# Patient Record
Sex: MALE | Race: WHITE | NOT HISPANIC OR LATINO
[De-identification: names, ages, dates, MRNs, and addresses within clinical notes are randomized per-mention and may not be internally consistent; named-entity substitution may affect disease eponyms.]

---

## 2017-01-23 ENCOUNTER — TRANSCRIPTION ENCOUNTER (OUTPATIENT)
Age: 47
End: 2017-01-23

## 2017-04-13 ENCOUNTER — APPOINTMENT (OUTPATIENT)
Dept: UROLOGY | Facility: CLINIC | Age: 47
End: 2017-04-13

## 2017-04-13 DIAGNOSIS — R31.29 OTHER MICROSCOPIC HEMATURIA: ICD-10-CM

## 2017-04-13 DIAGNOSIS — R30.0 DYSURIA: ICD-10-CM

## 2017-04-13 DIAGNOSIS — N20.1 CALCULUS OF URETER: ICD-10-CM

## 2017-04-14 LAB
APPEARANCE: CLEAR
BACTERIA: NEGATIVE
BILIRUBIN URINE: NEGATIVE
BLOOD URINE: NEGATIVE
COLOR: YELLOW
GLUCOSE QUALITATIVE U: NORMAL MG/DL
KETONES URINE: NEGATIVE
LEUKOCYTE ESTERASE URINE: NEGATIVE
MICROSCOPIC-UA: NORMAL
NITRITE URINE: NEGATIVE
PH URINE: 7.5
PROTEIN URINE: NEGATIVE MG/DL
RED BLOOD CELLS URINE: 2 /HPF
SPECIFIC GRAVITY URINE: 1.01
SQUAMOUS EPITHELIAL CELLS: 0 /HPF
UROBILINOGEN URINE: NORMAL MG/DL
WHITE BLOOD CELLS URINE: 2 /HPF

## 2017-09-28 ENCOUNTER — APPOINTMENT (OUTPATIENT)
Dept: UROLOGY | Facility: CLINIC | Age: 47
End: 2017-09-28

## 2017-11-13 ENCOUNTER — TRANSCRIPTION ENCOUNTER (OUTPATIENT)
Age: 47
End: 2017-11-13

## 2017-12-18 ENCOUNTER — TRANSCRIPTION ENCOUNTER (OUTPATIENT)
Age: 47
End: 2017-12-18

## 2018-01-23 ENCOUNTER — INPATIENT (INPATIENT)
Facility: HOSPITAL | Age: 48
LOS: 1 days | Discharge: HOME | DRG: 65 | End: 2018-01-25
Attending: PSYCHIATRY & NEUROLOGY | Admitting: PSYCHIATRY & NEUROLOGY
Payer: COMMERCIAL

## 2018-01-23 ENCOUNTER — EMERGENCY (EMERGENCY)
Facility: HOSPITAL | Age: 48
LOS: 0 days | Discharge: ANOTHER HOSPITAL | End: 2018-01-23
Attending: EMERGENCY MEDICINE | Admitting: EMERGENCY MEDICINE
Payer: COMMERCIAL

## 2018-01-23 VITALS
SYSTOLIC BLOOD PRESSURE: 139 MMHG | DIASTOLIC BLOOD PRESSURE: 85 MMHG | OXYGEN SATURATION: 99 % | TEMPERATURE: 98 F | RESPIRATION RATE: 18 BRPM | HEART RATE: 83 BPM

## 2018-01-23 VITALS
SYSTOLIC BLOOD PRESSURE: 119 MMHG | HEART RATE: 72 BPM | RESPIRATION RATE: 17 BRPM | DIASTOLIC BLOOD PRESSURE: 90 MMHG | OXYGEN SATURATION: 100 %

## 2018-01-23 VITALS
RESPIRATION RATE: 18 BRPM | OXYGEN SATURATION: 100 % | HEIGHT: 68 IN | DIASTOLIC BLOOD PRESSURE: 115 MMHG | HEART RATE: 80 BPM | SYSTOLIC BLOOD PRESSURE: 150 MMHG | TEMPERATURE: 100 F | WEIGHT: 210.1 LBS

## 2018-01-23 DIAGNOSIS — Z90.49 ACQUIRED ABSENCE OF OTHER SPECIFIED PARTS OF DIGESTIVE TRACT: Chronic | ICD-10-CM

## 2018-01-23 DIAGNOSIS — I63.9 CEREBRAL INFARCTION, UNSPECIFIED: ICD-10-CM

## 2018-01-23 DIAGNOSIS — Z90.89 ACQUIRED ABSENCE OF OTHER ORGANS: Chronic | ICD-10-CM

## 2018-01-23 DIAGNOSIS — R47.81 SLURRED SPEECH: ICD-10-CM

## 2018-01-23 DIAGNOSIS — F17.210 NICOTINE DEPENDENCE, CIGARETTES, UNCOMPLICATED: ICD-10-CM

## 2018-01-23 DIAGNOSIS — R29.702 NIHSS SCORE 2: ICD-10-CM

## 2018-01-23 LAB
ALBUMIN SERPL ELPH-MCNC: 4.1 G/DL — SIGNIFICANT CHANGE UP (ref 3.3–5)
ALP SERPL-CCNC: 83 U/L — SIGNIFICANT CHANGE UP (ref 40–120)
ALT FLD-CCNC: 39 U/L — SIGNIFICANT CHANGE UP (ref 12–78)
ANION GAP SERPL CALC-SCNC: 8 MMOL/L — SIGNIFICANT CHANGE UP (ref 5–17)
APPEARANCE UR: CLEAR — SIGNIFICANT CHANGE UP
APTT BLD: 33.3 SEC — SIGNIFICANT CHANGE UP (ref 27.5–37.4)
AST SERPL-CCNC: 34 U/L — SIGNIFICANT CHANGE UP (ref 15–37)
BASOPHILS # BLD AUTO: 0.1 K/UL — SIGNIFICANT CHANGE UP (ref 0–0.2)
BASOPHILS NFR BLD AUTO: 1.2 % — SIGNIFICANT CHANGE UP (ref 0–2)
BILIRUB SERPL-MCNC: 0.6 MG/DL — SIGNIFICANT CHANGE UP (ref 0.2–1.2)
BILIRUB UR-MCNC: NEGATIVE — SIGNIFICANT CHANGE UP
BLD GP AB SCN SERPL QL: NEGATIVE — SIGNIFICANT CHANGE UP
BUN SERPL-MCNC: 15 MG/DL — SIGNIFICANT CHANGE UP (ref 7–23)
CALCIUM SERPL-MCNC: 8.9 MG/DL — SIGNIFICANT CHANGE UP (ref 8.5–10.1)
CHLORIDE SERPL-SCNC: 107 MMOL/L — SIGNIFICANT CHANGE UP (ref 96–108)
CO2 SERPL-SCNC: 24 MMOL/L — SIGNIFICANT CHANGE UP (ref 22–31)
COLOR SPEC: YELLOW — SIGNIFICANT CHANGE UP
CREAT SERPL-MCNC: 0.82 MG/DL — SIGNIFICANT CHANGE UP (ref 0.5–1.3)
DIFF PNL FLD: NEGATIVE — SIGNIFICANT CHANGE UP
EOSINOPHIL # BLD AUTO: 0.2 K/UL — SIGNIFICANT CHANGE UP (ref 0–0.5)
EOSINOPHIL NFR BLD AUTO: 2.1 % — SIGNIFICANT CHANGE UP (ref 0–6)
GLUCOSE SERPL-MCNC: 81 MG/DL — SIGNIFICANT CHANGE UP (ref 70–99)
GLUCOSE UR QL: NEGATIVE MG/DL — SIGNIFICANT CHANGE UP
HCT VFR BLD CALC: 49.4 % — SIGNIFICANT CHANGE UP (ref 39–50)
HGB BLD-MCNC: 16.6 G/DL — SIGNIFICANT CHANGE UP (ref 13–17)
INR BLD: 1.11 RATIO — SIGNIFICANT CHANGE UP (ref 0.88–1.16)
KETONES UR-MCNC: NEGATIVE — SIGNIFICANT CHANGE UP
LEUKOCYTE ESTERASE UR-ACNC: NEGATIVE — SIGNIFICANT CHANGE UP
LYMPHOCYTES # BLD AUTO: 2.1 K/UL — SIGNIFICANT CHANGE UP (ref 1–3.3)
LYMPHOCYTES # BLD AUTO: 20.8 % — SIGNIFICANT CHANGE UP (ref 13–44)
MCHC RBC-ENTMCNC: 29.2 PG — SIGNIFICANT CHANGE UP (ref 27–34)
MCHC RBC-ENTMCNC: 33.7 GM/DL — SIGNIFICANT CHANGE UP (ref 32–36)
MCV RBC AUTO: 86.8 FL — SIGNIFICANT CHANGE UP (ref 80–100)
MONOCYTES # BLD AUTO: 0.9 K/UL — SIGNIFICANT CHANGE UP (ref 0–0.9)
MONOCYTES NFR BLD AUTO: 8.3 % — SIGNIFICANT CHANGE UP (ref 2–14)
NEUTROPHILS # BLD AUTO: 6.9 K/UL — SIGNIFICANT CHANGE UP (ref 1.8–7.4)
NEUTROPHILS NFR BLD AUTO: 67.6 % — SIGNIFICANT CHANGE UP (ref 43–77)
NITRITE UR-MCNC: NEGATIVE — SIGNIFICANT CHANGE UP
PH UR: 7 — SIGNIFICANT CHANGE UP (ref 5–8)
PLATELET # BLD AUTO: 246 K/UL — SIGNIFICANT CHANGE UP (ref 150–400)
POTASSIUM SERPL-MCNC: 3.7 MMOL/L — SIGNIFICANT CHANGE UP (ref 3.5–5.3)
POTASSIUM SERPL-SCNC: 3.7 MMOL/L — SIGNIFICANT CHANGE UP (ref 3.5–5.3)
PROT SERPL-MCNC: 7.6 GM/DL — SIGNIFICANT CHANGE UP (ref 6–8.3)
PROT UR-MCNC: NEGATIVE MG/DL — SIGNIFICANT CHANGE UP
PROTHROM AB SERPL-ACNC: 12 SEC — SIGNIFICANT CHANGE UP (ref 9.8–12.7)
RBC # BLD: 5.68 M/UL — SIGNIFICANT CHANGE UP (ref 4.2–5.8)
RBC # FLD: 11.9 % — SIGNIFICANT CHANGE UP (ref 10.3–14.5)
RH IG SCN BLD-IMP: POSITIVE — SIGNIFICANT CHANGE UP
SODIUM SERPL-SCNC: 139 MMOL/L — SIGNIFICANT CHANGE UP (ref 135–145)
SP GR SPEC: 1 — LOW (ref 1.01–1.02)
TROPONIN I SERPL-MCNC: <0.015 NG/ML — SIGNIFICANT CHANGE UP (ref 0.01–0.04)
UROBILINOGEN FLD QL: NEGATIVE MG/DL — SIGNIFICANT CHANGE UP
WBC # BLD: 10.3 K/UL — SIGNIFICANT CHANGE UP (ref 3.8–10.5)
WBC # FLD AUTO: 10.3 K/UL — SIGNIFICANT CHANGE UP (ref 3.8–10.5)

## 2018-01-23 PROCEDURE — 93010 ELECTROCARDIOGRAM REPORT: CPT

## 2018-01-23 PROCEDURE — 70496 CT ANGIOGRAPHY HEAD: CPT | Mod: 26

## 2018-01-23 PROCEDURE — 99285 EMERGENCY DEPT VISIT HI MDM: CPT

## 2018-01-23 PROCEDURE — 99291 CRITICAL CARE FIRST HOUR: CPT | Mod: 25

## 2018-01-23 PROCEDURE — 99291 CRITICAL CARE FIRST HOUR: CPT

## 2018-01-23 PROCEDURE — 70498 CT ANGIOGRAPHY NECK: CPT | Mod: 26

## 2018-01-23 PROCEDURE — 70450 CT HEAD/BRAIN W/O DYE: CPT | Mod: 26

## 2018-01-23 RX ORDER — ATORVASTATIN CALCIUM 80 MG/1
80 TABLET, FILM COATED ORAL AT BEDTIME
Qty: 0 | Refills: 0 | Status: DISCONTINUED | OUTPATIENT
Start: 2018-01-23 | End: 2018-01-25

## 2018-01-23 RX ORDER — CHOLECALCIFEROL (VITAMIN D3) 125 MCG
1 CAPSULE ORAL
Qty: 0 | Refills: 0 | COMMUNITY

## 2018-01-23 RX ORDER — ALTEPLASE 100 MG
81 KIT INTRAVENOUS ONCE
Qty: 0 | Refills: 0 | Status: COMPLETED | OUTPATIENT
Start: 2018-01-23 | End: 2018-01-23

## 2018-01-23 RX ORDER — ALTEPLASE 100 MG
9 KIT INTRAVENOUS ONCE
Qty: 0 | Refills: 0 | Status: COMPLETED | OUTPATIENT
Start: 2018-01-23 | End: 2018-01-23

## 2018-01-23 RX ORDER — CHOLECALCIFEROL (VITAMIN D3) 125 MCG
400 CAPSULE ORAL DAILY
Qty: 0 | Refills: 0 | Status: DISCONTINUED | OUTPATIENT
Start: 2018-01-23 | End: 2018-01-25

## 2018-01-23 RX ORDER — SODIUM CHLORIDE 9 MG/ML
1000 INJECTION INTRAMUSCULAR; INTRAVENOUS; SUBCUTANEOUS ONCE
Qty: 0 | Refills: 0 | Status: COMPLETED | OUTPATIENT
Start: 2018-01-23 | End: 2018-01-23

## 2018-01-23 RX ORDER — ASPIRIN/CALCIUM CARB/MAGNESIUM 324 MG
324 TABLET ORAL ONCE
Qty: 0 | Refills: 0 | Status: COMPLETED | OUTPATIENT
Start: 2018-01-23 | End: 2018-01-23

## 2018-01-23 RX ADMIN — ALTEPLASE 540 MILLIGRAM(S): KIT at 12:36

## 2018-01-23 RX ADMIN — ALTEPLASE 81 MILLIGRAM(S): KIT at 12:37

## 2018-01-23 RX ADMIN — Medication 324 MILLIGRAM(S): at 11:49

## 2018-01-23 RX ADMIN — SODIUM CHLORIDE 1000 MILLILITER(S): 9 INJECTION INTRAMUSCULAR; INTRAVENOUS; SUBCUTANEOUS at 10:50

## 2018-01-23 NOTE — ED PROVIDER NOTE - OBJECTIVE STATEMENT
47y male transfer from [facility] concern for stroke. Pt. reports waking up at 4am with difficulty using his left hand, went to sleep, woke up normal, then at work started to have slurred speech, left upper and lower extremity weakness and abnormal gait. Pt. presented to [facility] where symptoms had resolved and reportedly recurred while in CT scanner. tPA was given and patient was transferred. On arrival patient denies any symptoms, no cp, sob, n/v/d, weakness, changes in vision/hearing.

## 2018-01-23 NOTE — H&P ADULT - HISTORY OF PRESENT ILLNESS
Patient is a 47 year old left handed  male transferred from [facility]. Patient has PMH of HTN. He states that he woke up at 4am to blow his nose however was having difficulty reaching his face with his left hand. He went back to sleep and when he woke up this morning his symptoms have resolved. His last known normal was around 9:50am, and at 10am he noticed that he was having difficulty writing, had some slurred speech with drooling, and some gait difficulty. He was transferred over to [facility] and en route via ambulance, symptoms resolved. While in ED he had again some slurred speech and he was given tPA at 12:40pm. Patient transferred to [facility].

## 2018-01-23 NOTE — ED ADULT NURSE NOTE - OBJECTIVE STATEMENT
48 yo male sent to ED from  for CODE STROKE. A&Ox3. Pt reports that he woke up around 0400 AM with difficulty of fine movements of his left hand, and went back to sleep. Pt woke up normal and went to work. Around 10:00 AM, pt had left upper extremity weakness, slurred speech and unsteady gait. These symptoms then resolved, pt went to [place] ED, where his symptoms returned in CT scanner. CT scan show no evidence of bleeding and pt was given TPA 9 mg IV push and 81 mg/hour IV drip (according to TPA dosing protocol & pt weight of 99.2 kg). Pt had no neurological deficit en route to hospital as per EMS. Pt GCS of 15 here currently. Pt A&Ox4. No facial symmetry noted. No slurred speech. Equal strength to the bilateral upper and lower extremities. Neurologically intact. Pt has two IVs placed prior to arrival. Pt placed on cardiac monitor. Pt denies chest pain, SOB, N/V, abdominal pain, headache, fevers, and chills. Will continue to reassess.

## 2018-01-23 NOTE — ED PROVIDER NOTE - CRITICAL CARE PROVIDED
documentation/direct patient care (not related to procedure)/interpretation of diagnostic studies/additional history taking/consultation with other physicians

## 2018-01-23 NOTE — ED PROVIDER NOTE - NEUROLOGICAL, MLM
Alert and oriented, no focal deficits, no motor or sensory deficits. No pronator drift, nl finger-to-nose, nl strength/sensation bilaterally. Speech normal, CNII-XII intact

## 2018-01-23 NOTE — CONSULT NOTE ADULT - ASSESSMENT
47 year old man with stuttering left sided weakness, dysarthria, c/w right sided ischemia,? large vessel disease.  Spoke with Dr. Hoover, form [facility] stroke team, who recommended TPA and transfer to [place] for evaluation of possible intervention.

## 2018-01-23 NOTE — H&P ADULT - ATTENDING COMMENTS
I have seen and examined this patient with the stroke neurology team.     History was reviewed with the patient and/or available family members.   ROS: All negative except documented in the HPI.   Neurological exam was performed and agree with exam as documented above.   Laboratory results and imaging studies were reviewed by me.   I agree with the neurology resident note as documented above.    47 years old left-handed man with vascular risk factor of active smoking and hypertension is evaluated at [facility] for symptoms concerning for stroke. On 1/22 at around 4 AM, he awoke with left facial sensory paresthesia in the form of tingling/numbness and left upper extremity clumsiness. He did not seek any immediate medical attention but went back to sleep and upon waking up at 6 AM he reports complete resolution of his neurological symptoms. At around 10 AM at his work, he noticed acute onset of left arm weakness/clumsiness, dysarthria and left facial droop. He presented to OSH for further evaluation but reported to have noticed complete resolution of his focal neurological symptoms in the OSH ED by around 11:15 11:20 AM. Soon after that he was noted to have reappearance of his neurological symptoms and was subsequently treated with IV tPA. He was transferred to [facility] for further evaluation. Neurological examination today is nonfocal. CT brain on admission did not show any evidence of acute infarct or hemorrhage. CTA head and neck did not show any significant intracranial or extracranial large vessel severe stenosis or occlusion.    Impression:  Cerebral thrombosis with cerebral infarction. Right hemispheric dysfunction likely secondary to right MCA distribution stroke involving corona radiata versus right brainstem infarct - likely etiology being small vessel disease but possibility of embolism from a proximal source like cardiac/paradoxical source of embolism needs to be ruled out    Plan:   - Continue with  24 hours post IV tPA precautions including BP goal<180/105 mmHg for first 24 hours followed by gradual normotension as per protocol  - Not a candidate for neurosurgical intervention due to low NIHSS and CTA findings   - MRI brain without contrast   - Aspirin and pharmacological DVT prophylaxis to be considered at 24 hours after the IV tPA and after repeating brain imaging, SCDs for DVT prophylaxis in the interim  - Atorvastatin 80 mg at bedtime after establishing enteral access or passing swallow evaluation  - TTE with bubble study and telemetry to screen for possible cardiac source of embolism  - Prolonged cardiac monitoring with ICM to screen for occult cardiac arrythmia being the cardiac source of embolism, to be considered based on results of above mentioned tests   - HbA1C and LDL, continue with aggressive vascular risk factors modifications   - PT/OT/Speech and swallow evaluation     Above mentioned plan was discussed with patient in detail. All the questions were answered and concerns were addressed. More than 86 minutes were spent in evaluation and management of this critically ill and unstable patient with acute stroke.

## 2018-01-23 NOTE — CONSULT NOTE ADULT - PROBLEM SELECTOR RECOMMENDATION 9
TPA protocol.  transfer to stroke team [facility].  Plan discussed with wife and patient at the bedside who agree with above.

## 2018-01-23 NOTE — ED ADULT TRIAGE NOTE - CHIEF COMPLAINT QUOTE
Pt BIBA for stroke like symtpoms that started at approximately 1020 this morning. Pt had left sided facial droop, slurred speech and left sided numbness to the hand. BGM 04 in department. Pt had 18 G IV placed in right AC pta. Pt had similar symptoms at 4 am. Dr. [facility] evaluated pt in triage and stated it was not a CODE Stroke at this time.

## 2018-01-23 NOTE — ED ADULT NURSE NOTE - CHPI ED SYMPTOMS NEG
no blurred vision/no vomiting/no change in level of consciousness/no weakness/no confusion/no numbness/no loss of consciousness/no nausea/no dizziness/no fever

## 2018-01-23 NOTE — ED ADULT NURSE NOTE - OBJECTIVE STATEMENT
Pt states that at 0400 today he felt symptoms of left sided weakness, numbness/tingling in face and left arm - symptoms resolved and pt went back to sleep.  Pt felt normal upon going to work today but began to experience symptoms at approx 10am of left sided weakness to arm and leg and slurred speech, ambulance was called and pt brought to ER - while in ambulance symptoms resolved.  Pt with no symptoms upon arrival to ER.  During neuro assessment by resident MD pt began to experience symptoms again of slurred speech, left arm weakness and left sided facial droop - code stroke called at this time and pt brought immediately to CT scan.  During neuro exam by neuro PA symptoms again began to resolve.

## 2018-01-23 NOTE — ED PROVIDER NOTE - ATTENDING CONTRIBUTION TO CARE
Pt tx from [facility] for acute stroke s/p tpa with resolved L side weakness.  No complaints currently.  Appears well.

## 2018-01-23 NOTE — ED PROVIDER NOTE - OBJECTIVE STATEMENT
48 y/o M hx HTN, smoker, FHx CAD p/w numbness to L face, weakness to L hand and slurred speech. Pt reports he woke from sleep approx 6-8 hrs pta and felt hand numbness and had difficulty blowing his nose. His sx resolved and he attributed it to "carpal tunnel" issues he has had before from sleeping on arm. His sx returned about 1 hour ago while at work accompanied by facial numbness and slurred speech so EMS was called. He also reports he had unsteady gait. En route via EMS pt reports his sx completely resolved. No pain, no recent fever.   Initial Eval NIHSS = 0  Shortly after initial eval pts sx returned and had mild facial droop/dysarthria, repeat NIHSS = 2.   Code stroke called at 11:25  Case d/w neurology attg Dr Muro [id number]

## 2018-01-23 NOTE — H&P ADULT - ASSESSMENT
Patient is a 47 year old left handed  male transferred from [facility]. Patient has PMH of HTN. He states that he woke up at 4am to blow his nose however was having difficulty reaching his face with his left hand. He went back to sleep and when he woke up this morning his symptoms have resolved. His last known normal was around 9:50am, and at 10am he noticed that he was having difficulty writing, had some slurred speech with drooling, and some gait difficulty. He was transferred over to [facility] and en route via ambulance, symptoms resolved. While in ED he had again some slurred speech and he was given tPA at 12:40pm. Patient transferred to [facility].   Neuro exam nonfocal  CT head negative for acute pathology  NIHSS 0 preMRS 0  tPA given 12:40pm    Likely right hemispheric infarct    Recommend:  F/u official read on CTA head and neck, on my prelim review, no acute stenosis  Permissive HTN for 24 hours up to 185/105  Repeat CT head in 24 hours  If repeat CT head without hemorrhagic conversion, start on heparin/lovenox for DVT prophylaxis and ASA 81 mg QD  MRI brain without contrast  TTE with bubble study for possible valvular heart disease  Lipitor 80 mg PO QHS  HbA1c, LDL and continue with aggressive vascular risk factors modifications   Tele monitor  PT/OT/S&S eval  Neurochecks q2h

## 2018-01-23 NOTE — CONSULT NOTE ADULT - SUBJECTIVE AND OBJECTIVE BOX
Neurology Consult requested by:   Patient is a 47y old  Male who presents with a chief complaint of recurrent left facial droop, slurred speech, left arm weakness.   HPI: 47 year old man, smoker. Brought by ambulance for c/o slurred speech, left arm weakness, facial droop. Started around 4 Am, patient woke up, went back to bed, woke at 6: 30 felt fine. later this AM, around 10:30 AM agin noted symptoms at work, called EMS. On arrival to ER he was normal, again symptoms recurred, now resolved again. No recent illness, no injuries. Denies similar events int he past.      PAST MEDICAL & SURGICAL HISTORY:  Calculus of ureter  S/P tonsillectomy  Status post appendectomy  Status post cholecystectomy    FAMILY HISTORY:  No pertinent family history in first degree relatives    Social Hx:  Nonsmoker, no drug or alcohol use  Medications and Allergies Reviewed MEDICATIONS  (STANDING):     ROS: Pertinent positives in HPI, all other ROS were reviewed and are negative.      Examination:   Vital Signs Last 24 Hrs  T(C): 37.6 (23 Jan 2018 11:11), Max: 37.6 (23 Jan 2018 11:11)  T(F): 99.6 (23 Jan 2018 11:11), Max: 99.6 (23 Jan 2018 11:11)  HR: 80 (23 Jan 2018 11:11) (80 - 80)  BP: 150/115 (23 Jan 2018 11:11) (150/115 - 150/115)  BP(mean): --  RR: 18 (23 Jan 2018 11:11) (18 - 18)  SpO2: 100% (23 Jan 2018 11:11) (100% - 100%)  General: Cooperative, NAD   NECK: supple, no masses  ENT: Normal hearing   Vascular : no carotid bruits,   Lungs: CTAB  Chest: RRR, no murmurs  Extremities: nontender, no edema  Musculoskeletal: no adventitious movements, no joint stiffness  Skin: no rash    Neurological Examination:  NIHSS:3  MS: AOx3. Appropriately interactive, normal affect. Speech mildly dysarthric,  w/o paraphasic error, repetition, naming, reading is intact   CN: VFFTC, PERLL, EOMI, V1-3 sensation intact, face asymmetric, mild left facial NLF asymmetry  hearing intact, palate elevates symmetrically, tongue midline, SCM equal bilaterally  Motor: normal bulk and tone, no tremor, rigidity or bradykinesia.  5/5 all over. + left pronator drift  Sens: Intact to light touch.    Reflexes: 2/4 all over, downgoing toes b/l  Coord:  No dysmetria, DANI intact   Gait: Cannot test    Labs: Reviewed  Imaging:   < from: CT Brain Stroke Protocol (01.23.18 @ 11:31) >  FINDINGS:   No previous examinations are available for review.    The brain demonstrates no abnormal attenuation.   No acute cerebral   cortical infarct is seen. No intracranial hemorrhage is found.  No mass   effect is found in the brain.      The ventricles, sulci and basal cisterns appear unremarkable.         The orbits are unremarkable.  The paranasal sinuses are clear.  The nasal   cavity appears intact.  The nasopharynx is symmetric.  The central skull   base, petrous temporal bones and calvarium remain intact.      IMPRESSION:   Unremarkable head CT.    Critical value:  I discussed the

## 2018-01-23 NOTE — ED ADULT NURSE REASSESSMENT NOTE - NS ED NURSE REASSESS COMMENT FT1
Pt to CT. TPA infusion completed. Pt denies chest pain, abdominal pain, headache, n/v, fevers, and chills. No signs of active bleeding from any of the IV sites noted. Friend, Chiqui, at the bedside. Will continue to reassess.

## 2018-01-23 NOTE — ED ADULT NURSE REASSESSMENT NOTE - NS ED NURSE REASSESS COMMENT FT1
[place] Neuro team being consulted, Pt passed dysphagia screen, given baby ASA and NS bolus hung, Pt NSR on cardiac monitor.  Awaiting urine sample.  Will continue to monitor.

## 2018-01-23 NOTE — H&P ADULT - NSHPREVIEWOFSYSTEMS_GEN_ALL_CORE
Review of Systems:  CONSTITUTIONAL:  No weight loss, fever, chills, weakness or fatigue.  HEENT:  Eyes:  No visual loss, blurred vision, double vision or yellow sclerae. Ears, Nose, Throat:  No hearing loss, sneezing, congestion, runny nose or sore throat.  SKIN:  No rash or itching.  CARDIOVASCULAR:  No chest pain, chest pressure or chest discomfort. No palpitations or edema.  RESPIRATORY:  No shortness of breath, cough or sputum.  GASTROINTESTINAL:  No anorexia, nausea, vomiting or diarrhea. No abdominal pain or blood.  GENITOURINARY: No Burning on urination.   NEUROLOGICAL:  see HPI  MUSCULOSKELETAL:  No muscle, back pain, joint pain or stiffness.  HEMATOLOGIC:  No anemia, bleeding or bruising.  LYMPHATICS:  No enlarged nodes. No history of splenectomy.  PSYCHIATRIC:  No history of depression or anxiety.  ENDOCRINOLOGIC:  No reports of sweating, cold or heat intolerance. No polyuria or polydipsia.  ALLERGIES:  No history of asthma, hives, eczema or rhinitis.

## 2018-01-23 NOTE — ED PROVIDER NOTE - PROGRESS NOTE DETAILS
Dr. [facility]:  Pt eval. at EMS entrance: currently asympt. & no active focal neuro deficit.  + diff. speaking & LUE weakness at 10:20 Am, self-resolved in ambulance en route to ED.  This is 2nd episode in past 6 hours.  Pt does NOT meet criteria for Code Stroke/ t-PA at this time. Discussed case with Dr Hoover at [facility] neurology for possible transfer and neuro intervention.  Rec that since pt was witnessed normal in ED - initiate tpa in ED here at  and will accept pt for transfer to [facility]. EMS involved and are initiating transfer process

## 2018-01-23 NOTE — H&P ADULT - NSHPPHYSICALEXAM_GEN_ALL_CORE
Neurological Exam:  Mental Status: Orientated to self, date and place.  Attention intact.  No dysarthria, aphasia or neglect.  Knowledge intact.  Registration intact.  Short and long term memory grossly intact.      Cranial Nerves: PERRL, EOMI, VFF, no nystagmus or diplopia.  CN V1-3 intact to light touch and pinprick.  No facial asymmetry.  Hearing intact.  Tongue midline.  Sternocleidomastoid and Trapezius intact bilaterally.    Motor:   Tone: normal            Strength:     Upper extremity                      Delt       Bicep    Tricep                                               R         5/5 5/5 5/5 5/5                                            L          5/5 5/5 5/5 5/5  Lower extremity                       HF          KE          KF        DF         PF                                            R        5/5 5/5 5/5 5/5 5/5                                            L         5/5 5/5 5/5 5/5 5/5  Pronator drift: none                 Dysmetria: None to finger-nose-finger or heel-shin-heel  No truncal ataxia.    Tremor: No resting, postural or action tremor.  No myoclonus.    Sensation: intact to light touch, pinprick, vibration and proprioception    Deep Tendon Reflexes: 2+ bilateral biceps, triceps, brachioradialis, knee  Toes flexor bilaterally

## 2018-01-23 NOTE — ED ADULT NURSE REASSESSMENT NOTE - NS ED NURSE REASSESS COMMENT FT1
tpa started - IVP given by Dr. hernandez and infusion initiated by RN Ruy Cortes - [facility]  Pt left with ALS EMS without incident.

## 2018-01-23 NOTE — H&P ADULT - NSHPLABSRESULTS_GEN_ALL_CORE
16.6   10.3  )-----------( 246      ( 2018 11:39 )             49.4         139  |  107  |  15  ----------------------------<  81  3.7   |  24  |  0.82    Ca    8.9      2018 11:39    TPro  7.6  /  Alb  4.1  /  TBili  0.6  /  DBili  x   /  AST  34  /  ALT  39  /  AlkPhos  83      CAPILLARY BLOOD GLUCOSE      POCT Blood Glucose.: 83 mg/dL (2018 13:29)  POCT Blood Glucose.: 94 mg/dL (2018 11:13)    PT/INR - ( 2018 11:39 )   PT: 12.0 sec;   INR: 1.11 ratio         PTT - ( 2018 11:39 )  PTT:33.3 sec  Urinalysis Basic - ( 2018 11:40 )    Color: Yellow / Appearance: Clear / S.005 / pH: x  Gluc: x / Ketone: Negative  / Bili: Negative / Urobili: Negative mg/dL   Blood: x / Protein: Negative mg/dL / Nitrite: Negative   Leuk Esterase: Negative / RBC: x / WBC x   Sq Epi: x / Non Sq Epi: x / Bacteria: x    Vital Signs Last 24 Hrs  T(C): 36.7 (2018 13:33), Max: 37.6 (2018 11:11)  T(F): 98 (2018 13:33), Max: 99.6 (2018 11:11)  HR: 83 (2018 13:33) (72 - 83)  BP: 139/85 (2018 13:33) (111/87 - 150/115)  RR: 18 (2018 13:33) (17 - 18)  SpO2: 99% (2018 13:33) (99% - 100%)

## 2018-01-24 ENCOUNTER — TRANSCRIPTION ENCOUNTER (OUTPATIENT)
Age: 48
End: 2018-01-24

## 2018-01-24 LAB
ANION GAP SERPL CALC-SCNC: 14 MMOL/L — SIGNIFICANT CHANGE UP (ref 5–17)
BUN SERPL-MCNC: 13 MG/DL — SIGNIFICANT CHANGE UP (ref 7–23)
CALCIUM SERPL-MCNC: 9.2 MG/DL — SIGNIFICANT CHANGE UP (ref 8.4–10.5)
CHLORIDE SERPL-SCNC: 104 MMOL/L — SIGNIFICANT CHANGE UP (ref 96–108)
CHOLEST SERPL-MCNC: 183 MG/DL — SIGNIFICANT CHANGE UP (ref 10–199)
CO2 SERPL-SCNC: 22 MMOL/L — SIGNIFICANT CHANGE UP (ref 22–31)
CREAT SERPL-MCNC: 0.82 MG/DL — SIGNIFICANT CHANGE UP (ref 0.5–1.3)
GLUCOSE SERPL-MCNC: 80 MG/DL — SIGNIFICANT CHANGE UP (ref 70–99)
HBA1C BLD-MCNC: 5.2 % — SIGNIFICANT CHANGE UP (ref 4–5.6)
HCT VFR BLD CALC: 46.5 % — SIGNIFICANT CHANGE UP (ref 39–50)
HDLC SERPL-MCNC: 41 MG/DL — SIGNIFICANT CHANGE UP (ref 40–125)
HGB BLD-MCNC: 16.3 G/DL — SIGNIFICANT CHANGE UP (ref 13–17)
LIPID PNL WITH DIRECT LDL SERPL: 119 MG/DL — SIGNIFICANT CHANGE UP
MCHC RBC-ENTMCNC: 31.8 PG — SIGNIFICANT CHANGE UP (ref 27–34)
MCHC RBC-ENTMCNC: 35.1 GM/DL — SIGNIFICANT CHANGE UP (ref 32–36)
MCV RBC AUTO: 90.7 FL — SIGNIFICANT CHANGE UP (ref 80–100)
PLATELET # BLD AUTO: 239 K/UL — SIGNIFICANT CHANGE UP (ref 150–400)
POTASSIUM SERPL-MCNC: 3.7 MMOL/L — SIGNIFICANT CHANGE UP (ref 3.5–5.3)
POTASSIUM SERPL-SCNC: 3.7 MMOL/L — SIGNIFICANT CHANGE UP (ref 3.5–5.3)
RBC # BLD: 5.12 M/UL — SIGNIFICANT CHANGE UP (ref 4.2–5.8)
RBC # FLD: 12 % — SIGNIFICANT CHANGE UP (ref 10.3–14.5)
SODIUM SERPL-SCNC: 140 MMOL/L — SIGNIFICANT CHANGE UP (ref 135–145)
TOTAL CHOLESTEROL/HDL RATIO MEASUREMENT: 4.5 RATIO — SIGNIFICANT CHANGE UP (ref 3.4–9.6)
TRIGL SERPL-MCNC: 113 MG/DL — SIGNIFICANT CHANGE UP (ref 10–149)
WBC # BLD: 9.1 K/UL — SIGNIFICANT CHANGE UP (ref 3.8–10.5)
WBC # FLD AUTO: 9.1 K/UL — SIGNIFICANT CHANGE UP (ref 3.8–10.5)

## 2018-01-24 PROCEDURE — 93970 EXTREMITY STUDY: CPT | Mod: 26

## 2018-01-24 PROCEDURE — 70450 CT HEAD/BRAIN W/O DYE: CPT | Mod: 26

## 2018-01-24 RX ORDER — ENOXAPARIN SODIUM 100 MG/ML
40 INJECTION SUBCUTANEOUS DAILY
Qty: 0 | Refills: 0 | Status: DISCONTINUED | OUTPATIENT
Start: 2018-01-24 | End: 2018-01-25

## 2018-01-24 RX ORDER — ASPIRIN/CALCIUM CARB/MAGNESIUM 324 MG
81 TABLET ORAL DAILY
Qty: 0 | Refills: 0 | Status: DISCONTINUED | OUTPATIENT
Start: 2018-01-24 | End: 2018-01-25

## 2018-01-24 RX ADMIN — Medication 81 MILLIGRAM(S): at 18:20

## 2018-01-24 RX ADMIN — ATORVASTATIN CALCIUM 80 MILLIGRAM(S): 80 TABLET, FILM COATED ORAL at 22:21

## 2018-01-24 RX ADMIN — ENOXAPARIN SODIUM 40 MILLIGRAM(S): 100 INJECTION SUBCUTANEOUS at 18:20

## 2018-01-24 RX ADMIN — Medication 400 UNIT(S): at 11:25

## 2018-01-24 NOTE — DISCHARGE NOTE ADULT - PLAN OF CARE
prevention of further stroke Continue with daily aspirin and lipitor as prescribed.   you will need an outpatient MRI head w/wo contrast in an open machine.   Call [phone] to follow up authorization.  Follow up vascular neurology in 1 week.   Follow up with vascular n    Will need to follow up with cardiology for 30 day events monitor unless agree for LOOP monitor placement. If negative will need to consider placement of LOOP for long term monitoring.

## 2018-01-24 NOTE — PROGRESS NOTE ADULT - ASSESSMENT
ASSESSMENT: 47 years old left-handed man with vascular risk factor of active smoking and hypertension is evaluated at [facility] for symptoms concerning for stroke. On 1/22 at around 4 AM, he awoke with left facial sensory paresthesia in the form of tingling/numbness and left upper extremity clumsiness. He did not seek any immediate medical attention but went back to sleep and upon waking up at 6 AM he reports complete resolution of his neurological symptoms. At around 10 AM at his work, he noticed acute onset of left arm weakness/clumsiness, dysarthria and left facial droop. He presented to OSH for further evaluation but reported to have noticed complete resolution of his focal neurological symptoms in the OSH ED by around 11:15 11:20 AM. Soon after that he was noted to have reappearance of his neurological symptoms and was subsequently treated with IV tPA. He was transferred to [facility] for further evaluation.  CT brain on admission did not show any evidence of acute infarct or hemorrhage. CTA head and neck did not show any significant intracranial or extracranial large vessel severe stenosis or occlusion.    Impression:  Cerebral thrombosis with cerebral infarction. Right hemispheric dysfunction likely secondary to right MCA distribution stroke involving corona radiata versus right brainstem infarct - likely etiology being small vessel disease but possibility of embolism from a proximal source like cardiac/paradoxical source of embolism needs to be ruled out    NEURO: neurologically appears back to baseline, Continue close monitoring for neurologic deterioration, permissive HTN with slow titration to normotension, titrate statin to LDL goal less than 70, open MRI Brain as outpatient as patient refuses anesthesia and sedation given severe claustrophobia . Physical therapy/OT eval pending.    ANTITHROMBOTIC THERAPY: ASA permitting stable CT head after TPA protocol complete.    PULMONARY:  protecting airway, saturating well     CARDIOVASCULAR: check TTE, cardiac monitoring no events, recommend ILR , patient unsure - should he defer recommend at least 30 day holter as outpatient                               SBP goal: normotension     GASTROINTESTINAL:  dysphagia screen  passed tolerating diet      Diet: regular     RENAL: BUN/Cr without acute change, maintain adequate hydration, good urine output      Na Goal: Greater than 135     Hughes:n     HEMATOLOGY: H/H without acute change, no active bleeidng, Platelets 239     DVT ppx: venodynes, chemical dvt ppx once tap protocol complete permitting CTH stable     ID: afebrile, no leukocytosis     OTHER: current medical condition and plan of care dw patient in extent with verbalized/expressed full understanding, importance of close follow up enforced.    DISPOSITION: Rehab or home depending on PT eval once stable and workup is complete      CORE MEASURES:        Admission NIHSS: 0     TPA: [x] YES [] NO      LDL/HDL: 119/41     Depression Screen: p     Statin Therapy:y      Dysphagia Screen: [x] PASS [] FAIL     Smoking [] YES [x] NO      Afib [] YES [x] NO     Stroke Education [x] YES [] NO ASSESSMENT:   47 years old left-handed man with vascular risk factor of active smoking and hypertension is evaluated at [facility] for symptoms concerning for stroke. On 1/22 at around 4 AM, he awoke with left facial sensory paresthesia in the form of tingling/numbness and left upper extremity clumsiness. He did not seek any immediate medical attention but went back to sleep and upon waking up at 6 AM he reports complete resolution of his focal neurological symptoms. At around 10 AM at his work, he noticed acute onset of left arm weakness/clumsiness, dysarthria and left facial droop. He presented to OSH for further evaluation but reported to have noticed complete resolution of his focal neurological symptoms in the OSH ED by around 11:15 11:20 AM. Soon after that he was noted to have reappearance of his neurological symptoms and was subsequently treated with IV tPA. He was transferred to [facility] for further evaluation. CT brain on admission did not show any evidence of acute infarct or hemorrhage. CTA head and neck did not show any significant intracranial or extracranial large vessel severe stenosis or occlusion.    Impression:  Cerebral thrombosis with cerebral infarction. Probably right hemispheric dysfunction likely secondary to right MCA distribution (? averted) stroke involving corona radiata versus right brainstem infarct - likely etiology being small vessel disease but possibility of embolism from a proximal source like cardiac/paradoxical source of embolism needs to be ruled out    NEURO: Neurologically appears back to 	baseline, Continue close monitoring for neurologic deterioration, permissive HTN with slow titration to normotension, titrate statin to LDL goal less than 70, open MRI Brain as outpatient as patient refuses anesthesia and sedation given severe claustrophobia. Physical therapy/OT eval pending.    ANTITHROMBOTIC THERAPY: ASA permitting stable CT head after tPA protocol complete     PULMONARY: Protecting airway, saturating well     CARDIOVASCULAR: Check TTE with bubble study, cardiac monitoring no events, recommend prolonged cardiac monitoring with ICM to screen for occult cardiac arrythmia like atrial fibrillation, patient unsure - should he defer recommend at least 30 day events monitor as outpatient                               SBP goal: Gradual normotension     GASTROINTESTINAL: Dysphagia screen passed tolerating diet      Diet: Regular     RENAL: BUN/Cr without acute change, maintain adequate hydration, good urine output      Na Goal: Greater than 135     Hughes: N     HEMATOLOGY: H/H without acute change, no active bleeding, Platelets 239     DVT ppx: SCDs, pharmacological DVT prophylaxis once tPA protocol complete, permitting CTH stable     ID: Afebrile, no leukocytosis     OTHER: Current medical condition and plan of care discussed with patient in extent with verbalized/expressed full understanding, importance of close follow up enforced.    DISPOSITION: Rehab or home depending on PT eval once stable and workup is complete      CORE MEASURES:        Admission NIHSS: 0     TPA: [x] YES [] NO      LDL/HDL: 119/41     Depression Screen: p     Statin Therapy:y      Dysphagia Screen: [x] PASS [] FAIL     Smoking [] YES [x] NO      Afib [] YES [x] NO     Stroke Education [x] YES [] NO

## 2018-01-24 NOTE — DISCHARGE NOTE ADULT - MEDICATION SUMMARY - MEDICATIONS TO TAKE
I will START or STAY ON the medications listed below when I get home from the hospital:    aspirin 81 mg oral delayed release tablet  -- 1 tab(s) by mouth once a day  -- Indication: For Stroke    atorvastatin 80 mg oral tablet  -- 1 tab(s) by mouth once a day (at bedtime)  -- Indication: For Stroke    Norvasc 5 mg oral tablet  -- 1 tab(s) by mouth once a day  -- Indication: For HTN    Vitamin D3 400 intl units oral capsule  -- 1 cap(s) by mouth once a day  -- Indication: For vitamin supplement

## 2018-01-24 NOTE — DISCHARGE NOTE ADULT - CARE PROVIDER_API CALL
Joseph Hoover (MBBS), Neurology; Vascular Neurology  [address] [address]  Phone: [phone]  Fax: [phone] Joseph Hoover (LAKE), Neurology; Vascular Neurology  [address]  [place] [address]  Phone: [phone]  Fax: [phone]    Donald Aguilar (MD), Cardiovascular Disease; Internal Medicine  [address]  [place] [address]  Phone: [phone]  Fax: [phone]

## 2018-01-24 NOTE — DISCHARGE NOTE ADULT - CARE PROVIDERS DIRECT ADDRESSES
,[email or web address]_Unknown

## 2018-01-24 NOTE — DISCHARGE NOTE ADULT - CARE PLAN
Principal Discharge DX:	Cerebral ischemia  Goal:	prevention of further stroke  Assessment and plan of treatment:	Continue with daily aspirin and lipitor as prescribed.   you will need an outpatient MRI head w/wo contrast in an open machine.   Call [phone] to follow up authorization.  Follow up vascular neurology in 1 week.   Follow up with vascular n    Will need to follow up with cardiology for 30 day events monitor unless agree for LOOP monitor placement. If negative will need to consider placement of LOOP for long term monitoring.

## 2018-01-24 NOTE — PROGRESS NOTE ADULT - SUBJECTIVE AND OBJECTIVE BOX
THE PATIENT WAS SEEN AND EXAMINED BY ME WITH THE HOUSESTAFF AND STROKE TEAM DURING MORNING ROUNDS.   HPI:  Patient is a 47 year old left handed  male transferred from [facility]. Patient has PMH of HTN. He stated that he woke up at 4am day of admission to blow his nose however was having difficulty reaching his face with his left hand. He went back to sleep and when he woke up this morning his symptoms have resolved. His last known normal was around 9:50am, and at 10am he noticed that he was having difficulty writing, had some slurred speech with drooling, and some gait difficulty. He was transferred over to [facility] and en route via ambulance, symptoms resolved. While in ED he had again some slurred speech and he was given tPA at 12:40pm 1/23/18. Patient transferred to [facility].     SUBJECTIVE: No events overnight.  No new neurologic complaints.      atorvastatin 80 milliGRAM(s) Oral at bedtime  cholecalciferol 400 Unit(s) Oral daily      PHYSICAL EXAM:   Vital Signs Last 24 Hrs  T(C): 36.7 (24 Jan 2018 07:00), Max: 37.6 (23 Jan 2018 11:11)  T(F): 98.1 (24 Jan 2018 07:00), Max: 99.6 (23 Jan 2018 11:11)  HR: 55 (24 Jan 2018 07:00) (55 - 83)  BP: 97/59 (24 Jan 2018 07:00) (97/59 - 150/115)  BP(mean): 71 (24 Jan 2018 07:00) (59 - 98)  RR: 16 (24 Jan 2018 07:00) (12 - 22)  SpO2: 96% (24 Jan 2018 07:00) (95% - 100%)    General: No acute distress  HEENT: EOM intact, visual fields full  Abdomen: Soft, nontender, nondistended   Extremities: No edema    NEUROLOGICAL EXAM:  Mental status: Awake, alert, oriented x3, no aphasia, no neglect, normal memory   Cranial Nerves: No facial asymmetry, no nystagmus, no dysarthria,  tongue midline  Motor exam: Normal tone, no drift, 5/5 RUE, 5/5 RLE, 5/5 LUE, 5/5 LLE, normal fine finger movements.  Sensation: Intact to light touch   Coordination/ Gait: No dysmetria, DANI intact and symmetric bilaterally    LABS:                        16.3   9.1   )-----------( 239      ( 24 Jan 2018 01:14 )             46.5    01-24    140  |  104  |  13  ----------------------------<  80  3.7   |  22  |  0.82    Ca    9.2      24 Jan 2018 01:14    TPro  7.6  /  Alb  4.1  /  TBili  0.6  /  DBili  x   /  AST  34  /  ALT  39  /  AlkPhos  83  01-23  PT/INR - ( 23 Jan 2018 11:39 )   PT: 12.0 sec;   INR: 1.11 ratio         PTT - ( 23 Jan 2018 11:39 )  PTT:33.3 sec      IMAGING: Reviewed by me.   (01.23.18)   HEAD CT:  No evidence for acute territorial infarct, intracranial hemorrhage, mass   effect, or midline shift.    CTA HEAD:  No significant stenosis, occlusion, or aneurysm.    CTA NECK:  No significant stenosis or occlusion of the bilateral common/internal   carotid or vertebral arteries. THE PATIENT WAS SEEN AND EXAMINED BY ME WITH THE HOUSESTAFF AND STROKE TEAM DURING MORNING ROUNDS.     HPI:  Patient is a 47 year old left handed  male transferred from [facility]. Patient has PMH of HTN. He stated that he woke up at 4am day of admission to blow his nose however was having difficulty reaching his face with his left hand. He went back to sleep and when he woke up this morning his symptoms have resolved. His last known normal was around 9:50am, and at 10am he noticed that he was having difficulty writing, had some slurred speech with drooling, and some gait difficulty. He was transferred over to [facility] and en route via ambulance, symptoms resolved. While in ED he had again some slurred speech and he was given tPA at 12:40pm 1/23/18. Patient transferred to [facility].     SUBJECTIVE: No events overnight.  No new neurologic complaints.      atorvastatin 80 milliGRAM(s) Oral at bedtime  cholecalciferol 400 Unit(s) Oral daily    ROS: All negative except documented above.      PHYSICAL EXAM:   Vital Signs Last 24 Hrs  T(C): 36.7 (24 Jan 2018 07:00), Max: 37.6 (23 Jan 2018 11:11)  T(F): 98.1 (24 Jan 2018 07:00), Max: 99.6 (23 Jan 2018 11:11)  HR: 55 (24 Jan 2018 07:00) (55 - 83)  BP: 97/59 (24 Jan 2018 07:00) (97/59 - 150/115)  BP(mean): 71 (24 Jan 2018 07:00) (59 - 98)  RR: 16 (24 Jan 2018 07:00) (12 - 22)  SpO2: 96% (24 Jan 2018 07:00) (95% - 100%)    General: No acute distress  HEENT: EOM intact, visual fields full  Abdomen: Soft, nontender, nondistended   Extremities: No edema    NEUROLOGICAL EXAM:  Mental status: Awake, alert, oriented x3, no aphasia, no neglect, normal memory   Cranial Nerves: No facial asymmetry, no nystagmus, no dysarthria,  tongue midline  Motor exam: Normal tone, no drift, 5/5 RUE, 5/5 RLE, 5/5 LUE, 5/5 LLE, normal fine finger movements.  Sensation: Intact to light touch   Coordination/ Gait: No dysmetria, DANI intact and symmetric bilaterally    LABS:                        16.3   9.1   )-----------( 239      ( 24 Jan 2018 01:14 )             46.5    01-24    140  |  104  |  13  ----------------------------<  80  3.7   |  22  |  0.82    Ca    9.2      24 Jan 2018 01:14    TPro  7.6  /  Alb  4.1  /  TBili  0.6  /  DBili  x   /  AST  34  /  ALT  39  /  AlkPhos  83  01-23  PT/INR - ( 23 Jan 2018 11:39 )   PT: 12.0 sec;   INR: 1.11 ratio         PTT - ( 23 Jan 2018 11:39 )  PTT:33.3 sec      IMAGING: Reviewed by me.   (01.23.18)   HEAD CT:  No evidence for acute territorial infarct, intracranial hemorrhage, mass   effect, or midline shift.    CTA HEAD:  No significant stenosis, occlusion, or aneurysm.    CTA NECK:  No significant stenosis or occlusion of the bilateral common/internal   carotid or vertebral arteries.

## 2018-01-24 NOTE — DISCHARGE NOTE ADULT - PATIENT PORTAL LINK FT
“You can access the Follow[employer] Patient Portal, offered by [facility], by registering with the following website: [email or web address]”

## 2018-01-24 NOTE — DISCHARGE NOTE ADULT - HOSPITAL COURSE
Patient is a 47 year old left handed  male transferred from [facility]. Patient has PMH of HTN. He states that he woke up at 4am to blow his nose however was having difficulty reaching his face with his left hand. He went back to sleep and when he woke up this morning his symptoms have resolved. His last known normal was around 9:50am, and at 10am he noticed that he was having difficulty writing, had some slurred speech with drooling, and some gait difficulty. He was transferred over to [facility] and en route via ambulance, symptoms resolved. While in ED he had again some slurred speech and he was given tPA at 12:40pm. Patient transferred to [facility].     Neurological examination was nonfocal. CT brain on admission did not show any evidence of acute infarct or hemorrhage. CTA head and neck did not show any significant intracranial or extracranial large vessel severe stenosis or occlusion.  Pt unable to have MRI inpatient due to severe claustrophobia and requested to have study done in  an open MRI as outpatient. TTE demonstrated no abnormalities. Repeat CTH was negative for acute infarct.   Impression was probable stroke due to unknown etiology. Pt discharged home, no PT needs. Will have MRI done as outpatient.

## 2018-01-25 VITALS — DIASTOLIC BLOOD PRESSURE: 84 MMHG | HEART RATE: 73 BPM | OXYGEN SATURATION: 99 % | SYSTOLIC BLOOD PRESSURE: 126 MMHG

## 2018-01-25 PROCEDURE — 70450 CT HEAD/BRAIN W/O DYE: CPT

## 2018-01-25 PROCEDURE — 86901 BLOOD TYPING SEROLOGIC RH(D): CPT

## 2018-01-25 PROCEDURE — 82962 GLUCOSE BLOOD TEST: CPT

## 2018-01-25 PROCEDURE — 85027 COMPLETE CBC AUTOMATED: CPT

## 2018-01-25 PROCEDURE — 93306 TTE W/DOPPLER COMPLETE: CPT | Mod: 26

## 2018-01-25 PROCEDURE — 80061 LIPID PANEL: CPT

## 2018-01-25 PROCEDURE — 86850 RBC ANTIBODY SCREEN: CPT

## 2018-01-25 PROCEDURE — 97165 OT EVAL LOW COMPLEX 30 MIN: CPT

## 2018-01-25 PROCEDURE — 70496 CT ANGIOGRAPHY HEAD: CPT

## 2018-01-25 PROCEDURE — 93005 ELECTROCARDIOGRAM TRACING: CPT

## 2018-01-25 PROCEDURE — 70450 CT HEAD/BRAIN W/O DYE: CPT | Mod: 26

## 2018-01-25 PROCEDURE — 97161 PT EVAL LOW COMPLEX 20 MIN: CPT

## 2018-01-25 PROCEDURE — 93306 TTE W/DOPPLER COMPLETE: CPT

## 2018-01-25 PROCEDURE — 99285 EMERGENCY DEPT VISIT HI MDM: CPT | Mod: 25

## 2018-01-25 PROCEDURE — 80048 BASIC METABOLIC PNL TOTAL CA: CPT

## 2018-01-25 PROCEDURE — 86900 BLOOD TYPING SEROLOGIC ABO: CPT

## 2018-01-25 PROCEDURE — 70498 CT ANGIOGRAPHY NECK: CPT

## 2018-01-25 PROCEDURE — 83036 HEMOGLOBIN GLYCOSYLATED A1C: CPT

## 2018-01-25 PROCEDURE — 93970 EXTREMITY STUDY: CPT

## 2018-01-25 RX ORDER — ASPIRIN/CALCIUM CARB/MAGNESIUM 324 MG
1 TABLET ORAL
Qty: 0 | Refills: 0 | DISCHARGE
Start: 2018-01-25

## 2018-01-25 RX ORDER — ATORVASTATIN CALCIUM 80 MG/1
1 TABLET, FILM COATED ORAL
Qty: 30 | Refills: 1 | OUTPATIENT
Start: 2018-01-25 | End: 2018-03-25

## 2018-01-25 RX ADMIN — Medication 81 MILLIGRAM(S): at 13:02

## 2018-01-25 RX ADMIN — ENOXAPARIN SODIUM 40 MILLIGRAM(S): 100 INJECTION SUBCUTANEOUS at 13:02

## 2018-01-25 NOTE — PROGRESS NOTE ADULT - ASSESSMENT
ASSESSMENT:   47 years old left-handed man with vascular risk factor of active smoking and hypertension is evaluated at [facility] for symptoms concerning for stroke. On 1/22 at around 4 AM, he awoke with left facial sensory paresthesia in the form of tingling/numbness and left upper extremity clumsiness. He did not seek any immediate medical attention but went back to sleep and upon waking up at 6 AM he reports complete resolution of his focal neurological symptoms. At around 10 AM at his work, he noticed acute onset of left arm weakness/clumsiness, dysarthria and left facial droop. He presented to OSH for further evaluation but reported to have noticed complete resolution of his focal neurological symptoms in the OSH ED by around 11:15 11:20 AM. Soon after that he was noted to have reappearance of his neurological symptoms and was subsequently treated with IV tPA. He was transferred to [facility] for further evaluation. CT brain on admission did not show any evidence of acute infarct or hemorrhage. CTA head and neck did not show any significant intracranial or extracranial large vessel severe stenosis or occlusion.    Impression:  Cerebral thrombosis with cerebral infarction. Probably right hemispheric dysfunction likely secondary to right MCA distribution (? averted) stroke involving corona radiata versus right brainstem infarct - likely etiology being small vessel disease but possibility of embolism from a proximal source like cardiac/paradoxical source of embolism needs to be ruled out    NEURO: Neurologically appears back to 	baseline, permissive HTN with slow titration to normotension, titrate statin to LDL goal less than 70, open MRI Brain as outpatient as patient refuses anesthesia and sedation given severe claustrophobia. Physical therapy/OT eval  for home no needs.    ANTITHROMBOTIC THERAPY: ASA      PULMONARY: Protecting airway, saturating well     CARDIOVASCULAR:TTE:  ef 51%, cardiac monitoring no events, recommend prolonged cardiac monitoring with ICM to screen for occult cardiac arrythmia like atrial fibrillation, patient unsure - should he defer recommend at least 30 day events monitor as outpatient, he will decide after MRI                               SBP goal: Gradual normotension     GASTROINTESTINAL: Dysphagia screen passed tolerating diet      Diet: Regular     RENAL:  maintain adequate hydration, good urine output      Na Goal: Greater than 135     Hughes: N     HEMATOLOGY:  no active bleeding      DVT ppx: lovenox     ID: Afebrile, no sis/x of infection     OTHER: Current medical condition and plan of care discussed with patient in extent with verbalized/expressed full understanding, importance of close follow up enforced. MRI ordered- PA request sent.     DISPOSITION: Home.       CORE MEASURES:        Admission NIHSS: 0     TPA: [x] YES [] NO      LDL/HDL: 119/41     Depression Screen: 0     Statin Therapy:y      Dysphagia Screen: [x] PASS [] FAIL     Smoking [] YES [x] NO      Afib [] YES [x] NO     Stroke Education [x] YES [] NO ASSESSMENT:   47 years old left-handed man with vascular risk factor of active smoking and hypertension is evaluated at [facility] for symptoms concerning for stroke. On 1/22 at around 4 AM, he awoke with left facial sensory paresthesia in the form of tingling/numbness and left upper extremity clumsiness. He did not seek any immediate medical attention but went back to sleep and upon waking up at 6 AM he reports complete resolution of his focal neurological symptoms. At around 10 AM at his work, he noticed acute onset of left arm weakness/clumsiness, dysarthria and left facial droop. He presented to OSH for further evaluation but reported to have noticed complete resolution of his focal neurological symptoms in the OSH ED by around 11:15 11:20 AM. Soon after that he was noted to have reappearance of his neurological symptoms and was subsequently treated with IV tPA. He was transferred to [facility] for further evaluation. CT brain on admission did not show any evidence of acute infarct or hemorrhage. CTA head and neck did not show any significant intracranial or extracranial large vessel severe stenosis or occlusion. Repeat CT brain on 1/25 did not show any acute infarct.     Impression:  Cerebral thrombosis with cerebral infarction. Probably right hemispheric dysfunction likely secondary to right MCA distribution (? averted) stroke involving corona radiata versus right brainstem infarct - likely etiology being small vessel disease but possibility of embolism from a proximal source like cardiac/paradoxical source of embolism needs to be ruled out    NEURO: Neurologically appears back to 	baseline, permissive HTN with slow titration to normotension, titrate statin to LDL goal less than 70, open MRI Brain as outpatient as patient refuses anesthesia and sedation given severe claustrophobia. Physical therapy/OT eval  for home no needs.    ANTITHROMBOTIC THERAPY: ASA for secondary stroke prevention     PULMONARY: Protecting airway, saturating well     CARDIOVASCULAR:TTE:  LVEF 51%, cardiac monitoring no events, recommend prolonged cardiac monitoring with ICM to screen for occult cardiac arrythmia like atrial fibrillation, he refused to undergo ICM placement for prolonged cardiac monitoring and wished to proceed with 30 day events monitor as outpatient. He also reports to decide about possible ICM placement after MRI brain     SBP goal: Gradual normotension     GASTROINTESTINAL: Dysphagia screen passed tolerating diet      Diet: Regular     RENAL: Maintain adequate hydration, good urine output      Na Goal: Greater than 135     Hughes: N     HEMATOLOGY:  No active bleeding      DVT ppx: lovenox     ID: Afebrile, no si/sx of infection     OTHER: Current medical condition and plan of care discussed with patient in extent with verbalized/expressed full understanding, importance of close follow up enforced. MRI ordered- PA request sent.     DISPOSITION: Home.     CORE MEASURES:        Admission NIHSS: 0     TPA: [x] YES [] NO      LDL/HDL: 119/41     Depression Screen: 0     Statin Therapy:y      Dysphagia Screen: [x] PASS [] FAIL     Smoking [] YES [x] NO      Afib [] YES [x] NO     Stroke Education [x] YES [] NO

## 2018-01-25 NOTE — PHYSICAL THERAPY INITIAL EVALUATION ADULT - PRECAUTIONS/LIMITATIONS, REHAB EVAL
continued from above.HEAD CT:No evidence for acute territorial infarct, intracranial hemorrhage, mass effect, or midline shift.CTA HEAD: No significant stenosis, occlusion, or aneurysm. CTA NECK: No significant stenosis or occlusion of the bilateral common/internal carotid or vertebral arteries. fall precautions/continued from above.HEAD CT:No evidence for acute territorial infarct, intracranial hemorrhage, mass effect, or midline shift.CTA HEAD: No significant stenosis, occlusion, or aneurysm. CTA NECK: No significant stenosis or occlusion of the bilateral common/internal carotid or vertebral arteries.

## 2018-01-25 NOTE — PHYSICAL THERAPY INITIAL EVALUATION ADULT - ADDITIONAL COMMENTS
pt reports living in home with 20 steps to 2nd floor and 1 step to enter. pt reports exercising regularly. pt reports lives in home with 20 steps to 2nd floor and 1 step to enter. pt lives with spouse and children. pt reports exercising regularly.

## 2018-01-25 NOTE — OCCUPATIONAL THERAPY INITIAL EVALUATION ADULT - PERTINENT HX OF CURRENT PROBLEM, REHAB EVAL
47 yr old M w/ PMH of HTN. Woke up w/ difficulty reaching his face with his left hand. Woke up w/ symptoms resolved. Woke up again w/ difficulty writing, had some slurred speech with drooling, and some gait difficulty. He was [facility] over to [facility] system and en route via ambulance, symptoms resolved. Given tPA at 12:40pm.

## 2018-01-25 NOTE — PHYSICAL THERAPY INITIAL EVALUATION ADULT - PERTINENT HX OF CURRENT PROBLEM, REHAB EVAL
48 y/o L handed M transferred from [facility]. Patient has PMH of HTN. States he woke up at 4am & havd difficulty reaching face with L hand. He went back to sleep and when he woke up in morning, symptoms resolved. Last known normal was ~9:50am, at he noticed difficulty writing, had slurred speech, drooling, & gait difficulty. Transferred to [facility] and en route via ambulance, symptoms resolved. While in ED he had slurred speech, tPA at 12:40pm. Patient transferred to [facility]

## 2018-01-25 NOTE — PROGRESS NOTE ADULT - SUBJECTIVE AND OBJECTIVE BOX
THE PATIENT WAS SEEN AND EXAMINED BY ME WITH THE HOUSESTAFF AND STROKE TEAM DURING MORNING ROUNDS.   HPI:  Patient is a 47 year old left handed  male transferred from [facility]. Patient has PMH of HTN. He stated that he woke up at 4am day of admission to blow his nose however was having difficulty reaching his face with his left hand. He went back to sleep and when he woke up this morning his symptoms have resolved. His last known normal was around 9:50am, and at 10am he noticed that he was having difficulty writing, had some slurred speech with drooling, and some gait difficulty. He was transferred over to [facility] and en route via ambulance, symptoms resolved. While in ED he had again some slurred speech and he was given tPA at 12:40pm 1/23/18. Patient transferred to [facility].       SUBJECTIVE: No events overnight.  No new neurologic complaints.      aspirin enteric coated 81 milliGRAM(s) Oral daily  atorvastatin 80 milliGRAM(s) Oral at bedtime  cholecalciferol 400 Unit(s) Oral daily  enoxaparin Injectable 40 milliGRAM(s) SubCutaneous daily      PHYSICAL EXAM:   Vital Signs Last 24 Hrs  T(C): 36.6 (25 Jan 2018 07:35), Max: 36.8 (24 Jan 2018 18:00)  T(F): 97.8 (25 Jan 2018 07:35), Max: 98.2 (24 Jan 2018 18:00)  HR: 73 (25 Jan 2018 10:08) (60 - 75)  BP: 126/84 (25 Jan 2018 10:08) (107/45 - 138/94)  BP(mean): 97 (24 Jan 2018 18:00) (95 - 109)  RR: 18 (25 Jan 2018 07:35) (13 - 22)  SpO2: 99% (25 Jan 2018 10:08) (93% - 100%)    General: No acute distress  HEENT: EOM intact, visual fields full  Abdomen: Soft, nontender, nondistended   Extremities: No edema    NEUROLOGICAL EXAM:  Mental status: Awake, alert, oriented x3, no aphasia, no neglect, normal memory   Cranial Nerves: No facial asymmetry, no nystagmus, no dysarthria,  tongue midline  Motor exam: Normal tone, no drift, 5/5 RUE, 5/5 RLE, 5/5 LUE, 5/5 LLE, normal fine finger movements.  Sensation: Intact to light touch   Coordination/ Gait: No dysmetria, DANI intact and symmetric bilaterally    LABS:                        16.3   9.1   )-----------( 239      ( 24 Jan 2018 01:14 )             46.5    01-24    140  |  104  |  13  ----------------------------<  80  3.7   |  22  |  0.82    Ca    9.2      24 Jan 2018 01:14      Hemoglobin A1C, Whole Blood: 5.2 % (01-24 @ 07:55)      IMAGING: Reviewed by me.     (01.23.18)   HEAD CT:  No evidence for acute territorial infarct, intracranial hemorrhage, mass   effect, or midline shift.    CTA HEAD:  No significant stenosis, occlusion, or aneurysm.    CTA NECK:  No significant stenosis or occlusion of the bilateral common/internal   carotid or vertebral arteries.    CT Head No Cont (01.25.18 )   No acute intracranial hemorrhage, or evidence for acute transcortical   territorialinfarction. THE PATIENT WAS SEEN AND EXAMINED BY ME WITH THE HOUSESTAFF AND STROKE TEAM DURING MORNING ROUNDS.     HPI:  Patient is a 47 year old left handed  male transferred from [facility]. Patient has PMH of HTN. He stated that he woke up at 4am day of admission to blow his nose however was having difficulty reaching his face with his left hand. He went back to sleep and when he woke up this morning his symptoms have resolved. His last known normal was around 9:50am, and at 10am he noticed that he was having difficulty writing, had some slurred speech with drooling, and some gait difficulty. He was transferred over to [facility] and en route via ambulance, symptoms resolved. While in ED he had again some slurred speech and he was given tPA at 12:40pm 1/23/18. Patient transferred to [facility].     SUBJECTIVE: No events overnight.  No new neurologic complaints.      aspirin enteric coated 81 milliGRAM(s) Oral daily  atorvastatin 80 milliGRAM(s) Oral at bedtime  cholecalciferol 400 Unit(s) Oral daily  enoxaparin Injectable 40 milliGRAM(s) SubCutaneous daily    ROS: All negative except documented above.    PHYSICAL EXAM:   Vital Signs Last 24 Hrs  T(C): 36.6 (25 Jan 2018 07:35), Max: 36.8 (24 Jan 2018 18:00)  T(F): 97.8 (25 Jan 2018 07:35), Max: 98.2 (24 Jan 2018 18:00)  HR: 73 (25 Jan 2018 10:08) (60 - 75)  BP: 126/84 (25 Jan 2018 10:08) (107/45 - 138/94)  BP(mean): 97 (24 Jan 2018 18:00) (95 - 109)  RR: 18 (25 Jan 2018 07:35) (13 - 22)  SpO2: 99% (25 Jan 2018 10:08) (93% - 100%)    General: No acute distress  HEENT: EOM intact, visual fields full  Abdomen: Soft, nontender, nondistended   Extremities: No edema    NEUROLOGICAL EXAM:  Mental status: Awake, alert, oriented x3, no aphasia, no neglect, normal memory   Cranial Nerves: No facial asymmetry, no nystagmus, no dysarthria,  tongue midline  Motor exam: Normal tone, no drift, 5/5 RUE, 5/5 RLE, 5/5 LUE, 5/5 LLE, normal fine finger movements.  Sensation: Intact to light touch   Coordination/ Gait: No dysmetria, DANI intact and symmetric bilaterally    LABS:                        16.3   9.1   )-----------( 239      ( 24 Jan 2018 01:14 )             46.5    01-24    140  |  104  |  13  ----------------------------<  80  3.7   |  22  |  0.82    Ca    9.2      24 Jan 2018 01:14      Hemoglobin A1C, Whole Blood: 5.2 % (01-24 @ 07:55)      IMAGING: Reviewed by me.     (01.23.18)   HEAD CT:  No evidence for acute territorial infarct, intracranial hemorrhage, mass   effect, or midline shift.    CTA HEAD:  No significant stenosis, occlusion, or aneurysm.    CTA NECK:  No significant stenosis or occlusion of the bilateral common/internal   carotid or vertebral arteries.    CT Head No Cont (01.25.18 )   No acute intracranial hemorrhage, or evidence for acute transcortical   territorialinfarction.

## 2018-01-25 NOTE — OCCUPATIONAL THERAPY INITIAL EVALUATION ADULT - LIVES WITH, PROFILE
spouse/children/Patient lives in a private [place] home w/ 1 step to main entry and additional 20 steps to front door.

## 2018-01-25 NOTE — PHYSICAL THERAPY INITIAL EVALUATION ADULT - GENERAL OBSERVATIONS, REHAB EVAL
Pt received sitting EOB with BRITTNEY Montaño and SRIKANTH Fuentes Pt received sitting EOB with OT Sabra and SOT Gina in NAD, pt reports feeling at basline

## 2018-01-25 NOTE — OCCUPATIONAL THERAPY INITIAL EVALUATION ADULT - ADDITIONAL COMMENTS
1/23 HEAD CT: No evidence for acute territorial infarct, intracranial hemorrhage, mass effect, or midline shift. CTA HEAD: No significant stenosis, occlusion, or aneurysm. CTA NECK: No significant stenosis or occlusion of the bilateral common/internal carotid or vertebral arteries.

## 2018-01-29 ENCOUNTER — APPOINTMENT (OUTPATIENT)
Dept: NEUROLOGY | Facility: CLINIC | Age: 48
End: 2018-01-29
Payer: COMMERCIAL

## 2018-01-29 VITALS
WEIGHT: 210 LBS | HEART RATE: 65 BPM | BODY MASS INDEX: 30.06 KG/M2 | HEIGHT: 70 IN | DIASTOLIC BLOOD PRESSURE: 73 MMHG | SYSTOLIC BLOOD PRESSURE: 118 MMHG

## 2018-01-29 DIAGNOSIS — Z83.3 FAMILY HISTORY OF DIABETES MELLITUS: ICD-10-CM

## 2018-01-29 DIAGNOSIS — Z78.9 OTHER SPECIFIED HEALTH STATUS: ICD-10-CM

## 2018-01-29 DIAGNOSIS — I10 ESSENTIAL (PRIMARY) HYPERTENSION: ICD-10-CM

## 2018-01-29 PROCEDURE — 99215 OFFICE O/P EST HI 40 MIN: CPT

## 2018-02-09 ENCOUNTER — OUTPATIENT (OUTPATIENT)
Dept: OUTPATIENT SERVICES | Facility: HOSPITAL | Age: 48
LOS: 1 days | Discharge: HOME | End: 2018-02-09

## 2018-02-09 DIAGNOSIS — Z90.49 ACQUIRED ABSENCE OF OTHER SPECIFIED PARTS OF DIGESTIVE TRACT: Chronic | ICD-10-CM

## 2018-02-09 DIAGNOSIS — Z90.89 ACQUIRED ABSENCE OF OTHER ORGANS: Chronic | ICD-10-CM

## 2018-02-09 DIAGNOSIS — D68.9 COAGULATION DEFECT, UNSPECIFIED: ICD-10-CM

## 2018-02-15 ENCOUNTER — APPOINTMENT (OUTPATIENT)
Dept: HEMATOLOGY ONCOLOGY | Facility: CLINIC | Age: 48
End: 2018-02-15
Payer: COMMERCIAL

## 2018-02-15 VITALS
TEMPERATURE: 98.7 F | DIASTOLIC BLOOD PRESSURE: 89 MMHG | BODY MASS INDEX: 31.26 KG/M2 | SYSTOLIC BLOOD PRESSURE: 140 MMHG | OXYGEN SATURATION: 97 % | HEART RATE: 79 BPM | HEIGHT: 70 IN | WEIGHT: 218.38 LBS

## 2018-02-15 DIAGNOSIS — D68.52 PROTHROMBIN GENE MUTATION: ICD-10-CM

## 2018-02-15 PROCEDURE — 99205 OFFICE O/P NEW HI 60 MIN: CPT

## 2018-02-15 RX ORDER — ATORVASTATIN CALCIUM 80 MG/1
TABLET, FILM COATED ORAL DAILY
Refills: 0 | Status: DISCONTINUED | COMMUNITY
End: 2018-02-15

## 2018-02-16 ENCOUNTER — TRANSCRIPTION ENCOUNTER (OUTPATIENT)
Age: 48
End: 2018-02-16

## 2018-02-20 DIAGNOSIS — I63.311 CEREBRAL INFARCTION DUE TO THROMBOSIS OF RIGHT MIDDLE CEREBRAL ARTERY: ICD-10-CM

## 2018-02-20 DIAGNOSIS — D68.52 PROTHROMBIN GENE MUTATION: ICD-10-CM

## 2018-02-23 ENCOUNTER — OUTPATIENT (OUTPATIENT)
Dept: OUTPATIENT SERVICES | Facility: HOSPITAL | Age: 48
LOS: 1 days | End: 2018-02-23
Payer: COMMERCIAL

## 2018-02-23 ENCOUNTER — APPOINTMENT (OUTPATIENT)
Dept: CV DIAGNOSITCS | Facility: HOSPITAL | Age: 48
End: 2018-02-23

## 2018-02-23 DIAGNOSIS — Z90.89 ACQUIRED ABSENCE OF OTHER ORGANS: Chronic | ICD-10-CM

## 2018-02-23 DIAGNOSIS — I25.10 ATHEROSCLEROTIC HEART DISEASE OF NATIVE CORONARY ARTERY WITHOUT ANGINA PECTORIS: ICD-10-CM

## 2018-02-23 DIAGNOSIS — Z90.49 ACQUIRED ABSENCE OF OTHER SPECIFIED PARTS OF DIGESTIVE TRACT: Chronic | ICD-10-CM

## 2018-02-23 PROCEDURE — 93312 ECHO TRANSESOPHAGEAL: CPT | Mod: 26

## 2018-02-23 PROCEDURE — 93325 DOPPLER ECHO COLOR FLOW MAPG: CPT | Mod: 26

## 2018-02-23 PROCEDURE — 93320 DOPPLER ECHO COMPLETE: CPT

## 2018-02-23 PROCEDURE — 93325 DOPPLER ECHO COLOR FLOW MAPG: CPT

## 2018-02-23 PROCEDURE — 93312 ECHO TRANSESOPHAGEAL: CPT

## 2018-02-23 PROCEDURE — 93320 DOPPLER ECHO COMPLETE: CPT | Mod: 26

## 2018-03-01 ENCOUNTER — APPOINTMENT (OUTPATIENT)
Dept: NEUROLOGY | Facility: CLINIC | Age: 48
End: 2018-03-01
Payer: COMMERCIAL

## 2018-03-01 VITALS
BODY MASS INDEX: 31.21 KG/M2 | WEIGHT: 218 LBS | HEIGHT: 70 IN | DIASTOLIC BLOOD PRESSURE: 82 MMHG | HEART RATE: 58 BPM | SYSTOLIC BLOOD PRESSURE: 127 MMHG

## 2018-03-01 PROCEDURE — 93892 TCD EMBOLI DETECT W/O INJ: CPT

## 2018-03-01 PROCEDURE — 99215 OFFICE O/P EST HI 40 MIN: CPT

## 2018-03-01 PROCEDURE — 93880 EXTRACRANIAL BILAT STUDY: CPT

## 2018-03-01 PROCEDURE — 93886 INTRACRANIAL COMPLETE STUDY: CPT

## 2018-03-09 ENCOUNTER — APPOINTMENT (OUTPATIENT)
Dept: CT IMAGING | Facility: CLINIC | Age: 48
End: 2018-03-09
Payer: COMMERCIAL

## 2018-03-09 ENCOUNTER — OUTPATIENT (OUTPATIENT)
Dept: OUTPATIENT SERVICES | Facility: HOSPITAL | Age: 48
LOS: 1 days | End: 2018-03-09
Payer: COMMERCIAL

## 2018-03-09 DIAGNOSIS — Z90.49 ACQUIRED ABSENCE OF OTHER SPECIFIED PARTS OF DIGESTIVE TRACT: Chronic | ICD-10-CM

## 2018-03-09 DIAGNOSIS — Z90.89 ACQUIRED ABSENCE OF OTHER ORGANS: Chronic | ICD-10-CM

## 2018-03-09 DIAGNOSIS — Z00.8 ENCOUNTER FOR OTHER GENERAL EXAMINATION: ICD-10-CM

## 2018-03-09 PROCEDURE — 71275 CT ANGIOGRAPHY CHEST: CPT | Mod: 26

## 2018-03-09 PROCEDURE — 82565 ASSAY OF CREATININE: CPT

## 2018-03-09 PROCEDURE — 71275 CT ANGIOGRAPHY CHEST: CPT

## 2018-03-27 ENCOUNTER — APPOINTMENT (OUTPATIENT)
Dept: NEUROLOGY | Facility: CLINIC | Age: 48
End: 2018-03-27

## 2018-04-19 ENCOUNTER — APPOINTMENT (OUTPATIENT)
Dept: ELECTROPHYSIOLOGY | Facility: CLINIC | Age: 48
End: 2018-04-19
Payer: COMMERCIAL

## 2018-04-19 VITALS
OXYGEN SATURATION: 97 % | DIASTOLIC BLOOD PRESSURE: 84 MMHG | SYSTOLIC BLOOD PRESSURE: 157 MMHG | BODY MASS INDEX: 30.78 KG/M2 | HEIGHT: 70 IN | HEART RATE: 84 BPM | WEIGHT: 215 LBS

## 2018-04-19 PROCEDURE — 93000 ELECTROCARDIOGRAM COMPLETE: CPT

## 2018-04-19 PROCEDURE — 99204 OFFICE O/P NEW MOD 45 MIN: CPT

## 2018-05-02 ENCOUNTER — APPOINTMENT (OUTPATIENT)
Dept: NUCLEAR MEDICINE | Facility: HOSPITAL | Age: 48
End: 2018-05-02
Payer: COMMERCIAL

## 2018-05-02 ENCOUNTER — OUTPATIENT (OUTPATIENT)
Dept: OUTPATIENT SERVICES | Facility: HOSPITAL | Age: 48
LOS: 1 days | End: 2018-05-02

## 2018-05-02 DIAGNOSIS — Z90.49 ACQUIRED ABSENCE OF OTHER SPECIFIED PARTS OF DIGESTIVE TRACT: Chronic | ICD-10-CM

## 2018-05-02 DIAGNOSIS — Z90.89 ACQUIRED ABSENCE OF OTHER ORGANS: Chronic | ICD-10-CM

## 2018-05-02 DIAGNOSIS — Q45.9 CONGENITAL MALFORMATION OF DIGESTIVE SYSTEM, UNSPECIFIED: ICD-10-CM

## 2018-05-02 PROCEDURE — 78428 CARDIAC SHUNT IMAGING: CPT | Mod: 26

## 2018-05-16 ENCOUNTER — OUTPATIENT (OUTPATIENT)
Dept: OUTPATIENT SERVICES | Facility: HOSPITAL | Age: 48
LOS: 1 days | Discharge: HOME | End: 2018-05-16

## 2018-05-16 DIAGNOSIS — Z90.89 ACQUIRED ABSENCE OF OTHER ORGANS: Chronic | ICD-10-CM

## 2018-05-16 DIAGNOSIS — Z90.49 ACQUIRED ABSENCE OF OTHER SPECIFIED PARTS OF DIGESTIVE TRACT: Chronic | ICD-10-CM

## 2018-05-16 DIAGNOSIS — D68.52 PROTHROMBIN GENE MUTATION: ICD-10-CM

## 2018-05-18 ENCOUNTER — APPOINTMENT (OUTPATIENT)
Dept: HEMATOLOGY ONCOLOGY | Facility: CLINIC | Age: 48
End: 2018-05-18

## 2018-06-15 ENCOUNTER — TRANSCRIPTION ENCOUNTER (OUTPATIENT)
Age: 48
End: 2018-06-15

## 2018-06-15 ENCOUNTER — INPATIENT (INPATIENT)
Facility: HOSPITAL | Age: 48
LOS: 0 days | Discharge: HOME | End: 2018-06-16
Attending: INTERNAL MEDICINE | Admitting: INTERNAL MEDICINE
Payer: COMMERCIAL

## 2018-06-15 VITALS
SYSTOLIC BLOOD PRESSURE: 111 MMHG | HEART RATE: 65 BPM | DIASTOLIC BLOOD PRESSURE: 74 MMHG | TEMPERATURE: 98 F | RESPIRATION RATE: 18 BRPM

## 2018-06-15 VITALS
TEMPERATURE: 98 F | WEIGHT: 179.9 LBS | HEIGHT: 69 IN | HEART RATE: 79 BPM | OXYGEN SATURATION: 100 % | RESPIRATION RATE: 20 BRPM | SYSTOLIC BLOOD PRESSURE: 102 MMHG | DIASTOLIC BLOOD PRESSURE: 70 MMHG

## 2018-06-15 DIAGNOSIS — Z90.49 ACQUIRED ABSENCE OF OTHER SPECIFIED PARTS OF DIGESTIVE TRACT: Chronic | ICD-10-CM

## 2018-06-15 DIAGNOSIS — Z90.89 ACQUIRED ABSENCE OF OTHER ORGANS: Chronic | ICD-10-CM

## 2018-06-15 LAB
ALBUMIN SERPL ELPH-MCNC: 4 G/DL — SIGNIFICANT CHANGE UP (ref 3.3–5)
ALP SERPL-CCNC: 77 U/L — SIGNIFICANT CHANGE UP (ref 40–120)
ALT FLD-CCNC: 41 U/L — SIGNIFICANT CHANGE UP (ref 12–78)
ANION GAP SERPL CALC-SCNC: 10 MMOL/L — SIGNIFICANT CHANGE UP (ref 5–17)
APTT BLD: 24.2 SEC — LOW (ref 27.5–37.4)
AST SERPL-CCNC: 25 U/L — SIGNIFICANT CHANGE UP (ref 15–37)
BASOPHILS # BLD AUTO: 0.06 K/UL — SIGNIFICANT CHANGE UP (ref 0–0.2)
BASOPHILS NFR BLD AUTO: 0.6 % — SIGNIFICANT CHANGE UP (ref 0–2)
BILIRUB SERPL-MCNC: 0.4 MG/DL — SIGNIFICANT CHANGE UP (ref 0.2–1.2)
BUN SERPL-MCNC: 20 MG/DL — SIGNIFICANT CHANGE UP (ref 7–23)
CALCIUM SERPL-MCNC: 9.2 MG/DL — SIGNIFICANT CHANGE UP (ref 8.5–10.1)
CHLORIDE SERPL-SCNC: 104 MMOL/L — SIGNIFICANT CHANGE UP (ref 96–108)
CO2 SERPL-SCNC: 24 MMOL/L — SIGNIFICANT CHANGE UP (ref 22–31)
CREAT SERPL-MCNC: 1.29 MG/DL — SIGNIFICANT CHANGE UP (ref 0.5–1.3)
EOSINOPHIL # BLD AUTO: 0.25 K/UL — SIGNIFICANT CHANGE UP (ref 0–0.5)
EOSINOPHIL NFR BLD AUTO: 2.4 % — SIGNIFICANT CHANGE UP (ref 0–6)
GLUCOSE SERPL-MCNC: 213 MG/DL — HIGH (ref 70–99)
HCT VFR BLD CALC: 44.2 % — SIGNIFICANT CHANGE UP (ref 39–50)
HGB BLD-MCNC: 15.3 G/DL — SIGNIFICANT CHANGE UP (ref 13–17)
IMM GRANULOCYTES NFR BLD AUTO: 0.4 % — SIGNIFICANT CHANGE UP (ref 0–1.5)
INR BLD: 1.11 RATIO — SIGNIFICANT CHANGE UP (ref 0.88–1.16)
LYMPHOCYTES # BLD AUTO: 3.28 K/UL — SIGNIFICANT CHANGE UP (ref 1–3.3)
LYMPHOCYTES # BLD AUTO: 31.7 % — SIGNIFICANT CHANGE UP (ref 13–44)
MCHC RBC-ENTMCNC: 29.4 PG — SIGNIFICANT CHANGE UP (ref 27–34)
MCHC RBC-ENTMCNC: 34.6 GM/DL — SIGNIFICANT CHANGE UP (ref 32–36)
MCV RBC AUTO: 84.8 FL — SIGNIFICANT CHANGE UP (ref 80–100)
MONOCYTES # BLD AUTO: 0.62 K/UL — SIGNIFICANT CHANGE UP (ref 0–0.9)
MONOCYTES NFR BLD AUTO: 6 % — SIGNIFICANT CHANGE UP (ref 2–14)
NEUTROPHILS # BLD AUTO: 6.11 K/UL — SIGNIFICANT CHANGE UP (ref 1.8–7.4)
NEUTROPHILS NFR BLD AUTO: 58.9 % — SIGNIFICANT CHANGE UP (ref 43–77)
NRBC # BLD: 0 /100 WBCS — SIGNIFICANT CHANGE UP (ref 0–0)
PLATELET # BLD AUTO: 243 K/UL — SIGNIFICANT CHANGE UP (ref 150–400)
POTASSIUM SERPL-MCNC: 3.2 MMOL/L — LOW (ref 3.5–5.3)
POTASSIUM SERPL-SCNC: 3.2 MMOL/L — LOW (ref 3.5–5.3)
PROT SERPL-MCNC: 7 GM/DL — SIGNIFICANT CHANGE UP (ref 6–8.3)
PROTHROM AB SERPL-ACNC: 12 SEC — SIGNIFICANT CHANGE UP (ref 9.8–12.7)
RBC # BLD: 5.21 M/UL — SIGNIFICANT CHANGE UP (ref 4.2–5.8)
RBC # FLD: 13 % — SIGNIFICANT CHANGE UP (ref 10.3–14.5)
SODIUM SERPL-SCNC: 138 MMOL/L — SIGNIFICANT CHANGE UP (ref 135–145)
TROPONIN I SERPL-MCNC: <0.015 NG/ML — SIGNIFICANT CHANGE UP (ref 0.01–0.04)
TROPONIN I SERPL-MCNC: <0.015 NG/ML — SIGNIFICANT CHANGE UP (ref 0.01–0.04)
WBC # BLD: 10.36 K/UL — SIGNIFICANT CHANGE UP (ref 3.8–10.5)
WBC # FLD AUTO: 10.36 K/UL — SIGNIFICANT CHANGE UP (ref 3.8–10.5)

## 2018-06-15 PROCEDURE — 93010 ELECTROCARDIOGRAM REPORT: CPT

## 2018-06-15 PROCEDURE — 99285 EMERGENCY DEPT VISIT HI MDM: CPT | Mod: 25

## 2018-06-15 PROCEDURE — 70450 CT HEAD/BRAIN W/O DYE: CPT | Mod: 26

## 2018-06-15 RX ORDER — CHOLECALCIFEROL (VITAMIN D3) 125 MCG
1 CAPSULE ORAL
Qty: 0 | Refills: 0 | COMMUNITY

## 2018-06-15 RX ORDER — AMLODIPINE BESYLATE 2.5 MG/1
10 TABLET ORAL DAILY
Qty: 0 | Refills: 0 | Status: DISCONTINUED | OUTPATIENT
Start: 2018-06-15 | End: 2018-06-16

## 2018-06-15 RX ORDER — ASPIRIN/CALCIUM CARB/MAGNESIUM 324 MG
325 TABLET ORAL ONCE
Qty: 0 | Refills: 0 | Status: COMPLETED | OUTPATIENT
Start: 2018-06-15 | End: 2018-06-15

## 2018-06-15 RX ORDER — ASPIRIN/CALCIUM CARB/MAGNESIUM 324 MG
81 TABLET ORAL DAILY
Qty: 0 | Refills: 0 | Status: DISCONTINUED | OUTPATIENT
Start: 2018-06-15 | End: 2018-06-16

## 2018-06-15 RX ORDER — POTASSIUM CHLORIDE 20 MEQ
40 PACKET (EA) ORAL ONCE
Qty: 0 | Refills: 0 | Status: COMPLETED | OUTPATIENT
Start: 2018-06-15 | End: 2018-06-15

## 2018-06-15 RX ORDER — CLOPIDOGREL BISULFATE 75 MG/1
1 TABLET, FILM COATED ORAL
Qty: 30 | Refills: 0 | OUTPATIENT
Start: 2018-06-15

## 2018-06-15 RX ORDER — CLOPIDOGREL BISULFATE 75 MG/1
75 TABLET, FILM COATED ORAL DAILY
Qty: 0 | Refills: 0 | Status: DISCONTINUED | OUTPATIENT
Start: 2018-06-15 | End: 2018-06-16

## 2018-06-15 RX ORDER — AMLODIPINE BESYLATE 2.5 MG/1
1 TABLET ORAL
Qty: 0 | Refills: 0 | COMMUNITY

## 2018-06-15 RX ADMIN — AMLODIPINE BESYLATE 10 MILLIGRAM(S): 2.5 TABLET ORAL at 11:02

## 2018-06-15 RX ADMIN — CLOPIDOGREL BISULFATE 75 MILLIGRAM(S): 75 TABLET, FILM COATED ORAL at 11:02

## 2018-06-15 RX ADMIN — Medication 325 MILLIGRAM(S): at 03:52

## 2018-06-15 RX ADMIN — Medication 40 MILLIEQUIVALENT(S): at 02:00

## 2018-06-15 NOTE — DISCHARGE NOTE ADULT - PLAN OF CARE
follow up continue your usual medications with the addition of plavix until notified otherwise by your neurologist  Follow up with Dr. Hoover  Follow up for loop recorder

## 2018-06-15 NOTE — ED ADULT NURSE REASSESSMENT NOTE - NS ED NURSE REASSESS COMMENT FT1
Pt. states no symptoms at this time "I feel good" will continue to monitor
Pt rec'd from [facility] at 0700. AxOx4. No acute distress. No chest pain. No neurological deficit. Pt is being discharged today. Safety and comfort maintained.

## 2018-06-15 NOTE — ED ADULT NURSE NOTE - CHPI ED SYMPTOMS NEG
no blurred vision/no vomiting/no dizziness/no numbness/no weakness/no confusion/no fever/no loss of consciousness/no nausea/no change in level of consciousness

## 2018-06-15 NOTE — ED PROVIDER NOTE - OBJECTIVE STATEMENT
47 yo male c/o left sided weakness and difficulty speaking.  Symptoms started about 10 min prior to calling 911 and resolved completely in the ambulance on the way to the ED.  Pt had similar symptoms in Jan 2018, with waxing and waning of symptoms that eventually persisted and TPA was given.  He has had neg CTs, CTAs and MRI and is scheduled for loop recorder placement next week.  Pt follows with Dr Hoover at [facility].  In ED pt is asymptomatic.  Takes 81mg asa daily, no other anti-coagulation.

## 2018-06-15 NOTE — DISCHARGE NOTE ADULT - PATIENT PORTAL LINK FT
You can access the [facility] Patient Portal, offered by [facility], by registering with the following website: [email or web address]

## 2018-06-15 NOTE — DISCHARGE NOTE ADULT - HOSPITAL COURSE
History/Physical and dishcharge note for date of service 6/15    PMD: Linker  Neuro: Jospeh Hoover  Cardio Somek    47 yo M hx of TIA/CVA got tpa in Jan 2018 etiology not clear after extensive evaluation presented to  after TIA at home last night.  Pt reports he was going downstairs last night around 11:30 PM and suddenly felt numbness in the left side of his face followed by profound weakness in the LLE and LUE requiring him to sit down because of inability to walk.  Immediately EMS was called and during transport to  ED his symptoms resolved completely.  By the time pt was in  ED he was completely asymptomatic.  Pt has not had any TIA symptoms since his last TIA/CVA.  Pt has been following with neurology and cardiology on an outpt basis and has had an extensive evaluation which has been unrevealing, he is planned for and ILD today on an outpt basis.      In the ED his VS have been wnl.  Lab eval without any significant abnormalities.  CTH was unremarkable.  Pt does not want to have MRI.      On my evaluation pt is completely asymptomatic.  I discussed the case with neurology, Dr. Hernández who recommends adding plavix and discharging pt as evaluation has been completed and pt does not want MRI.  I made multiple attempts to contact Dr. Townsend office to notify of event, on first attempt phone rang for several minutes without answer or message on multiple subsequent attempts phone went to busy without ringing.      PMH: Htn, TIA/CVA, HLD  PSH: No significant PSH- appendectomy  FH: No family hx of MI or CVA  SH: No EtOH , former smoker quit 6 month ago (was smoking on/off for approx 30 yrs max 1/2 ppd), no illicit use hx    REVIEW OF SYSTEMS:  CONSTITUTIONAL: No weakness, fevers or chills  EYES/ENT: No visual changes;  No vertigo or throat pain   NECK: No pain or stiffness  RESPIRATORY: No cough, wheezing, hemoptysis; No shortness of breath  CARDIOVASCULAR: No chest pain or palpitations  GASTROINTESTINAL: No abdominal or epigastric pain. No nausea, vomiting, or hematemesis; No diarrhea or constipation. No melena or hematochezia.  GENITOURINARY: No dysuria, frequency or hematuria  NEUROLOGICAL: No numbness or weakness  SKIN: No itching, burning, rashes, or lesions   All other review of systems is negative unless indicated above.    Vital Signs Last 24 Hrs  T(C): 36.7 (15 Gordo 2018 09:30), Max: 36.7 (15 Gordo 2018 00:33)  T(F): 98.1 (15 Gordo 2018 09:30), Max: 98.1 (15 Gordo 2018 09:30)  HR: 65 (15 Gordo 2018 09:30) (61 - 79)  BP: 111/74 (15 Gordo 2018 09:30) (102/70 - 119/75)  RR: 18 (15 Gordo 2018 09:30) (17 - 20)  SpO2: 99% (15 Gordo 2018 08:38) (99% - 100%)    PHYSICAL EXAM:  Constitutional: NAD, well-groomed, well-developed  HEENT: PERRLA, EOMI, Normal Hearing, MMM  Neck: No LAD, No JVD  Back: Normal spine flexure, No CVA tenderness  Respiratory: CTABL  Cardiovascular: S1 and S2, RRR, no M/G/R  Gastrointestinal: BS+, soft, NT/ND  Extremities: No peripheral edema  Vascular: 2+ peripheral pulses  Neurological: A/O x 3, no focal deficits  Psychiatric: Normal mood, normal affect  Musculoskeletal: 5/5 strength b/l upper and lower extremities  Skin: No rashes               15.3   10.36 )-----------( 243      ( 15 Gordo 2018 00:49 )             44.2     138  |  104  |  20  ----------------------------<  213<H>  3.2<L>   |  24  |  1.29    CARDIAC MARKERS ( 15 Gordo 2018 03:38 )  <0.015 ng/mL / x     / x     / x     / x      CARDIAC MARKERS ( 15 Gordo 2018 00:49 )  <0.015 ng/mL / x     / x     / x     / x        LIVER FUNCTIONS - ( 15 Gordo 2018 00:49 )  Alb: 4.0 g/dL / Pro: 7.0 gm/dL / ALK PHOS: 77 U/L / ALT: 41 U/L / AST: 25 U/L / GGT: x           PT/INR - ( 15 Gordo 2018 00:49 )   PT: 12.0 sec;   INR: 1.11 ratio    PTT - ( 15 Gordo 2018 00:49 )  PTT:24.2 sec    A/P  47 yo M presented to  after TIA which has since completely resolved.    # TIA- resolved:   Pt has been following with neurology and cardiology on an outpt basis and has had an extensive evaluation which has been unrevealing, he is planned for and ILD today on an outpt basis.  Pt does not want to have MRI.  No further workup is indicated.  Case discussed with neurology, Dr. Hernández who recommends adding plavix and discharging pt as evaluation has been completed and pt does not want MRI.  I made multiple attempts to contact Dr. Townsend office to notify of event, on first attempt phone rang for several minutes without answer or message on multiple subsequent attempts phone went to busy without ringing.  Pt notified to contact Dr. Townsend office.    # HTN, HLD: continue home meds    Time spent on H/P, Discharge - 65 minutes History/Physical and dishcharge note for date of service 6/15    PMD: Linker  Neuro: Joseph Hoover  Cardio Somek    49 yo M hx of TIA/CVA got tpa in Jan 2018 etiology not clear after extensive evaluation presented to  after TIA at home last night.  Pt reports he was going downstairs last night around 11:30 PM and suddenly felt numbness in the left side of his face followed by profound weakness in the LLE and LUE requiring him to sit down because of inability to walk.  Immediately EMS was called and during transport to  ED his symptoms resolved completely.  By the time pt was in  ED he was completely asymptomatic.  Pt has not had any TIA symptoms since his last TIA/CVA.  Pt has been following with neurology and cardiology on an outpt basis and has had an extensive evaluation which has been unrevealing, he is planned for and ILD today on an outpt basis.      In the ED his VS have been wnl.  Lab eval without any significant abnormalities.  CTH was unremarkable.  Pt does not want to have MRI.      On my evaluation pt is completely asymptomatic.  I discussed the case with neurology, Dr. Hernández who recommends adding plavix and discharging pt as evaluation has been completed and pt does not want MRI.  I made multiple attempts to contact Dr. Townsend office to notify of event, on first attempt phone rang for several minutes without answer or message on multiple subsequent attempts phone went to busy without ringing.      PMH: Htn, TIA/CVA, HLD  PSH: No significant PSH- appendectomy  FH: No family hx of MI or CVA  SH: No EtOH , former smoker quit 6 month ago (was smoking on/off for approx 30 yrs max 1/2 ppd), no illicit use hx    REVIEW OF SYSTEMS:  CONSTITUTIONAL: No weakness, fevers or chills  EYES/ENT: No visual changes;  No vertigo or throat pain   NECK: No pain or stiffness  RESPIRATORY: No cough, wheezing, hemoptysis; No shortness of breath  CARDIOVASCULAR: No chest pain or palpitations  GASTROINTESTINAL: No abdominal or epigastric pain. No nausea, vomiting, or hematemesis; No diarrhea or constipation. No melena or hematochezia.  GENITOURINARY: No dysuria, frequency or hematuria  NEUROLOGICAL: No numbness or weakness  SKIN: No itching, burning, rashes, or lesions   All other review of systems is negative unless indicated above.    Vital Signs Last 24 Hrs  T(C): 36.7 (15 Gordo 2018 09:30), Max: 36.7 (15 Gordo 2018 00:33)  T(F): 98.1 (15 Gordo 2018 09:30), Max: 98.1 (15 Gordo 2018 09:30)  HR: 65 (15 Gordo 2018 09:30) (61 - 79)  BP: 111/74 (15 Gordo 2018 09:30) (102/70 - 119/75)  RR: 18 (15 Gordo 2018 09:30) (17 - 20)  SpO2: 99% (15 Gordo 2018 08:38) (99% - 100%)    PHYSICAL EXAM:  Constitutional: NAD, well-groomed, well-developed  HEENT: PERRLA, EOMI, Normal Hearing, MMM  Neck: No LAD, No JVD  Back: Normal spine flexure, No CVA tenderness  Respiratory: CTABL  Cardiovascular: S1 and S2, RRR, no M/G/R  Gastrointestinal: BS+, soft, NT/ND  Extremities: No peripheral edema  Vascular: 2+ peripheral pulses  Neurological: A/O x 3, no focal deficits  Psychiatric: Normal mood, normal affect  Musculoskeletal: 5/5 strength b/l upper and lower extremities  Skin: No rashes               15.3   10.36 )-----------( 243      ( 15 Gordo 2018 00:49 )             44.2     138  |  104  |  20  ----------------------------<  213<H>  3.2<L>   |  24  |  1.29    CARDIAC MARKERS ( 15 Gordo 2018 03:38 )  <0.015 ng/mL / x     / x     / x     / x      CARDIAC MARKERS ( 15 Gordo 2018 00:49 )  <0.015 ng/mL / x     / x     / x     / x        LIVER FUNCTIONS - ( 15 Gordo 2018 00:49 )  Alb: 4.0 g/dL / Pro: 7.0 gm/dL / ALK PHOS: 77 U/L / ALT: 41 U/L / AST: 25 U/L / GGT: x           PT/INR - ( 15 Gordo 2018 00:49 )   PT: 12.0 sec;   INR: 1.11 ratio    PTT - ( 15 Gordo 2018 00:49 )  PTT:24.2 sec    A/P  49 yo M presented to  after TIA which has since completely resolved.    # TIA- resolved:   Pt has been following with neurology and cardiology on an outpt basis and has had an extensive evaluation which has been unrevealing, he is planned for and ILD today on an outpt basis.  Pt does not want to have MRI.  No further workup is indicated.  Case discussed with neurology, Dr. Hernández who recommends adding plavix and discharging pt as evaluation has been completed and pt does not want MRI. Contacted Dr. Hoover's office to notify, message left with office staff for Dr. Hoover to call me back, awaiting callback.    # HTN, HLD: continue home meds    Time spent on H/P, Discharge - 65 minutes

## 2018-06-15 NOTE — DISCHARGE NOTE ADULT - MEDICATION SUMMARY - MEDICATIONS TO TAKE
I will START or STAY ON the medications listed below when I get home from the hospital:    aspirin 81 mg oral delayed release tablet  -- 1 tab(s) by mouth once a day  -- Indication: For .    Crestor 10 mg oral tablet  -- 1 tab(s) by mouth once a day (at bedtime)  -- Indication: For .    clopidogrel 75 mg oral tablet  -- 1 tab(s) by mouth once a day  -- Indication: For .    Norvasc 10 mg oral tablet  -- 1 tab(s) by mouth once a day  -- Indication: For . I will START or STAY ON the medications listed below when I get home from the hospital:    aspirin 81 mg oral delayed release tablet  -- 1 tab(s) by mouth once a day  -- Indication: For stroke prevention    Crestor 10 mg oral tablet  -- 1 tab(s) by mouth once a day (at bedtime)  -- Indication: For cholesterol    clopidogrel 75 mg oral tablet  -- 1 tab(s) by mouth once a day  -- Indication: For stroke preevention    Norvasc 10 mg oral tablet  -- 1 tab(s) by mouth once a day  -- Indication: For blood pressure

## 2018-06-15 NOTE — DISCHARGE NOTE ADULT - CARE PLAN
Principal Discharge DX:	TIA (transient ischemic attack)  Goal:	follow up  Assessment and plan of treatment:	continue your usual medications with the addition of plavix until notified otherwise by your neurologist  Follow up with Dr. Hoover  Follow up for loop recorder

## 2018-06-15 NOTE — ED ADULT NURSE NOTE - OBJECTIVE STATEMENT
pt. c/o of left sided weakness, facial numbness, diaphoresis, slurred speech and dizziness. Pt. wife states symptoms lasted approx 20mins and resolved upon transport with EMS. Pt. has hx of stroke in Jan 2018 with TPA given and no residual side effects. Pt. states he takes baby ASA every day, no other blood thinners. Pt. speech is clear with +PMSx4, no strength deficits or focal deficits, A&Ox4.

## 2018-06-15 NOTE — ED ADULT TRIAGE NOTE - CHIEF COMPLAINT QUOTE
Pt presents to the ED with stroke like symptoms PTA which have resolved at triage, MD brought to bedside to evaluate pt. Pt brought to CT from triage but not code stroke.  Pt has a h/o stroke and received TPA in January.

## 2018-06-15 NOTE — DISCHARGE NOTE ADULT - CARE PROVIDER_API CALL
Joseph Hoover), Neurology; Vascular Neurology  [address] [place] [address]  Phone: [phone]  Fax: [phone]

## 2018-06-19 ENCOUNTER — APPOINTMENT (OUTPATIENT)
Dept: NEUROLOGY | Facility: CLINIC | Age: 48
End: 2018-06-19
Payer: COMMERCIAL

## 2018-06-19 VITALS
SYSTOLIC BLOOD PRESSURE: 123 MMHG | HEIGHT: 70 IN | BODY MASS INDEX: 30.78 KG/M2 | DIASTOLIC BLOOD PRESSURE: 86 MMHG | HEART RATE: 80 BPM | WEIGHT: 215 LBS

## 2018-06-19 PROCEDURE — 99215 OFFICE O/P EST HI 40 MIN: CPT

## 2018-06-19 RX ORDER — ASPIRIN ENTERIC COATED TABLETS 81 MG 81 MG/1
81 TABLET, DELAYED RELEASE ORAL DAILY
Refills: 0 | Status: DISCONTINUED | COMMUNITY
End: 2018-06-19

## 2018-06-19 RX ORDER — CHROMIUM 200 MCG
TABLET ORAL
Refills: 0 | Status: DISCONTINUED | COMMUNITY
End: 2018-06-19

## 2018-06-20 ENCOUNTER — APPOINTMENT (OUTPATIENT)
Dept: NEUROLOGY | Facility: CLINIC | Age: 48
End: 2018-06-20

## 2018-06-20 ENCOUNTER — OTHER (OUTPATIENT)
Age: 48
End: 2018-06-20

## 2018-06-20 DIAGNOSIS — E78.5 HYPERLIPIDEMIA, UNSPECIFIED: ICD-10-CM

## 2018-06-20 DIAGNOSIS — Z86.73 PERSONAL HISTORY OF TRANSIENT ISCHEMIC ATTACK (TIA), AND CEREBRAL INFARCTION WITHOUT RESIDUAL DEFICITS: ICD-10-CM

## 2018-06-20 DIAGNOSIS — Z88.0 ALLERGY STATUS TO PENICILLIN: ICD-10-CM

## 2018-06-20 DIAGNOSIS — E87.6 HYPOKALEMIA: ICD-10-CM

## 2018-06-20 DIAGNOSIS — G45.9 TRANSIENT CEREBRAL ISCHEMIC ATTACK, UNSPECIFIED: ICD-10-CM

## 2018-06-22 ENCOUNTER — OUTPATIENT (OUTPATIENT)
Dept: OUTPATIENT SERVICES | Facility: HOSPITAL | Age: 48
LOS: 1 days | End: 2018-06-22
Payer: COMMERCIAL

## 2018-06-22 VITALS
SYSTOLIC BLOOD PRESSURE: 116 MMHG | RESPIRATION RATE: 18 BRPM | WEIGHT: 214.95 LBS | OXYGEN SATURATION: 97 % | DIASTOLIC BLOOD PRESSURE: 72 MMHG | HEART RATE: 70 BPM | HEIGHT: 70 IN | TEMPERATURE: 98 F

## 2018-06-22 DIAGNOSIS — I66.9 OCCLUSION AND STENOSIS OF UNSPECIFIED CEREBRAL ARTERY: ICD-10-CM

## 2018-06-22 DIAGNOSIS — Z90.49 ACQUIRED ABSENCE OF OTHER SPECIFIED PARTS OF DIGESTIVE TRACT: Chronic | ICD-10-CM

## 2018-06-22 DIAGNOSIS — Z90.89 ACQUIRED ABSENCE OF OTHER ORGANS: Chronic | ICD-10-CM

## 2018-06-22 DIAGNOSIS — Z98.890 OTHER SPECIFIED POSTPROCEDURAL STATES: Chronic | ICD-10-CM

## 2018-06-22 PROCEDURE — 93010 ELECTROCARDIOGRAM REPORT: CPT

## 2018-06-22 PROCEDURE — C1764: CPT

## 2018-06-22 PROCEDURE — 33282: CPT

## 2018-06-22 PROCEDURE — 93005 ELECTROCARDIOGRAM TRACING: CPT

## 2018-06-22 RX ORDER — SODIUM CHLORIDE 9 MG/ML
3 INJECTION INTRAMUSCULAR; INTRAVENOUS; SUBCUTANEOUS EVERY 8 HOURS
Qty: 0 | Refills: 0 | Status: DISCONTINUED | OUTPATIENT
Start: 2018-06-22 | End: 2018-07-07

## 2018-06-22 NOTE — H&P CARDIOLOGY - HISTORY OF PRESENT ILLNESS
48 year old left handed  male 2ith Hx of HTN, HLD recent TIA (1/2018 and 6/15/2018), former smoker presents for Loop recorder insertion. pt states he had complete left sided paralysis for about 25minutes, went to [facility] ER and released after negative head CT scan. pt had f/u visit with Dr. Aguilar and referred for further evaluation recorder. pt denies weakness, fatigue or palpitation. 48 year old left handed  male with Hx of HTN, HLD recent TIA (1/2018 and 6/15/2018), former smoker presents for Loop recorder insertion. pt states he had complete left sided paralysis & relieved after about 25minutes, went to [facility] ER and released after negative head CT scan. pt had f/u visit with Dr. Aguilar and Dr. Hoover prior this event since January TIA, had scheduled for MRI and Loop recorder implant. Pt denies weakness, fatigue or palpitation currently, full strength in extremities.

## 2018-06-22 NOTE — H&P CARDIOLOGY - FAMILY HISTORY
1-2 drinks Grandparent  Still living? No  Family history of heart disease, Age at diagnosis: Age Unknown

## 2018-06-22 NOTE — H&P CARDIOLOGY - PSH
History of lithotripsy    S/P tonsillectomy    Status post appendectomy    Status post cholecystectomy

## 2018-06-22 NOTE — H&P CARDIOLOGY - PMH
Calculus of ureter    CVA (cerebral vascular accident)    Gall stone    HLD (hyperlipidemia)    HTN (hypertension)    Kidney stone    TIA (transient ischemic attack)

## 2018-06-27 ENCOUNTER — APPOINTMENT (OUTPATIENT)
Dept: MRI IMAGING | Facility: CLINIC | Age: 48
End: 2018-06-27

## 2018-06-28 ENCOUNTER — FORM ENCOUNTER (OUTPATIENT)
Age: 48
End: 2018-06-28

## 2018-06-29 ENCOUNTER — OUTPATIENT (OUTPATIENT)
Dept: OUTPATIENT SERVICES | Facility: HOSPITAL | Age: 48
LOS: 1 days | End: 2018-06-29
Payer: COMMERCIAL

## 2018-06-29 ENCOUNTER — APPOINTMENT (OUTPATIENT)
Dept: CT IMAGING | Facility: CLINIC | Age: 48
End: 2018-06-29
Payer: COMMERCIAL

## 2018-06-29 DIAGNOSIS — Z00.8 ENCOUNTER FOR OTHER GENERAL EXAMINATION: ICD-10-CM

## 2018-06-29 DIAGNOSIS — Z98.890 OTHER SPECIFIED POSTPROCEDURAL STATES: Chronic | ICD-10-CM

## 2018-06-29 DIAGNOSIS — Z90.49 ACQUIRED ABSENCE OF OTHER SPECIFIED PARTS OF DIGESTIVE TRACT: Chronic | ICD-10-CM

## 2018-06-29 DIAGNOSIS — Z90.89 ACQUIRED ABSENCE OF OTHER ORGANS: Chronic | ICD-10-CM

## 2018-06-29 PROCEDURE — 71260 CT THORAX DX C+: CPT

## 2018-06-29 PROCEDURE — 74177 CT ABD & PELVIS W/CONTRAST: CPT | Mod: 26

## 2018-06-29 PROCEDURE — 74177 CT ABD & PELVIS W/CONTRAST: CPT

## 2018-06-29 PROCEDURE — 71260 CT THORAX DX C+: CPT | Mod: 26

## 2018-07-05 ENCOUNTER — APPOINTMENT (OUTPATIENT)
Dept: MRI IMAGING | Facility: CLINIC | Age: 48
End: 2018-07-05

## 2018-07-06 ENCOUNTER — APPOINTMENT (OUTPATIENT)
Dept: ELECTROPHYSIOLOGY | Facility: CLINIC | Age: 48
End: 2018-07-06
Payer: COMMERCIAL

## 2018-07-06 ENCOUNTER — OUTPATIENT (OUTPATIENT)
Dept: OUTPATIENT SERVICES | Facility: HOSPITAL | Age: 48
LOS: 1 days | End: 2018-07-06
Payer: COMMERCIAL

## 2018-07-06 ENCOUNTER — NON-APPOINTMENT (OUTPATIENT)
Age: 48
End: 2018-07-06

## 2018-07-06 ENCOUNTER — APPOINTMENT (OUTPATIENT)
Dept: MRI IMAGING | Facility: CLINIC | Age: 48
End: 2018-07-06

## 2018-07-06 VITALS — SYSTOLIC BLOOD PRESSURE: 128 MMHG | OXYGEN SATURATION: 99 % | DIASTOLIC BLOOD PRESSURE: 86 MMHG | HEART RATE: 73 BPM

## 2018-07-06 DIAGNOSIS — Z90.89 ACQUIRED ABSENCE OF OTHER ORGANS: Chronic | ICD-10-CM

## 2018-07-06 DIAGNOSIS — Z90.49 ACQUIRED ABSENCE OF OTHER SPECIFIED PARTS OF DIGESTIVE TRACT: Chronic | ICD-10-CM

## 2018-07-06 DIAGNOSIS — Z00.8 ENCOUNTER FOR OTHER GENERAL EXAMINATION: ICD-10-CM

## 2018-07-06 DIAGNOSIS — Z98.890 OTHER SPECIFIED POSTPROCEDURAL STATES: Chronic | ICD-10-CM

## 2018-07-06 PROCEDURE — 70544 MR ANGIOGRAPHY HEAD W/O DYE: CPT

## 2018-07-06 PROCEDURE — 99024 POSTOP FOLLOW-UP VISIT: CPT

## 2018-07-06 PROCEDURE — 70553 MRI BRAIN STEM W/O & W/DYE: CPT

## 2018-07-06 PROCEDURE — 70553 MRI BRAIN STEM W/O & W/DYE: CPT | Mod: 26

## 2018-07-06 PROCEDURE — A9585: CPT

## 2018-07-11 ENCOUNTER — FORM ENCOUNTER (OUTPATIENT)
Age: 48
End: 2018-07-11

## 2018-07-12 ENCOUNTER — OUTPATIENT (OUTPATIENT)
Dept: OUTPATIENT SERVICES | Facility: HOSPITAL | Age: 48
LOS: 1 days | End: 2018-07-12
Payer: COMMERCIAL

## 2018-07-12 ENCOUNTER — APPOINTMENT (OUTPATIENT)
Dept: MRI IMAGING | Facility: CLINIC | Age: 48
End: 2018-07-12
Payer: COMMERCIAL

## 2018-07-12 DIAGNOSIS — Z00.8 ENCOUNTER FOR OTHER GENERAL EXAMINATION: ICD-10-CM

## 2018-07-12 DIAGNOSIS — Z90.89 ACQUIRED ABSENCE OF OTHER ORGANS: Chronic | ICD-10-CM

## 2018-07-12 DIAGNOSIS — Z90.49 ACQUIRED ABSENCE OF OTHER SPECIFIED PARTS OF DIGESTIVE TRACT: Chronic | ICD-10-CM

## 2018-07-12 DIAGNOSIS — Z98.890 OTHER SPECIFIED POSTPROCEDURAL STATES: Chronic | ICD-10-CM

## 2018-07-12 PROCEDURE — 70549 MR ANGIOGRAPH NECK W/O&W/DYE: CPT | Mod: 26

## 2018-07-12 PROCEDURE — A9585: CPT

## 2018-07-12 PROCEDURE — 70549 MR ANGIOGRAPH NECK W/O&W/DYE: CPT

## 2018-07-30 ENCOUNTER — APPOINTMENT (OUTPATIENT)
Dept: NEUROLOGY | Facility: CLINIC | Age: 48
End: 2018-07-30
Payer: COMMERCIAL

## 2018-07-30 PROCEDURE — 95819 EEG AWAKE AND ASLEEP: CPT

## 2018-07-31 ENCOUNTER — OTHER (OUTPATIENT)
Age: 48
End: 2018-07-31

## 2018-07-31 PROCEDURE — 95953: CPT

## 2018-08-01 PROCEDURE — 95953: CPT

## 2018-08-03 ENCOUNTER — OTHER (OUTPATIENT)
Age: 48
End: 2018-08-03

## 2018-08-07 ENCOUNTER — APPOINTMENT (OUTPATIENT)
Dept: ELECTROPHYSIOLOGY | Facility: CLINIC | Age: 48
End: 2018-08-07
Payer: COMMERCIAL

## 2018-08-07 DIAGNOSIS — Z86.73 PERSONAL HISTORY OF TRANSIENT ISCHEMIC ATTACK (TIA), AND CEREBRAL INFARCTION W/OUT RESIDUAL DEFICITS: ICD-10-CM

## 2018-08-07 PROCEDURE — 93298 REM INTERROG DEV EVAL SCRMS: CPT

## 2018-08-21 ENCOUNTER — APPOINTMENT (OUTPATIENT)
Dept: NEUROLOGY | Facility: CLINIC | Age: 48
End: 2018-08-21
Payer: COMMERCIAL

## 2018-08-21 VITALS
SYSTOLIC BLOOD PRESSURE: 125 MMHG | BODY MASS INDEX: 32.5 KG/M2 | WEIGHT: 227 LBS | HEIGHT: 70 IN | DIASTOLIC BLOOD PRESSURE: 82 MMHG | HEART RATE: 76 BPM

## 2018-08-21 PROCEDURE — 99215 OFFICE O/P EST HI 40 MIN: CPT

## 2018-08-22 ENCOUNTER — OTHER (OUTPATIENT)
Age: 48
End: 2018-08-22

## 2018-08-22 PROBLEM — E78.5 HYPERLIPIDEMIA, UNSPECIFIED: Chronic | Status: ACTIVE | Noted: 2018-06-22

## 2018-08-22 PROBLEM — I63.9 CEREBRAL INFARCTION, UNSPECIFIED: Chronic | Status: ACTIVE | Noted: 2018-06-22

## 2018-08-22 PROBLEM — G45.9 TRANSIENT CEREBRAL ISCHEMIC ATTACK, UNSPECIFIED: Chronic | Status: ACTIVE | Noted: 2018-06-22

## 2018-08-22 PROBLEM — K80.20 CALCULUS OF GALLBLADDER WITHOUT CHOLECYSTITIS WITHOUT OBSTRUCTION: Chronic | Status: ACTIVE | Noted: 2018-06-22

## 2018-08-22 PROBLEM — N20.0 CALCULUS OF KIDNEY: Chronic | Status: ACTIVE | Noted: 2018-06-22

## 2018-08-22 PROBLEM — I10 ESSENTIAL (PRIMARY) HYPERTENSION: Chronic | Status: ACTIVE | Noted: 2018-06-22

## 2018-10-08 ENCOUNTER — APPOINTMENT (OUTPATIENT)
Dept: ELECTROPHYSIOLOGY | Facility: CLINIC | Age: 48
End: 2018-10-08
Payer: COMMERCIAL

## 2018-10-08 PROCEDURE — 93299: CPT

## 2018-10-08 PROCEDURE — 93298 REM INTERROG DEV EVAL SCRMS: CPT

## 2018-11-13 PROBLEM — Z86.73 HISTORY OF STROKE: Status: RESOLVED | Noted: 2018-04-19 | Resolved: 2018-11-13

## 2018-12-06 ENCOUNTER — APPOINTMENT (OUTPATIENT)
Dept: NEUROLOGY | Facility: CLINIC | Age: 48
End: 2018-12-06
Payer: COMMERCIAL

## 2018-12-06 VITALS
BODY MASS INDEX: 31.5 KG/M2 | SYSTOLIC BLOOD PRESSURE: 149 MMHG | WEIGHT: 220 LBS | DIASTOLIC BLOOD PRESSURE: 89 MMHG | HEIGHT: 70 IN | HEART RATE: 77 BPM

## 2018-12-06 PROCEDURE — 99215 OFFICE O/P EST HI 40 MIN: CPT

## 2018-12-06 RX ORDER — LORAZEPAM 1 MG/1
1 TABLET ORAL
Qty: 4 | Refills: 0 | Status: DISCONTINUED | COMMUNITY
Start: 2018-06-29 | End: 2018-12-06

## 2018-12-07 ENCOUNTER — OTHER (OUTPATIENT)
Age: 48
End: 2018-12-07

## 2018-12-07 ENCOUNTER — APPOINTMENT (OUTPATIENT)
Dept: ELECTROPHYSIOLOGY | Facility: CLINIC | Age: 48
End: 2018-12-07
Payer: COMMERCIAL

## 2018-12-07 VITALS
OXYGEN SATURATION: 98 % | DIASTOLIC BLOOD PRESSURE: 79 MMHG | HEIGHT: 70 IN | HEART RATE: 74 BPM | BODY MASS INDEX: 31.5 KG/M2 | SYSTOLIC BLOOD PRESSURE: 123 MMHG | WEIGHT: 220 LBS

## 2018-12-07 PROCEDURE — 93285 PRGRMG DEV EVAL SCRMS IP: CPT

## 2018-12-11 ENCOUNTER — OTHER (OUTPATIENT)
Age: 48
End: 2018-12-11

## 2018-12-18 ENCOUNTER — FORM ENCOUNTER (OUTPATIENT)
Age: 48
End: 2018-12-18

## 2018-12-19 ENCOUNTER — APPOINTMENT (OUTPATIENT)
Dept: MRI IMAGING | Facility: CLINIC | Age: 48
End: 2018-12-19
Payer: COMMERCIAL

## 2018-12-19 ENCOUNTER — APPOINTMENT (OUTPATIENT)
Dept: CT IMAGING | Facility: CLINIC | Age: 48
End: 2018-12-19
Payer: COMMERCIAL

## 2018-12-19 ENCOUNTER — OUTPATIENT (OUTPATIENT)
Dept: OUTPATIENT SERVICES | Facility: HOSPITAL | Age: 48
LOS: 1 days | End: 2018-12-19
Payer: COMMERCIAL

## 2018-12-19 DIAGNOSIS — Z90.89 ACQUIRED ABSENCE OF OTHER ORGANS: Chronic | ICD-10-CM

## 2018-12-19 DIAGNOSIS — Z90.49 ACQUIRED ABSENCE OF OTHER SPECIFIED PARTS OF DIGESTIVE TRACT: Chronic | ICD-10-CM

## 2018-12-19 DIAGNOSIS — Z00.8 ENCOUNTER FOR OTHER GENERAL EXAMINATION: ICD-10-CM

## 2018-12-19 DIAGNOSIS — Z98.890 OTHER SPECIFIED POSTPROCEDURAL STATES: Chronic | ICD-10-CM

## 2018-12-19 PROCEDURE — 71275 CT ANGIOGRAPHY CHEST: CPT

## 2018-12-19 PROCEDURE — A9585: CPT

## 2018-12-19 PROCEDURE — 72198 MR ANGIO PELVIS W/O & W/DYE: CPT | Mod: 26

## 2018-12-19 PROCEDURE — C8920: CPT

## 2018-12-19 PROCEDURE — 71275 CT ANGIOGRAPHY CHEST: CPT | Mod: 26

## 2019-01-09 ENCOUNTER — APPOINTMENT (OUTPATIENT)
Dept: ELECTROPHYSIOLOGY | Facility: CLINIC | Age: 49
End: 2019-01-09
Payer: COMMERCIAL

## 2019-01-09 PROCEDURE — 93298 REM INTERROG DEV EVAL SCRMS: CPT

## 2019-01-09 PROCEDURE — 93299: CPT

## 2019-01-10 ENCOUNTER — RX RENEWAL (OUTPATIENT)
Age: 49
End: 2019-01-10

## 2019-02-11 ENCOUNTER — APPOINTMENT (OUTPATIENT)
Dept: ELECTROPHYSIOLOGY | Facility: CLINIC | Age: 49
End: 2019-02-11

## 2019-03-14 ENCOUNTER — APPOINTMENT (OUTPATIENT)
Dept: NEUROLOGY | Facility: CLINIC | Age: 49
End: 2019-03-14
Payer: COMMERCIAL

## 2019-03-14 ENCOUNTER — APPOINTMENT (OUTPATIENT)
Dept: ELECTROPHYSIOLOGY | Facility: HOSPITAL | Age: 49
End: 2019-03-14
Payer: COMMERCIAL

## 2019-03-14 ENCOUNTER — TRANSCRIPTION ENCOUNTER (OUTPATIENT)
Age: 49
End: 2019-03-14

## 2019-03-14 VITALS
BODY MASS INDEX: 29.49 KG/M2 | SYSTOLIC BLOOD PRESSURE: 129 MMHG | WEIGHT: 206 LBS | DIASTOLIC BLOOD PRESSURE: 82 MMHG | HEART RATE: 76 BPM | HEIGHT: 70 IN

## 2019-03-14 DIAGNOSIS — I63.311 CEREBRAL INFARCTION DUE TO THROMBOSIS OF RIGHT MIDDLE CEREBRAL ARTERY: ICD-10-CM

## 2019-03-14 PROCEDURE — 99215 OFFICE O/P EST HI 40 MIN: CPT

## 2019-03-14 PROCEDURE — 93892 TCD EMBOLI DETECT W/O INJ: CPT

## 2019-03-14 PROCEDURE — 93886 INTRACRANIAL COMPLETE STUDY: CPT

## 2019-03-14 PROCEDURE — 93298 REM INTERROG DEV EVAL SCRMS: CPT

## 2019-03-14 PROCEDURE — 93880 EXTRACRANIAL BILAT STUDY: CPT

## 2019-03-14 NOTE — ASSESSMENT
[FreeTextEntry1] : Assessment:\par 48 years old left-handed man with vascular risk factor of active smoking and HTN is seen in the vascular neurology office for the evaluation and management of symptoms concerning for stroke, leading to hospital admission in 1/18. On 1/22/18 he awoke with left facial sensory paresthesia and left arm clumsiness. Over the next few hours, he reports to have noticed fluctuating neurological symptoms. He was treated with IV tPA at OSH and subsequently transferred to [facility] for further management. CT brain (1/23/18) did not show any evidence of acute infarct or hemorrhage. CTA head and neck (1/23/18) did not show any symptomatic intracranial or extracranial cerebral large vessel severe stenosis or occlusion. MRI brain performed subsequently as outpatient is reported as "unremarkable MRI of the brain, no evidence of acute infarction". Transesophageal echocardiogram did not show any structural cardiac source of embolism nor showed any evidence of a PFO but was concerning for extracardiac/intra-pulmonary shunt. Lower extremity duplex did not show any evidence of DVT being paradoxical source of embolism. Hypercoagulable workup is unrevealing to my eye, except for heterozygous prothrombin gene mutation; which would be of doubtful clinical significance for arterial thromboembolism. In the evening of 6/14/18, he noticed acute onset of sensory paresthesia over the left face, which spread to left arm and leg followed by weakness/clumsiness, imbalance, and dysarthria, prompting his presentation to OSH. He posterior have complete resolution of neurological symptoms. Over 20-25 minutes. MRI brain (7/6/18) did not show any evidence of acute infarct or hemorrhage. MRA head and neck (7/6/18-7/12/18) did not show any significant intracranial or extracranial cerebral large vessel severe stenosis or occlusion nor showed any evidence of cervical arterial dissection. CT chest, abdomen, and pelvis did not show any evidence of malignancy. Serological tumor markers has also been unrevealing to my eye. Ambulatory prolonged EEG is reported as "normal EEG in awake, drowsy, and asleep states". 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 Of note, he reports to have history of 1 or 2 episodes of headaches with migrainous features in the past and also reports to have multiple episodes of ocular migraines in the past.\par \par Impression:\par Two distinct episodes of left-sided sensory paresthesia followed by weakness lasting for a few minutes to hours - the exact etiology of these episodes remains unclear but would likely be related to transient right brain (hemispheric versus brainstem) dysfunction - differential diagnoses include averted/thrombolyzed right MCA distribution stroke versus right MCA distribution TIA of presumed cryptogenic etiology (likely secondary to embolism from a proximal source like cardiac/paradoxical source of embolism) versus late life migraine accompaniment/migraine with aura\par \par Plan:\par - Aggrenox one capsule twice a day (suspect aspirin failure) for secondary stroke prevention, indefinitely if not contraindicated due to any specific reasons in the future. Risks versus benefits and adverse reactions including headaches associated with medication use were discussed with him in detail. He reports to have noticed headaches off and on since starting Aggrenox, which would be unusually related to Aggrenox use as he has been using it regularly more than a few months. Have advised him to contact me to consider switching from Aggrenox to clopidogrel in case of continued headaches\par - Rosuvastatin 10 mg at bedtime as per home medication; considering likely non-atheroembolic etiology of his symptoms further dose titration is deferred to his PCP/cardiologist\par - He should follow up closely with his primary care physician for optimal vascular risk factors modifications including blood pressure goal less than 130/80 mmHg, HbA1c goal <7 and preferably 6-6.5 and optimal cholesterol management \par - He is advised to check blood pressure regularly at home, preferably at different times during the day and multiple days a week and was advised to keep a log of BPs to bring to primary care physician visit for further instructions regarding optimal BP management. Also advised to followup closely with PCP regarding regular blood work including monitoring of HbA1c and cholesterol profile\par - Prolonged cardiac monitoring with ICM to screen for occult cardiac arrhythmias like atrial fibrillation being the cardiac source of embolism, as it could potentially change the measures of secondary stroke prevention. Advised to consult with his cardiologist for ICM monitoring. Possibility of starting therapeutic anticoagulation is also discussed with him in detail, if he is diagnosed to have A. Fib in the future\par \par - Advised to follow with hematologist as scheduled\par \par - Follow up with pulmonologist for evaluation and management of (borderline) sleep apnea\par \par - MRV pelvis did not show any evidence of pelvic vein thrombosis \par \par - Advised to followup with his PCP regarding age-appropriate malignancy screening tests\par \par \par - Above mentioned plan was discussed with patient in detail. I have answered all his questions to the best of my abilities. I have reviewed measures for secondary stroke prevention with the patient, including aggressive vascular risk factors modification, healthy diet, regular exercise and medication compliance. As well as discussed the need for calling 911 immediately in case of any symptoms concerning for stroke in the future. \par - I would follow him in the office in about 6 months or earlier as needed. Advised to contact me upon completion of radiological studies and/or laboratory testing to discuss the results over the phone.

## 2019-03-14 NOTE — PHYSICAL EXAM
[FreeTextEntry1] : General exam: Sitting in the chair and does not appear to be in distress\par Carotid bruits: None\par CVS: S1-S2 present\par RS: CTA B\par Skin: No lesion noted on visible skin \par \par Neuro exam: \par MS: Alert, awake and is oriented to time, place and person with normal attention span, normal recent and remote memory\par Language: Fluent speech with intact comprehension, with intact naming and repetition, no right-left confusion, no finger agnosia and no apraxia. Normal fund of knowledge. No dysarthria. \par Cr.N.: Pupils bilaterally 3-4 mm in size, equal, round and reactive to light, no papilledema, intact visual fields to confrontation, extraocular movements intact without any diplopia, no ptosis, no nystagmus, face appears to be symmetric with intact facial sensations, no hearing loss to rubbing fingers, tongue is in the midline, uvula elevates in the midline and without any drooping of the soft palate, normal shrugging of the shoulders bilaterally.\par \par Motor: \par Tone - Normal\par Bulk - No atrophy\par Power - 5/5 all over without any pronator drift\par DTRs - +2 all over and bilaterally symmetric\par Plantars: Bilaterally flexor\par Sensory: Intact to light touch and pinprick, no sensory extinction. \par Cerebellar: No ataxia on finger-nose-finger and knee-heel-shin testing, as well as without any dysdiadochokinesia\par Gait: Normal casual gait with normal stride and length and was able to perform toe, heel and tandem walking\par Romberg's sign - Absent.

## 2019-03-14 NOTE — HISTORY OF PRESENT ILLNESS
[FreeTextEntry1] : 48 years old left-handed man with vascular risk factor of active smoking and HTN is seen in the vascular neurology office for the evaluation and management of symptoms concerning for stroke, leading to hospital admission in 1/18.\par  \par On 1/22 at around 4 AM, he awoke with left facial sensory paresthesia in the form of tingling/numbness and left upper extremity clumsiness. He did not seek any immediate medical attention but went back to sleep and upon waking up at 6 AM he reports complete resolution of his neurological symptoms. At around 10 AM at his work, he noticed acute onset of left arm weakness/clumsiness, dysarthria and left facial droop. He presented to [facility] for further evaluation and reports to have noticed complete resolution of his focal neurological symptoms in the [facility] ED by around 11:15-11:20 AM. Soon after that he reports to have noticed reappearance of his neurological symptoms and was subsequently treated with IV tPA. He was transferred to [facility] for further evaluation. He denies taking any antiplatelet medications before his hospital admission. He denies any family history of stroke at young age. He denies any personal or family history of DVT/PEs. Of note, he reports to be snoring at night in the past but not recently. He denies any witnessed apneic spells. \par \par \par \par \par In the evening of 6/14/18, he started tingling and numbness over the left face followed by left arm and leg weakness/clumsiness along with imbalance and slurring of the speech. Within next 2-3 minutes, he reports complete left hemiplegia and severe dysarthria. He also reports to have noticed lightheadedness and presyncope without syncope and nausea without any vomiting at that time. He was brought to [facility] ED, he continued to have left sided weakness. His symptoms lasted for about 20-25 minutes with complete resolution of the neurological deficits. \par \par He reports to have history of headaches without migrainous features in the past - about 1-2 times in his life. \par \par He has been taking aspirin 81 mg OD since his stroke in 1/18. He was discharged on aspirin and clopidogrel for stroke prevention in 6/18. \par \par Currently he denies any focal neurological symptoms. He reports feeling back to normal with current post-stroke mRS being 0. He denies any episodes of focal neurological symptoms concerning for stroke or TIA since his last office visit. Reports compliance with his medications and denies any significant side effects. He is being followed by [facility] cardiology for ICM monitoring. \par \par \par ROS: All negative except documented in the history of present illness.\par \par Workup:\par CT brain (1/23/18): No evidence of acute infarct or hemorrhage\par CTA head and neck (1/23/18): No evidence of significant intracranial or extracranial large vessel severe stenosis or occlusion\par Lower extremity duplex: No evidence of DVT\par MRI brain (1/26/18) - only report available: "Unremarkable MRI of the brain without any evidence of acute infarct".\par LDL: 119 (1/18)\par HbA1c: 5.2 (1/18)\par Transthoracic echocardiogram: Normal mitral low, mild mitral regurgitation, normal left atrium, normal left systolic function, no segmental wall motion abnormalities, agitated saline injection demonstrates no evidence of a PFO\par Transesophageal echocardiogram: Minimal mitral regurgitation, minimal aortic regurgitation, the left atrial or left atrial appendage thrombus, normal less than 2 systolic function, no segmental wall motion abnormalities, agitated saline injection revealed late bubbles in the left. Heart, consistent with transpulmonic shunting\par Hypercoagulable workup: Unrevealing except heterozygous for prothrombin gene mutation\par \par Home medications:\par Reviewed with the patient and updated as appropriate. \par \par CT brain (6/15/18): No evidence of acute infarct or hemorrhage \par

## 2019-04-15 ENCOUNTER — APPOINTMENT (OUTPATIENT)
Dept: ELECTROPHYSIOLOGY | Facility: HOSPITAL | Age: 49
End: 2019-04-15
Payer: COMMERCIAL

## 2019-04-15 PROCEDURE — 93298 REM INTERROG DEV EVAL SCRMS: CPT

## 2019-04-15 PROCEDURE — 93299: CPT

## 2019-05-16 ENCOUNTER — APPOINTMENT (OUTPATIENT)
Dept: ELECTROPHYSIOLOGY | Facility: HOSPITAL | Age: 49
End: 2019-05-16
Payer: COMMERCIAL

## 2019-05-16 PROCEDURE — 93298 REM INTERROG DEV EVAL SCRMS: CPT

## 2019-05-16 PROCEDURE — 93299: CPT

## 2019-06-14 ENCOUNTER — APPOINTMENT (OUTPATIENT)
Dept: ELECTROPHYSIOLOGY | Facility: CLINIC | Age: 49
End: 2019-06-14
Payer: COMMERCIAL

## 2019-06-14 ENCOUNTER — APPOINTMENT (OUTPATIENT)
Dept: ELECTROPHYSIOLOGY | Facility: CLINIC | Age: 49
End: 2019-06-14

## 2019-06-14 VITALS
OXYGEN SATURATION: 98 % | HEART RATE: 82 BPM | HEIGHT: 70 IN | DIASTOLIC BLOOD PRESSURE: 77 MMHG | BODY MASS INDEX: 27.92 KG/M2 | WEIGHT: 195 LBS | SYSTOLIC BLOOD PRESSURE: 125 MMHG

## 2019-06-14 PROCEDURE — 93291 INTERROG DEV EVAL SCRMS IP: CPT

## 2019-06-17 ENCOUNTER — APPOINTMENT (OUTPATIENT)
Dept: ELECTROPHYSIOLOGY | Facility: CLINIC | Age: 49
End: 2019-06-17
Payer: COMMERCIAL

## 2019-06-17 PROCEDURE — 93299: CPT

## 2019-06-17 PROCEDURE — 93298 REM INTERROG DEV EVAL SCRMS: CPT

## 2019-07-18 ENCOUNTER — APPOINTMENT (OUTPATIENT)
Dept: ELECTROPHYSIOLOGY | Facility: CLINIC | Age: 49
End: 2019-07-18
Payer: COMMERCIAL

## 2019-07-18 PROCEDURE — 93298 REM INTERROG DEV EVAL SCRMS: CPT

## 2019-07-18 PROCEDURE — 93299: CPT

## 2019-08-20 ENCOUNTER — APPOINTMENT (OUTPATIENT)
Dept: ELECTROPHYSIOLOGY | Facility: CLINIC | Age: 49
End: 2019-08-20
Payer: COMMERCIAL

## 2019-08-20 PROCEDURE — 93299: CPT

## 2019-08-20 PROCEDURE — 93298 REM INTERROG DEV EVAL SCRMS: CPT

## 2019-09-20 ENCOUNTER — APPOINTMENT (OUTPATIENT)
Dept: ELECTROPHYSIOLOGY | Facility: CLINIC | Age: 49
End: 2019-09-20
Payer: COMMERCIAL

## 2019-09-20 PROCEDURE — 93298 REM INTERROG DEV EVAL SCRMS: CPT

## 2019-09-20 PROCEDURE — 93299: CPT

## 2019-09-23 ENCOUNTER — APPOINTMENT (OUTPATIENT)
Dept: NEUROLOGY | Facility: CLINIC | Age: 49
End: 2019-09-23

## 2019-10-14 ENCOUNTER — APPOINTMENT (OUTPATIENT)
Dept: NEUROLOGY | Facility: CLINIC | Age: 49
End: 2019-10-14

## 2019-10-23 ENCOUNTER — APPOINTMENT (OUTPATIENT)
Dept: ELECTROPHYSIOLOGY | Facility: CLINIC | Age: 49
End: 2019-10-23
Payer: COMMERCIAL

## 2019-10-23 PROCEDURE — 93298 REM INTERROG DEV EVAL SCRMS: CPT

## 2019-10-23 PROCEDURE — 93299: CPT

## 2019-11-25 ENCOUNTER — APPOINTMENT (OUTPATIENT)
Dept: ELECTROPHYSIOLOGY | Facility: CLINIC | Age: 49
End: 2019-11-25
Payer: COMMERCIAL

## 2019-11-25 PROCEDURE — 93298 REM INTERROG DEV EVAL SCRMS: CPT

## 2019-11-25 PROCEDURE — 93299: CPT

## 2019-12-16 ENCOUNTER — EMERGENCY (EMERGENCY)
Facility: HOSPITAL | Age: 49
LOS: 0 days | Discharge: HOME | End: 2019-12-16
Attending: EMERGENCY MEDICINE
Payer: COMMERCIAL

## 2019-12-16 VITALS
DIASTOLIC BLOOD PRESSURE: 84 MMHG | TEMPERATURE: 98 F | SYSTOLIC BLOOD PRESSURE: 132 MMHG | OXYGEN SATURATION: 100 % | HEART RATE: 76 BPM | RESPIRATION RATE: 18 BRPM

## 2019-12-16 VITALS — WEIGHT: 184.97 LBS

## 2019-12-16 DIAGNOSIS — Z90.49 ACQUIRED ABSENCE OF OTHER SPECIFIED PARTS OF DIGESTIVE TRACT: Chronic | ICD-10-CM

## 2019-12-16 DIAGNOSIS — Z86.73 PERSONAL HISTORY OF TRANSIENT ISCHEMIC ATTACK (TIA), AND CEREBRAL INFARCTION WITHOUT RESIDUAL DEFICITS: ICD-10-CM

## 2019-12-16 DIAGNOSIS — Z87.442 PERSONAL HISTORY OF URINARY CALCULI: ICD-10-CM

## 2019-12-16 DIAGNOSIS — Z88.0 ALLERGY STATUS TO PENICILLIN: ICD-10-CM

## 2019-12-16 DIAGNOSIS — E78.5 HYPERLIPIDEMIA, UNSPECIFIED: ICD-10-CM

## 2019-12-16 DIAGNOSIS — Z98.890 OTHER SPECIFIED POSTPROCEDURAL STATES: Chronic | ICD-10-CM

## 2019-12-16 DIAGNOSIS — R10.9 UNSPECIFIED ABDOMINAL PAIN: ICD-10-CM

## 2019-12-16 DIAGNOSIS — R11.0 NAUSEA: ICD-10-CM

## 2019-12-16 DIAGNOSIS — F17.200 NICOTINE DEPENDENCE, UNSPECIFIED, UNCOMPLICATED: ICD-10-CM

## 2019-12-16 DIAGNOSIS — I10 ESSENTIAL (PRIMARY) HYPERTENSION: ICD-10-CM

## 2019-12-16 DIAGNOSIS — Z90.89 ACQUIRED ABSENCE OF OTHER ORGANS: Chronic | ICD-10-CM

## 2019-12-16 DIAGNOSIS — M54.6 PAIN IN THORACIC SPINE: ICD-10-CM

## 2019-12-16 LAB
ALBUMIN SERPL ELPH-MCNC: 4.3 G/DL — SIGNIFICANT CHANGE UP (ref 3.3–5)
ALP SERPL-CCNC: 85 U/L — SIGNIFICANT CHANGE UP (ref 40–120)
ALT FLD-CCNC: 28 U/L — SIGNIFICANT CHANGE UP (ref 12–78)
ANION GAP SERPL CALC-SCNC: 6 MMOL/L — SIGNIFICANT CHANGE UP (ref 5–17)
APPEARANCE UR: CLEAR — SIGNIFICANT CHANGE UP
AST SERPL-CCNC: 17 U/L — SIGNIFICANT CHANGE UP (ref 15–37)
BASOPHILS # BLD AUTO: 0.03 K/UL — SIGNIFICANT CHANGE UP (ref 0–0.2)
BASOPHILS NFR BLD AUTO: 0.4 % — SIGNIFICANT CHANGE UP (ref 0–2)
BILIRUB SERPL-MCNC: 0.3 MG/DL — SIGNIFICANT CHANGE UP (ref 0.2–1.2)
BILIRUB UR-MCNC: NEGATIVE — SIGNIFICANT CHANGE UP
BUN SERPL-MCNC: 16 MG/DL — SIGNIFICANT CHANGE UP (ref 7–23)
CALCIUM SERPL-MCNC: 9.5 MG/DL — SIGNIFICANT CHANGE UP (ref 8.5–10.1)
CHLORIDE SERPL-SCNC: 106 MMOL/L — SIGNIFICANT CHANGE UP (ref 96–108)
CO2 SERPL-SCNC: 28 MMOL/L — SIGNIFICANT CHANGE UP (ref 22–31)
COLOR SPEC: YELLOW — SIGNIFICANT CHANGE UP
CREAT SERPL-MCNC: 0.87 MG/DL — SIGNIFICANT CHANGE UP (ref 0.5–1.3)
DIFF PNL FLD: ABNORMAL
EOSINOPHIL # BLD AUTO: 0.31 K/UL — SIGNIFICANT CHANGE UP (ref 0–0.5)
EOSINOPHIL NFR BLD AUTO: 3.9 % — SIGNIFICANT CHANGE UP (ref 0–6)
GLUCOSE SERPL-MCNC: 76 MG/DL — SIGNIFICANT CHANGE UP (ref 70–99)
GLUCOSE UR QL: NEGATIVE MG/DL — SIGNIFICANT CHANGE UP
HCT VFR BLD CALC: 47.7 % — SIGNIFICANT CHANGE UP (ref 39–50)
HGB BLD-MCNC: 16.2 G/DL — SIGNIFICANT CHANGE UP (ref 13–17)
IMM GRANULOCYTES NFR BLD AUTO: 0.4 % — SIGNIFICANT CHANGE UP (ref 0–1.5)
KETONES UR-MCNC: NEGATIVE — SIGNIFICANT CHANGE UP
LEUKOCYTE ESTERASE UR-ACNC: NEGATIVE — SIGNIFICANT CHANGE UP
LIDOCAIN IGE QN: 174 U/L — SIGNIFICANT CHANGE UP (ref 73–393)
LYMPHOCYTES # BLD AUTO: 2.16 K/UL — SIGNIFICANT CHANGE UP (ref 1–3.3)
LYMPHOCYTES # BLD AUTO: 27.3 % — SIGNIFICANT CHANGE UP (ref 13–44)
MCHC RBC-ENTMCNC: 30.5 PG — SIGNIFICANT CHANGE UP (ref 27–34)
MCHC RBC-ENTMCNC: 34 GM/DL — SIGNIFICANT CHANGE UP (ref 32–36)
MCV RBC AUTO: 89.8 FL — SIGNIFICANT CHANGE UP (ref 80–100)
MONOCYTES # BLD AUTO: 0.75 K/UL — SIGNIFICANT CHANGE UP (ref 0–0.9)
MONOCYTES NFR BLD AUTO: 9.5 % — SIGNIFICANT CHANGE UP (ref 2–14)
NEUTROPHILS # BLD AUTO: 4.64 K/UL — SIGNIFICANT CHANGE UP (ref 1.8–7.4)
NEUTROPHILS NFR BLD AUTO: 58.5 % — SIGNIFICANT CHANGE UP (ref 43–77)
NITRITE UR-MCNC: NEGATIVE — SIGNIFICANT CHANGE UP
PH UR: 6 — SIGNIFICANT CHANGE UP (ref 5–8)
PLATELET # BLD AUTO: 236 K/UL — SIGNIFICANT CHANGE UP (ref 150–400)
POTASSIUM SERPL-MCNC: 4.2 MMOL/L — SIGNIFICANT CHANGE UP (ref 3.5–5.3)
POTASSIUM SERPL-SCNC: 4.2 MMOL/L — SIGNIFICANT CHANGE UP (ref 3.5–5.3)
PROT SERPL-MCNC: 7.4 GM/DL — SIGNIFICANT CHANGE UP (ref 6–8.3)
PROT UR-MCNC: NEGATIVE MG/DL — SIGNIFICANT CHANGE UP
RBC # BLD: 5.31 M/UL — SIGNIFICANT CHANGE UP (ref 4.2–5.8)
RBC # FLD: 13.1 % — SIGNIFICANT CHANGE UP (ref 10.3–14.5)
SODIUM SERPL-SCNC: 140 MMOL/L — SIGNIFICANT CHANGE UP (ref 135–145)
SP GR SPEC: 1.01 — SIGNIFICANT CHANGE UP (ref 1.01–1.02)
UROBILINOGEN FLD QL: NEGATIVE MG/DL — SIGNIFICANT CHANGE UP
WBC # BLD: 7.92 K/UL — SIGNIFICANT CHANGE UP (ref 3.8–10.5)
WBC # FLD AUTO: 7.92 K/UL — SIGNIFICANT CHANGE UP (ref 3.8–10.5)

## 2019-12-16 PROCEDURE — 83690 ASSAY OF LIPASE: CPT

## 2019-12-16 PROCEDURE — 99284 EMERGENCY DEPT VISIT MOD MDM: CPT | Mod: 25

## 2019-12-16 PROCEDURE — 74176 CT ABD & PELVIS W/O CONTRAST: CPT | Mod: 26

## 2019-12-16 PROCEDURE — 80053 COMPREHEN METABOLIC PANEL: CPT

## 2019-12-16 PROCEDURE — 87086 URINE CULTURE/COLONY COUNT: CPT

## 2019-12-16 PROCEDURE — 99284 EMERGENCY DEPT VISIT MOD MDM: CPT

## 2019-12-16 PROCEDURE — 74176 CT ABD & PELVIS W/O CONTRAST: CPT

## 2019-12-16 PROCEDURE — 96375 TX/PRO/DX INJ NEW DRUG ADDON: CPT

## 2019-12-16 PROCEDURE — 85025 COMPLETE CBC W/AUTO DIFF WBC: CPT

## 2019-12-16 PROCEDURE — 36415 COLL VENOUS BLD VENIPUNCTURE: CPT

## 2019-12-16 PROCEDURE — 81001 URINALYSIS AUTO W/SCOPE: CPT

## 2019-12-16 PROCEDURE — 96374 THER/PROPH/DIAG INJ IV PUSH: CPT

## 2019-12-16 RX ORDER — KETOROLAC TROMETHAMINE 30 MG/ML
30 SYRINGE (ML) INJECTION ONCE
Refills: 0 | Status: DISCONTINUED | OUTPATIENT
Start: 2019-12-16 | End: 2019-12-16

## 2019-12-16 RX ORDER — CYCLOBENZAPRINE HYDROCHLORIDE 10 MG/1
1 TABLET, FILM COATED ORAL
Qty: 15 | Refills: 0
Start: 2019-12-16 | End: 2019-12-20

## 2019-12-16 RX ORDER — ONDANSETRON 8 MG/1
4 TABLET, FILM COATED ORAL ONCE
Refills: 0 | Status: COMPLETED | OUTPATIENT
Start: 2019-12-16 | End: 2019-12-16

## 2019-12-16 RX ORDER — SODIUM CHLORIDE 9 MG/ML
1000 INJECTION INTRAMUSCULAR; INTRAVENOUS; SUBCUTANEOUS ONCE
Refills: 0 | Status: COMPLETED | OUTPATIENT
Start: 2019-12-16 | End: 2019-12-16

## 2019-12-16 RX ADMIN — ONDANSETRON 4 MILLIGRAM(S): 8 TABLET, FILM COATED ORAL at 11:41

## 2019-12-16 RX ADMIN — Medication 30 MILLIGRAM(S): at 11:41

## 2019-12-16 RX ADMIN — SODIUM CHLORIDE 2000 MILLILITER(S): 9 INJECTION INTRAMUSCULAR; INTRAVENOUS; SUBCUTANEOUS at 10:32

## 2019-12-16 NOTE — ED STATDOCS - OBJECTIVE STATEMENT
48 y/o male with a PMHx of cardiac loop, CVA, HLD, HTN, TIA, kidney stone presents to the ED c/o R flank pain. Pt woke up at 3 am feeling like he couldn't get comfortable but then when he woke up this morning he was nauseous with flank pain. Pt has had kidney stones in the past and had to get a stent. Last imaging was a couple years ago. No hematuria but some dysuria.

## 2019-12-16 NOTE — ED STATDOCS - PATIENT PORTAL LINK FT
You can access the FollowMyHealth Patient Portal offered by [facility] by registering at the following website: [email or web address]. By joining [employer]’s FollowMyHealth portal, you will also be able to view your health information using other applications (apps) compatible with our system.

## 2019-12-16 NOTE — ED STATDOCS - PROGRESS NOTE DETAILS
On re-eval, Pt reports pain is present.  No vomiting in the ED.  Labs WNL. UA with trace blood.  CT without evidence of Kidney Stones, hydronephrosis.   Appendix not visulaized, s/p (appY).  On exam, Neg rash.  CTA B.  Tender R LE muscles to palp.  NT spine to palp.  Decreased AROM with flexion, turns to R side.  Likely musculoskeletal pain.  Toradol will be given.  Likely dc home with Nsaids, muscle relaxer.  Kristin Blankenship PA-C

## 2019-12-16 NOTE — ED ADULT TRIAGE NOTE - CHIEF COMPLAINT QUOTE
Pt c/o right flank pain sicne last night, with nausea and inability to get comfortable. pt denies urinary symptoms, fall trauma fever. pt states he has hx of kdiney stone and states this pain feels similar.

## 2019-12-16 NOTE — ED STATDOCS - ATTENDING CONTRIBUTION TO CARE
I, Elina Leblanc MD,  performed the initial face to face bedside interview with this patient regarding history of present illness, review of symptoms and relevant past medical, social and family history.  I completed an independent physical examination.  I was the initial provider who evaluated this patient. I have signed out the follow up of any pending tests (i.e. labs, radiological studies) to the ACP.  I have communicated the patient’s plan of care and disposition with the ACP.  The history, relevant review of systems, past medical and surgical history, medical decision making, and physical examination was documented by the scribe in my presence and I attest to the accuracy of the documentation.

## 2019-12-16 NOTE — ED ADULT NURSE NOTE - OBJECTIVE STATEMENT
Patient presents to ED complaining of flank pain. Patient complaining of R flank pain and nausea since 3 am. Patient complaining of discomfort with urination. Patient states he has had kidney stones in past with similar symptoms. Patient denies V/D, SOB, CP, hematuria

## 2019-12-16 NOTE — ED STATDOCS - CARE PROVIDER_API CALL
Ruy Stokes)  Internal Medicine  [address], [address]  [place] [address]  Phone: [phone]  Fax: [phone]  Follow Up Time:

## 2019-12-17 LAB
CULTURE RESULTS: NO GROWTH — SIGNIFICANT CHANGE UP
SPECIMEN SOURCE: SIGNIFICANT CHANGE UP

## 2019-12-26 ENCOUNTER — APPOINTMENT (OUTPATIENT)
Dept: ELECTROPHYSIOLOGY | Facility: CLINIC | Age: 49
End: 2019-12-26
Payer: COMMERCIAL

## 2019-12-26 PROCEDURE — 93298 REM INTERROG DEV EVAL SCRMS: CPT

## 2019-12-26 PROCEDURE — 93299: CPT

## 2020-01-24 ENCOUNTER — APPOINTMENT (OUTPATIENT)
Dept: ELECTROPHYSIOLOGY | Facility: CLINIC | Age: 50
End: 2020-01-24
Payer: COMMERCIAL

## 2020-01-24 PROCEDURE — 93298 REM INTERROG DEV EVAL SCRMS: CPT

## 2020-01-24 PROCEDURE — G2066: CPT

## 2020-02-24 ENCOUNTER — APPOINTMENT (OUTPATIENT)
Dept: ELECTROPHYSIOLOGY | Facility: CLINIC | Age: 50
End: 2020-02-24
Payer: COMMERCIAL

## 2020-02-24 PROCEDURE — 93298 REM INTERROG DEV EVAL SCRMS: CPT

## 2020-02-24 PROCEDURE — G2066: CPT

## 2020-03-16 ENCOUNTER — APPOINTMENT (OUTPATIENT)
Dept: NEUROLOGY | Facility: CLINIC | Age: 50
End: 2020-03-16

## 2020-03-26 ENCOUNTER — APPOINTMENT (OUTPATIENT)
Dept: ELECTROPHYSIOLOGY | Facility: CLINIC | Age: 50
End: 2020-03-26
Payer: COMMERCIAL

## 2020-03-26 PROCEDURE — G2066: CPT

## 2020-03-26 PROCEDURE — 93298 REM INTERROG DEV EVAL SCRMS: CPT

## 2020-04-27 ENCOUNTER — APPOINTMENT (OUTPATIENT)
Dept: ELECTROPHYSIOLOGY | Facility: CLINIC | Age: 50
End: 2020-04-27
Payer: COMMERCIAL

## 2020-04-27 PROCEDURE — 93298 REM INTERROG DEV EVAL SCRMS: CPT

## 2020-04-27 PROCEDURE — G2066: CPT

## 2020-05-28 ENCOUNTER — APPOINTMENT (OUTPATIENT)
Dept: ELECTROPHYSIOLOGY | Facility: CLINIC | Age: 50
End: 2020-05-28
Payer: COMMERCIAL

## 2020-05-28 PROCEDURE — G2066: CPT

## 2020-05-28 PROCEDURE — 93298 REM INTERROG DEV EVAL SCRMS: CPT

## 2020-06-29 ENCOUNTER — APPOINTMENT (OUTPATIENT)
Dept: ELECTROPHYSIOLOGY | Facility: CLINIC | Age: 50
End: 2020-06-29
Payer: COMMERCIAL

## 2020-06-29 PROCEDURE — 93298 REM INTERROG DEV EVAL SCRMS: CPT

## 2020-06-29 PROCEDURE — G2066: CPT

## 2020-07-16 NOTE — ED PROVIDER NOTE - NSCAREINITIATED _GEN_ER
Urothelial cancer follow up with cystoscopy- patient of Dr. King.  Records available in [facility].  
Aure López(Attending)

## 2020-07-22 ENCOUNTER — APPOINTMENT (OUTPATIENT)
Dept: NEUROLOGY | Facility: CLINIC | Age: 50
End: 2020-07-22
Payer: COMMERCIAL

## 2020-07-22 PROCEDURE — 93886 INTRACRANIAL COMPLETE STUDY: CPT

## 2020-07-22 PROCEDURE — 93893 TCD STD ICR ART VEN-ART SHNT: CPT

## 2020-07-22 PROCEDURE — 93880 EXTRACRANIAL BILAT STUDY: CPT

## 2020-07-30 ENCOUNTER — APPOINTMENT (OUTPATIENT)
Dept: ELECTROPHYSIOLOGY | Facility: CLINIC | Age: 50
End: 2020-07-30
Payer: COMMERCIAL

## 2020-07-30 PROCEDURE — G2066: CPT

## 2020-07-30 PROCEDURE — 93298 REM INTERROG DEV EVAL SCRMS: CPT

## 2020-08-14 ENCOUNTER — APPOINTMENT (OUTPATIENT)
Dept: NEUROLOGY | Facility: CLINIC | Age: 50
End: 2020-08-14

## 2020-08-31 ENCOUNTER — APPOINTMENT (OUTPATIENT)
Dept: ELECTROPHYSIOLOGY | Facility: CLINIC | Age: 50
End: 2020-08-31
Payer: COMMERCIAL

## 2020-08-31 PROCEDURE — 93298 REM INTERROG DEV EVAL SCRMS: CPT

## 2020-08-31 PROCEDURE — G2066: CPT

## 2020-10-05 ENCOUNTER — APPOINTMENT (OUTPATIENT)
Dept: ELECTROPHYSIOLOGY | Facility: CLINIC | Age: 50
End: 2020-10-05
Payer: COMMERCIAL

## 2020-10-05 PROCEDURE — G2066: CPT

## 2020-10-05 PROCEDURE — 93298 REM INTERROG DEV EVAL SCRMS: CPT

## 2020-11-09 ENCOUNTER — APPOINTMENT (OUTPATIENT)
Dept: ELECTROPHYSIOLOGY | Facility: CLINIC | Age: 50
End: 2020-11-09
Payer: COMMERCIAL

## 2020-11-09 PROCEDURE — G2066: CPT

## 2020-11-09 PROCEDURE — 93298 REM INTERROG DEV EVAL SCRMS: CPT

## 2020-11-10 ENCOUNTER — NON-APPOINTMENT (OUTPATIENT)
Age: 50
End: 2020-11-10

## 2020-11-30 ENCOUNTER — TRANSCRIPTION ENCOUNTER (OUTPATIENT)
Age: 50
End: 2020-11-30

## 2021-01-07 ENCOUNTER — TRANSCRIPTION ENCOUNTER (OUTPATIENT)
Age: 51
End: 2021-01-07

## 2021-01-19 ENCOUNTER — APPOINTMENT (OUTPATIENT)
Dept: ELECTROPHYSIOLOGY | Facility: CLINIC | Age: 51
End: 2021-01-19
Payer: COMMERCIAL

## 2021-01-19 PROCEDURE — 93298 REM INTERROG DEV EVAL SCRMS: CPT

## 2021-01-19 PROCEDURE — G2066: CPT

## 2021-01-20 ENCOUNTER — RX RENEWAL (OUTPATIENT)
Age: 51
End: 2021-01-20

## 2021-01-29 ENCOUNTER — NON-APPOINTMENT (OUTPATIENT)
Age: 51
End: 2021-01-29

## 2021-02-04 ENCOUNTER — TRANSCRIPTION ENCOUNTER (OUTPATIENT)
Age: 51
End: 2021-02-04

## 2021-02-22 ENCOUNTER — NON-APPOINTMENT (OUTPATIENT)
Age: 51
End: 2021-02-22

## 2021-02-22 ENCOUNTER — APPOINTMENT (OUTPATIENT)
Dept: ELECTROPHYSIOLOGY | Facility: CLINIC | Age: 51
End: 2021-02-22
Payer: COMMERCIAL

## 2021-02-22 PROCEDURE — G2066: CPT

## 2021-02-22 PROCEDURE — 93298 REM INTERROG DEV EVAL SCRMS: CPT

## 2021-03-29 ENCOUNTER — NON-APPOINTMENT (OUTPATIENT)
Age: 51
End: 2021-03-29

## 2021-03-29 ENCOUNTER — APPOINTMENT (OUTPATIENT)
Dept: ELECTROPHYSIOLOGY | Facility: CLINIC | Age: 51
End: 2021-03-29
Payer: COMMERCIAL

## 2021-03-29 PROCEDURE — G2066: CPT

## 2021-03-29 PROCEDURE — 93298 REM INTERROG DEV EVAL SCRMS: CPT

## 2021-04-05 NOTE — ED STATDOCS - CPE ED EYE NORM
Ms. Merchant's here for follow-up.  She's been having breakthrough reflux symptoms nausea and vomiting in spite of taking omeprazole.  Symptoms are worse with red sauces.  She says that's what bothers her the most.  She is on omeprazole as above and that helps her nausea and vomiting but she still has breakthrough reflux.  She does not smoke or drink.  She does however take meloxicam.  There is no dysphagia or odynophagia melena or hematemesis.She has drug-induced constipation.  She has chronic pain issues and does take morphine.  There is no blood in the bowels.  She takes a stool softener.  There is no family history of polyps colitis or colon cancer.  I did a colonoscopy on her in 2015 but she had a suboptimal prep so she needs a screening exam.  She is in no distress.  She does not look acutely ill.
bilateral normal...

## 2021-05-03 ENCOUNTER — APPOINTMENT (OUTPATIENT)
Dept: ELECTROPHYSIOLOGY | Facility: CLINIC | Age: 51
End: 2021-05-03
Payer: COMMERCIAL

## 2021-05-03 ENCOUNTER — NON-APPOINTMENT (OUTPATIENT)
Age: 51
End: 2021-05-03

## 2021-05-03 PROCEDURE — G2066: CPT

## 2021-05-03 PROCEDURE — 93298 REM INTERROG DEV EVAL SCRMS: CPT

## 2021-06-07 ENCOUNTER — NON-APPOINTMENT (OUTPATIENT)
Age: 51
End: 2021-06-07

## 2021-06-07 ENCOUNTER — APPOINTMENT (OUTPATIENT)
Dept: ELECTROPHYSIOLOGY | Facility: CLINIC | Age: 51
End: 2021-06-07
Payer: COMMERCIAL

## 2021-06-07 PROCEDURE — G2066: CPT

## 2021-06-07 PROCEDURE — 93298 REM INTERROG DEV EVAL SCRMS: CPT

## 2021-07-12 ENCOUNTER — APPOINTMENT (OUTPATIENT)
Dept: ELECTROPHYSIOLOGY | Facility: CLINIC | Age: 51
End: 2021-07-12
Payer: COMMERCIAL

## 2021-07-12 ENCOUNTER — NON-APPOINTMENT (OUTPATIENT)
Age: 51
End: 2021-07-12

## 2021-07-12 PROCEDURE — G2066: CPT

## 2021-07-12 PROCEDURE — 93298 REM INTERROG DEV EVAL SCRMS: CPT

## 2021-07-27 NOTE — DISCHARGE NOTE ADULT - NS MD DC FALL RISK RISK
Pulse Duration: 40 ms For information on Fall & Injury Prevention, visit [email or web address]

## 2021-08-16 ENCOUNTER — APPOINTMENT (OUTPATIENT)
Dept: ELECTROPHYSIOLOGY | Facility: CLINIC | Age: 51
End: 2021-08-16
Payer: COMMERCIAL

## 2021-08-16 ENCOUNTER — NON-APPOINTMENT (OUTPATIENT)
Age: 51
End: 2021-08-16

## 2021-08-16 PROCEDURE — G2066: CPT

## 2021-08-16 PROCEDURE — 93298 REM INTERROG DEV EVAL SCRMS: CPT

## 2021-09-20 ENCOUNTER — APPOINTMENT (OUTPATIENT)
Dept: ELECTROPHYSIOLOGY | Facility: CLINIC | Age: 51
End: 2021-09-20
Payer: COMMERCIAL

## 2021-09-20 ENCOUNTER — NON-APPOINTMENT (OUTPATIENT)
Age: 51
End: 2021-09-20

## 2021-09-20 PROCEDURE — G2066: CPT

## 2021-09-20 PROCEDURE — 93298 REM INTERROG DEV EVAL SCRMS: CPT

## 2021-10-25 ENCOUNTER — NON-APPOINTMENT (OUTPATIENT)
Age: 51
End: 2021-10-25

## 2021-10-25 ENCOUNTER — APPOINTMENT (OUTPATIENT)
Dept: ELECTROPHYSIOLOGY | Facility: CLINIC | Age: 51
End: 2021-10-25
Payer: COMMERCIAL

## 2021-10-25 PROCEDURE — G2066: CPT

## 2021-10-25 PROCEDURE — 93298 REM INTERROG DEV EVAL SCRMS: CPT | Mod: NC

## 2021-11-29 ENCOUNTER — APPOINTMENT (OUTPATIENT)
Dept: ELECTROPHYSIOLOGY | Facility: CLINIC | Age: 51
End: 2021-11-29
Payer: COMMERCIAL

## 2021-11-29 ENCOUNTER — NON-APPOINTMENT (OUTPATIENT)
Age: 51
End: 2021-11-29

## 2021-11-29 PROCEDURE — 93298 REM INTERROG DEV EVAL SCRMS: CPT

## 2021-11-29 PROCEDURE — G2066: CPT

## 2021-12-22 ENCOUNTER — TRANSCRIPTION ENCOUNTER (OUTPATIENT)
Age: 51
End: 2021-12-22

## 2022-01-03 ENCOUNTER — NON-APPOINTMENT (OUTPATIENT)
Age: 52
End: 2022-01-03

## 2022-01-03 ENCOUNTER — APPOINTMENT (OUTPATIENT)
Dept: ELECTROPHYSIOLOGY | Facility: CLINIC | Age: 52
End: 2022-01-03
Payer: COMMERCIAL

## 2022-01-03 PROCEDURE — 93298 REM INTERROG DEV EVAL SCRMS: CPT

## 2022-01-03 PROCEDURE — G2066: CPT

## 2022-02-03 NOTE — DISCHARGE NOTE ADULT - NS AS DC FOLLOWUP STROKE INST
1st Attempt: Left message to call office back     Stroke (includes: TIA/SAH/ICH/Ischemic Stroke) Smoking Cessation/Stroke (includes: TIA/SAH/ICH/Ischemic Stroke)

## 2022-02-07 ENCOUNTER — APPOINTMENT (OUTPATIENT)
Dept: ELECTROPHYSIOLOGY | Facility: CLINIC | Age: 52
End: 2022-02-07
Payer: COMMERCIAL

## 2022-02-07 ENCOUNTER — NON-APPOINTMENT (OUTPATIENT)
Age: 52
End: 2022-02-07

## 2022-02-07 PROCEDURE — 93298 REM INTERROG DEV EVAL SCRMS: CPT

## 2022-02-07 PROCEDURE — G2066: CPT

## 2022-03-14 ENCOUNTER — APPOINTMENT (OUTPATIENT)
Dept: ELECTROPHYSIOLOGY | Facility: CLINIC | Age: 52
End: 2022-03-14

## 2022-07-19 PROBLEM — D68.52 HETEROZYGOUS FOR PROTHROMBIN G20210A MUTATION: Status: ACTIVE | Noted: 2018-02-15

## 2022-12-16 NOTE — ED ADULT NURSE NOTE - CAS ELECT INFOMATION PROVIDED
DISPLAY PLAN FREE TEXT DISPLAY PLAN FREE TEXT DISPLAY PLAN FREE TEXT DISPLAY PLAN FREE TEXT DISPLAY PLAN FREE TEXT DISPLAY PLAN FREE TEXT DISPLAY PLAN FREE TEXT DISPLAY PLAN FREE TEXT DISPLAY PLAN FREE TEXT DISPLAY PLAN FREE TEXT DISPLAY PLAN FREE TEXT DISPLAY PLAN FREE TEXT DISPLAY PLAN FREE TEXT DISPLAY PLAN FREE TEXT DISPLAY PLAN FREE TEXT DISPLAY PLAN FREE TEXT DISPLAY PLAN FREE TEXT DISPLAY PLAN FREE TEXT DISPLAY PLAN FREE TEXT DISPLAY PLAN FREE TEXT DISPLAY PLAN FREE TEXT DISPLAY PLAN FREE TEXT DISPLAY PLAN FREE TEXT DISPLAY PLAN FREE TEXT DISPLAY PLAN FREE TEXT DC instructions

## 2022-12-18 ENCOUNTER — INPATIENT (INPATIENT)
Facility: HOSPITAL | Age: 52
LOS: 2 days | Discharge: HOME | DRG: 247 | End: 2022-12-21
Attending: STUDENT IN AN ORGANIZED HEALTH CARE EDUCATION/TRAINING PROGRAM | Admitting: STUDENT IN AN ORGANIZED HEALTH CARE EDUCATION/TRAINING PROGRAM
Payer: COMMERCIAL

## 2022-12-18 VITALS — WEIGHT: 209.44 LBS

## 2022-12-18 DIAGNOSIS — F17.210 NICOTINE DEPENDENCE, CIGARETTES, UNCOMPLICATED: ICD-10-CM

## 2022-12-18 DIAGNOSIS — I21.3 ST ELEVATION (STEMI) MYOCARDIAL INFARCTION OF UNSPECIFIED SITE: ICD-10-CM

## 2022-12-18 DIAGNOSIS — E66.9 OBESITY, UNSPECIFIED: ICD-10-CM

## 2022-12-18 DIAGNOSIS — I10 ESSENTIAL (PRIMARY) HYPERTENSION: ICD-10-CM

## 2022-12-18 DIAGNOSIS — Z88.0 ALLERGY STATUS TO PENICILLIN: ICD-10-CM

## 2022-12-18 DIAGNOSIS — Z98.890 OTHER SPECIFIED POSTPROCEDURAL STATES: Chronic | ICD-10-CM

## 2022-12-18 DIAGNOSIS — I25.118 ATHEROSCLEROTIC HEART DISEASE OF NATIVE CORONARY ARTERY WITH OTHER FORMS OF ANGINA PECTORIS: ICD-10-CM

## 2022-12-18 DIAGNOSIS — Z90.89 ACQUIRED ABSENCE OF OTHER ORGANS: Chronic | ICD-10-CM

## 2022-12-18 DIAGNOSIS — I25.83 CORONARY ATHEROSCLEROSIS DUE TO LIPID RICH PLAQUE: ICD-10-CM

## 2022-12-18 DIAGNOSIS — Z79.899 OTHER LONG TERM (CURRENT) DRUG THERAPY: ICD-10-CM

## 2022-12-18 DIAGNOSIS — Z90.49 ACQUIRED ABSENCE OF OTHER SPECIFIED PARTS OF DIGESTIVE TRACT: ICD-10-CM

## 2022-12-18 DIAGNOSIS — Z90.49 ACQUIRED ABSENCE OF OTHER SPECIFIED PARTS OF DIGESTIVE TRACT: Chronic | ICD-10-CM

## 2022-12-18 DIAGNOSIS — E78.5 HYPERLIPIDEMIA, UNSPECIFIED: ICD-10-CM

## 2022-12-18 DIAGNOSIS — Z86.73 PERSONAL HISTORY OF TRANSIENT ISCHEMIC ATTACK (TIA), AND CEREBRAL INFARCTION WITHOUT RESIDUAL DEFICITS: ICD-10-CM

## 2022-12-18 DIAGNOSIS — Z79.82 LONG TERM (CURRENT) USE OF ASPIRIN: ICD-10-CM

## 2022-12-18 DIAGNOSIS — I25.84 CORONARY ATHEROSCLEROSIS DUE TO CALCIFIED CORONARY LESION: ICD-10-CM

## 2022-12-18 DIAGNOSIS — I21.02 ST ELEVATION (STEMI) MYOCARDIAL INFARCTION INVOLVING LEFT ANTERIOR DESCENDING CORONARY ARTERY: ICD-10-CM

## 2022-12-18 LAB
ALBUMIN SERPL ELPH-MCNC: 4 G/DL — SIGNIFICANT CHANGE UP (ref 3.3–5)
ALP SERPL-CCNC: 76 U/L — SIGNIFICANT CHANGE UP (ref 40–120)
ALT FLD-CCNC: 43 U/L — SIGNIFICANT CHANGE UP (ref 12–78)
ANION GAP SERPL CALC-SCNC: 8 MMOL/L — SIGNIFICANT CHANGE UP (ref 5–17)
AST SERPL-CCNC: 35 U/L — SIGNIFICANT CHANGE UP (ref 15–37)
BASOPHILS # BLD AUTO: 0.04 K/UL — SIGNIFICANT CHANGE UP (ref 0–0.2)
BASOPHILS NFR BLD AUTO: 0.4 % — SIGNIFICANT CHANGE UP (ref 0–2)
BILIRUB SERPL-MCNC: 0.5 MG/DL — SIGNIFICANT CHANGE UP (ref 0.2–1.2)
BUN SERPL-MCNC: 20 MG/DL — SIGNIFICANT CHANGE UP (ref 7–23)
CALCIUM SERPL-MCNC: 9.3 MG/DL — SIGNIFICANT CHANGE UP (ref 8.5–10.1)
CHLORIDE SERPL-SCNC: 106 MMOL/L — SIGNIFICANT CHANGE UP (ref 96–108)
CO2 SERPL-SCNC: 27 MMOL/L — SIGNIFICANT CHANGE UP (ref 22–31)
CREAT SERPL-MCNC: 0.99 MG/DL — SIGNIFICANT CHANGE UP (ref 0.5–1.3)
EGFR: 91 ML/MIN/1.73M2 — SIGNIFICANT CHANGE UP
EOSINOPHIL # BLD AUTO: 0.27 K/UL — SIGNIFICANT CHANGE UP (ref 0–0.5)
EOSINOPHIL NFR BLD AUTO: 2.7 % — SIGNIFICANT CHANGE UP (ref 0–6)
GLUCOSE SERPL-MCNC: 119 MG/DL — HIGH (ref 70–99)
HCT VFR BLD CALC: 47.7 % — SIGNIFICANT CHANGE UP (ref 39–50)
HGB BLD-MCNC: 16.6 G/DL — SIGNIFICANT CHANGE UP (ref 13–17)
IMM GRANULOCYTES NFR BLD AUTO: 0.3 % — SIGNIFICANT CHANGE UP (ref 0–0.9)
LDH SERPL L TO P-CCNC: 209 U/L — SIGNIFICANT CHANGE UP (ref 84–241)
LYMPHOCYTES # BLD AUTO: 3.07 K/UL — SIGNIFICANT CHANGE UP (ref 1–3.3)
LYMPHOCYTES # BLD AUTO: 30.3 % — SIGNIFICANT CHANGE UP (ref 13–44)
MAGNESIUM SERPL-MCNC: 2.2 MG/DL — SIGNIFICANT CHANGE UP (ref 1.6–2.6)
MCHC RBC-ENTMCNC: 30.2 PG — SIGNIFICANT CHANGE UP (ref 27–34)
MCHC RBC-ENTMCNC: 34.8 GM/DL — SIGNIFICANT CHANGE UP (ref 32–36)
MCV RBC AUTO: 86.9 FL — SIGNIFICANT CHANGE UP (ref 80–100)
MONOCYTES # BLD AUTO: 1.07 K/UL — HIGH (ref 0–0.9)
MONOCYTES NFR BLD AUTO: 10.6 % — SIGNIFICANT CHANGE UP (ref 2–14)
NEUTROPHILS # BLD AUTO: 5.66 K/UL — SIGNIFICANT CHANGE UP (ref 1.8–7.4)
NEUTROPHILS NFR BLD AUTO: 55.7 % — SIGNIFICANT CHANGE UP (ref 43–77)
PLATELET # BLD AUTO: 286 K/UL — SIGNIFICANT CHANGE UP (ref 150–400)
POTASSIUM SERPL-MCNC: 3.6 MMOL/L — SIGNIFICANT CHANGE UP (ref 3.5–5.3)
POTASSIUM SERPL-SCNC: 3.6 MMOL/L — SIGNIFICANT CHANGE UP (ref 3.5–5.3)
PROT SERPL-MCNC: 7.3 GM/DL — SIGNIFICANT CHANGE UP (ref 6–8.3)
RBC # BLD: 5.49 M/UL — SIGNIFICANT CHANGE UP (ref 4.2–5.8)
RBC # FLD: 13.2 % — SIGNIFICANT CHANGE UP (ref 10.3–14.5)
SARS-COV-2 RNA SPEC QL NAA+PROBE: SIGNIFICANT CHANGE UP
SODIUM SERPL-SCNC: 141 MMOL/L — SIGNIFICANT CHANGE UP (ref 135–145)
TROPONIN I, HIGH SENSITIVITY RESULT: 69.09 NG/L — SIGNIFICANT CHANGE UP
WBC # BLD: 10.14 K/UL — SIGNIFICANT CHANGE UP (ref 3.8–10.5)
WBC # FLD AUTO: 10.14 K/UL — SIGNIFICANT CHANGE UP (ref 3.8–10.5)

## 2022-12-18 PROCEDURE — C1753: CPT

## 2022-12-18 PROCEDURE — C9606: CPT | Mod: LD

## 2022-12-18 PROCEDURE — C1769: CPT

## 2022-12-18 PROCEDURE — 84484 ASSAY OF TROPONIN QUANT: CPT

## 2022-12-18 PROCEDURE — 85027 COMPLETE CBC AUTOMATED: CPT

## 2022-12-18 PROCEDURE — C1874: CPT

## 2022-12-18 PROCEDURE — 80048 BASIC METABOLIC PNL TOTAL CA: CPT

## 2022-12-18 PROCEDURE — 83036 HEMOGLOBIN GLYCOSYLATED A1C: CPT

## 2022-12-18 PROCEDURE — 93005 ELECTROCARDIOGRAM TRACING: CPT

## 2022-12-18 PROCEDURE — 84100 ASSAY OF PHOSPHORUS: CPT

## 2022-12-18 PROCEDURE — 71045 X-RAY EXAM CHEST 1 VIEW: CPT | Mod: 26

## 2022-12-18 PROCEDURE — 93010 ELECTROCARDIOGRAM REPORT: CPT

## 2022-12-18 PROCEDURE — C9600: CPT | Mod: LC

## 2022-12-18 PROCEDURE — 36415 COLL VENOUS BLD VENIPUNCTURE: CPT

## 2022-12-18 PROCEDURE — 93306 TTE W/DOPPLER COMPLETE: CPT

## 2022-12-18 PROCEDURE — 92978 ENDOLUMINL IVUS OCT C 1ST: CPT | Mod: 26,LD

## 2022-12-18 PROCEDURE — C1887: CPT

## 2022-12-18 PROCEDURE — 80061 LIPID PANEL: CPT

## 2022-12-18 PROCEDURE — 83615 LACTATE (LD) (LDH) ENZYME: CPT

## 2022-12-18 PROCEDURE — 93458 L HRT ARTERY/VENTRICLE ANGIO: CPT | Mod: 59

## 2022-12-18 PROCEDURE — C1725: CPT

## 2022-12-18 PROCEDURE — 93458 L HRT ARTERY/VENTRICLE ANGIO: CPT | Mod: 26,59

## 2022-12-18 PROCEDURE — 92941 PRQ TRLML REVSC TOT OCCL AMI: CPT | Mod: LD

## 2022-12-18 PROCEDURE — 83605 ASSAY OF LACTIC ACID: CPT

## 2022-12-18 PROCEDURE — 99291 CRITICAL CARE FIRST HOUR: CPT

## 2022-12-18 PROCEDURE — 83735 ASSAY OF MAGNESIUM: CPT

## 2022-12-18 PROCEDURE — 92978 ENDOLUMINL IVUS OCT C 1ST: CPT | Mod: LD

## 2022-12-18 PROCEDURE — 84443 ASSAY THYROID STIM HORMONE: CPT

## 2022-12-18 PROCEDURE — C1894: CPT

## 2022-12-18 PROCEDURE — 83695 ASSAY OF LIPOPROTEIN(A): CPT

## 2022-12-18 RX ORDER — TICAGRELOR 90 MG/1
180 TABLET ORAL ONCE
Refills: 0 | Status: COMPLETED | OUTPATIENT
Start: 2022-12-18 | End: 2022-12-18

## 2022-12-18 RX ORDER — ASPIRIN/CALCIUM CARB/MAGNESIUM 324 MG
81 TABLET ORAL DAILY
Refills: 0 | Status: DISCONTINUED | OUTPATIENT
Start: 2022-12-19 | End: 2022-12-21

## 2022-12-18 RX ORDER — TICAGRELOR 90 MG/1
90 TABLET ORAL EVERY 12 HOURS
Refills: 0 | Status: DISCONTINUED | OUTPATIENT
Start: 2022-12-19 | End: 2022-12-21

## 2022-12-18 RX ORDER — FENTANYL CITRATE 50 UG/ML
50 INJECTION INTRAVENOUS ONCE
Refills: 0 | Status: DISCONTINUED | OUTPATIENT
Start: 2022-12-18 | End: 2022-12-18

## 2022-12-18 RX ORDER — MORPHINE SULFATE 50 MG/1
4 CAPSULE, EXTENDED RELEASE ORAL ONCE
Refills: 0 | Status: DISCONTINUED | OUTPATIENT
Start: 2022-12-18 | End: 2022-12-18

## 2022-12-18 RX ORDER — HEPARIN SODIUM 5000 [USP'U]/ML
4000 INJECTION INTRAVENOUS; SUBCUTANEOUS ONCE
Refills: 0 | Status: COMPLETED | OUTPATIENT
Start: 2022-12-18 | End: 2022-12-18

## 2022-12-18 RX ORDER — BNT162B2 ORIGINAL AND OMICRON BA.4/BA.5 .1125; .1125 MG/2.25ML; MG/2.25ML
0.3 INJECTION, SUSPENSION INTRAMUSCULAR ONCE
Refills: 0 | Status: COMPLETED | OUTPATIENT
Start: 2022-12-18 | End: 2022-12-18

## 2022-12-18 RX ORDER — SODIUM CHLORIDE 9 MG/ML
1000 INJECTION, SOLUTION INTRAVENOUS
Refills: 0 | Status: DISCONTINUED | OUTPATIENT
Start: 2022-12-18 | End: 2022-12-20

## 2022-12-18 RX ADMIN — FENTANYL CITRATE 50 MICROGRAM(S): 50 INJECTION INTRAVENOUS at 20:18

## 2022-12-18 RX ADMIN — TICAGRELOR 180 MILLIGRAM(S): 90 TABLET ORAL at 20:19

## 2022-12-18 RX ADMIN — SODIUM CHLORIDE 50 MILLILITER(S): 9 INJECTION, SOLUTION INTRAVENOUS at 22:33

## 2022-12-18 RX ADMIN — HEPARIN SODIUM 4000 UNIT(S): 5000 INJECTION INTRAVENOUS; SUBCUTANEOUS at 20:19

## 2022-12-18 RX ADMIN — MORPHINE SULFATE 4 MILLIGRAM(S): 50 CAPSULE, EXTENDED RELEASE ORAL at 20:38

## 2022-12-18 NOTE — ED PROVIDER NOTE - NSICDXPASTSURGICALHX_GEN_ALL_CORE_FT
PAST SURGICAL HISTORY:  History of lithotripsy     S/P tonsillectomy     Status post appendectomy     Status post cholecystectomy

## 2022-12-18 NOTE — CONSULT NOTE ADULT - SUBJECTIVE AND OBJECTIVE BOX
Date of Consult: 12/18/2022    CHIEF COMPLAINT: chest pain    HISTORY OF PRESENT ILLNESS:  52 year old man with obesity, HTN, HLD  (on statin), active smoker who presents with chest pain. Has had intermittent chest pain during exercise for the past 2 weeks with hand numbness associated with it. This happened severely today prior to presentation at home. [facility] called with poor pre-arrival EKG transmit. EKG reviewed on arrival to  ER with now hyperacute T waves and reciprocal depressions, with STEMI activation at this time. ASA given by EMS with ticagrelor load in the ER with 4000 units of heparin. Patient taken emergently to the cath lab. pLAD 99% lesion noted with 1 LYLE.     Allergies    penicillin (Rash)    Intolerances      MEDICATIONS:    lactated ringers. 1000 milliLiter(s) IV Continuous <Continuous>    PAST MEDICAL & SURGICAL HISTORY:  Calculus of ureter  CVA (cerebral vascular accident)  TIA (transient ischemic attack)  HTN (hypertension)  HLD (hyperlipidemia)  Kidney stone  Gall stone  Status post cholecystectomy  Status post appendectomy  S/P tonsillectomy    History of lithotripsy      FAMILY HISTORY:  Family history of heart disease (Grandparent)      SOCIAL HISTORY:    [ ] Non-smoker  [ ] Smoker  [ ] Alcohol    REVIEW OF SYSTEMS:  See HPI. Otherwise, 10 point ROS done and otherwise negative.    PHYSICAL EXAM:  T(C): 37.2 (12-18-22 @ 20:45), Max: 37.2 (12-18-22 @ 20:45)  HR: 81 (12-18-22 @ 22:17) (80 - 83)  BP: 123/64 (12-18-22 @ 22:00) (123/64 - 124/72)  RR: 15 (12-18-22 @ 22:17) (15 - 22)  SpO2: 100% (12-18-22 @ 22:17) (100% - 100%)  Wt(kg): --  I&O's Summary      Appearance: Normal	  HEENT:   Normal oral mucosa, PERRL, EOMI	  Lymphatic: No lymphadenopathy  Cardiovascular: Normal S1 S2, No JVD, No murmurs, No edema  Respiratory: Lungs clear to auscultation	  Psychiatry: A & O x 3, Mood & affect appropriate  Gastrointestinal:  Soft, Non-tender, + BS	  Skin: No rashes, No ecchymoses, No cyanosis	  Neurologic: Non-focal  Extremities: Normal range of motion, No clubbing, cyanosis or edema  Vascular: Peripheral pulses palpable 2+ bilaterally        LABS:	 	    CBC Full  -  ( 18 Dec 2022 20:04 )  WBC Count : 10.14 K/uL  Hemoglobin : 16.6 g/dL  Hematocrit : 47.7 %  Platelet Count - Automated : 286 K/uL  Mean Cell Volume : 86.9 fl  Mean Cell Hemoglobin : 30.2 pg  Mean Cell Hemoglobin Concentration : 34.8 gm/dL  Auto Neutrophil # : 5.66 K/uL  Auto Lymphocyte # : 3.07 K/uL  Auto Monocyte # : 1.07 K/uL  Auto Eosinophil # : 0.27 K/uL  Auto Basophil # : 0.04 K/uL  Auto Neutrophil % : 55.7 %  Auto Lymphocyte % : 30.3 %  Auto Monocyte % : 10.6 %  Auto Eosinophil % : 2.7 %  Auto Basophil % : 0.4 %    12-18    141  |  106  |  20  ----------------------------<  119<H>  3.6   |  27  |  0.99    Ca    9.3      18 Dec 2022 20:04  Mg     2.2     12-18    TPro  7.3  /  Alb  4.0  /  TBili  0.5  /  DBili  x   /  AST  35  /  ALT  43  /  AlkPhos  76  12-18    proBNP:   Lipid Profile:   HgA1c:   TSH:     CARDIAC MARKERS:    TELEMETRY: 	    ECG:  	  RADIOLOGY:  OTHER: 	    PREVIOUS DIAGNOSTIC TESTING:    [ ] Echocardiogram:  [ ]  Catheterization:  [ ] Stress Test:  	  	  ASSESSMENT/PLAN: 	   Date of Consult: 12/18/2022    CHIEF COMPLAINT: chest pain    HISTORY OF PRESENT ILLNESS:  52 year old man with obesity, HTN, HLD  (on statin), active smoker who presents with chest pain. Has had intermittent chest pain during exercise for the past 2 weeks with hand numbness associated with it. This happened severely today prior to presentation at home. [facility] called with poor pre-arrival EKG transmit. EKG reviewed on arrival to  ER with now hyperacute T waves and reciprocal depressions, with STEMI activation at this time. ASA given by EMS with ticagrelor load in the ER with 4000 units of heparin. Patient taken emergently to the cath lab. pLAD 99% lesion noted with 1 LYLE.     Allergies    penicillin (Rash)    Intolerances      MEDICATIONS:    lactated ringers. 1000 milliLiter(s) IV Continuous <Continuous>    PAST MEDICAL & SURGICAL HISTORY:  Calculus of ureter  CVA (cerebral vascular accident)  TIA (transient ischemic attack)  HTN (hypertension)  HLD (hyperlipidemia)  Kidney stone  Gall stone  Status post cholecystectomy  Status post appendectomy  S/P tonsillectomy    History of lithotripsy      FAMILY HISTORY:  Family history of heart disease (Grandparent)      SOCIAL HISTORY:    [ ] Non-smoker  [ ] Smoker  [ ] Alcohol    REVIEW OF SYSTEMS:  See HPI. Otherwise, 10 point ROS done and otherwise negative.    PHYSICAL EXAM:  T(C): 37.2 (12-18-22 @ 20:45), Max: 37.2 (12-18-22 @ 20:45)  HR: 81 (12-18-22 @ 22:17) (80 - 83)  BP: 123/64 (12-18-22 @ 22:00) (123/64 - 124/72)  RR: 15 (12-18-22 @ 22:17) (15 - 22)  SpO2: 100% (12-18-22 @ 22:17) (100% - 100%)  Wt(kg): --  I&O's Summary      Appearance: Normal	  HEENT:   Normal oral mucosa, PERRL, EOMI	  Lymphatic: No lymphadenopathy  Cardiovascular: Normal S1 S2, No JVD, No murmurs, No edema  Respiratory: Lungs clear to auscultation	  Psychiatry: A & O x 3, Mood & affect appropriate  Gastrointestinal:  Soft, Non-tender, + BS	  Skin: No rashes, No ecchymoses, No cyanosis	  Neurologic: Non-focal  Extremities: Normal range of motion, No clubbing, cyanosis or edema  Vascular: Peripheral pulses palpable 2+ bilaterally        LABS:	 	    CBC Full  -  ( 18 Dec 2022 20:04 )  WBC Count : 10.14 K/uL  Hemoglobin : 16.6 g/dL  Hematocrit : 47.7 %  Platelet Count - Automated : 286 K/uL  Mean Cell Volume : 86.9 fl  Mean Cell Hemoglobin : 30.2 pg  Mean Cell Hemoglobin Concentration : 34.8 gm/dL  Auto Neutrophil # : 5.66 K/uL  Auto Lymphocyte # : 3.07 K/uL  Auto Monocyte # : 1.07 K/uL  Auto Eosinophil # : 0.27 K/uL  Auto Basophil # : 0.04 K/uL  Auto Neutrophil % : 55.7 %  Auto Lymphocyte % : 30.3 %  Auto Monocyte % : 10.6 %  Auto Eosinophil % : 2.7 %  Auto Basophil % : 0.4 %    12-18    141  |  106  |  20  ----------------------------<  119<H>  3.6   |  27  |  0.99    Ca    9.3      18 Dec 2022 20:04  Mg     2.2     12-18    TPro  7.3  /  Alb  4.0  /  TBili  0.5  /  DBili  x   /  AST  35  /  ALT  43  /  AlkPhos  76  12-18    proBNP:   Lipid Profile:   HgA1c:   TSH:     CARDIAC MARKERS:    TELEMETRY: 	    ECG:  	  RADIOLOGY:  OTHER: 	    PREVIOUS DIAGNOSTIC TESTING:    [ ] Echocardiogram:  [ ]  Catheterization:     Impression     Diagnostic Conclusions   1. pLAD culprit 99% with RICH 2 flow   2. Severe atherosclerosis of OM1   3. EF 30% with wrap around LAD territory. LVEDP 31 at start of case.     Interventional Conclusions     1. Successful LYLE placement to proximal LAD with IVUS used to guide post   dilation and stent optimization.   2. Heavily calcified vessel seen diffusely on IVUS with acceptable   proximal and distal plaque burden.   3. LVEDP post revascularization 19.     Recommendations     1. Continue aspirin 81 mg daily and ticagrelor 90 mg bid   2. Recommend aggressive medical management for CAD as per ACC/AHA   guidelines with risk factor modification including smoking cessation.   3. Gentle hydration overnight   4. TTE tomorrow in CCU   5. plan for stage PCI to Cx pending clinical course   6. findings relayed to patient, patient's wife, and CCU team      [ ] Stress Test:  	  	  ASSESSMENT/PLAN:

## 2022-12-18 NOTE — H&P ADULT - ASSESSMENT
52 year old male PMHx HTN, HLD, CVA/TIA  Presents to ED tonight with Non-radiating Substernal 8/10 Chest pain after walking up stairs.  Associated with SOB, Diaphoresis and Vomiting.  Patient describes episodes of self resolving Cp over the past 2 weeks.        STEMI   S/P  Emergent Cardiac cath with 1 LYLE  to proximal LAD    Admit to CCU Post Cath   Radial band off per protocol  Trend Trop till peak   ASA  Brilinta in AM   Cardio to eval B'Blocker / ACE and Statin in AM   LR @ 50cclr x 6 hrs   Check Lipids, TSH, A1C And Lipoprot A in AM   Has Cx Dz and to have staged PCI Tuesday   Case D/W Dr Reed Cardiology   Dash Diet   Watch lytes  SCD's

## 2022-12-18 NOTE — ED PROVIDER NOTE - PROGRESS NOTE DETAILS
Andrei Schwartz: Discussd with Dr. Reed, code STEMI called. Pt receiving 180mg Brilinta and 4000mg IV heparin. Already received 325mg aspirin PTA, will give 50mcg of fentanyl for pain.

## 2022-12-18 NOTE — PATIENT PROFILE ADULT - HAVE YOU RECEIVED AT LEAST TWO PFIZER AND/OR MODERNA VACCINATIONS (IN ANY COMBINATION) AND/OR ONE JOHNSON & JOHNSON VACCINATION?
"Chief Complaint   Patient presents with     ER F/U     ER F/U for bug bite and concussion     Health Maintenance     orders pended       Initial /89  Pulse 86  Temp 98.1  F (36.7  C) (Oral)  Resp 16  Ht 6' 1\" (1.854 m)  Wt 206 lb (93.4 kg)  SpO2 97%  BMI 27.18 kg/m2 Estimated body mass index is 27.18 kg/(m^2) as calculated from the following:    Height as of this encounter: 6' 1\" (1.854 m).    Weight as of this encounter: 206 lb (93.4 kg).  Medication Reconciliation: complete      Shane Lynn MA    " Yes

## 2022-12-18 NOTE — H&P ADULT - NSICDXPASTMEDICALHX_GEN_ALL_CORE_FT
PAST MEDICAL HISTORY:  Calculus of ureter     CVA (cerebral vascular accident)     Gall stone     HLD (hyperlipidemia)     HTN (hypertension)     Kidney stone     TIA (transient ischemic attack)

## 2022-12-18 NOTE — ED ADULT TRIAGE NOTE - CHIEF COMPLAINT QUOTE
Pt came in from home with chest pain approximately 30 min ago radiating to the left arm. Prior to arrival 4 of aspirin and 0.8 nitro sublingual was given. Prior to arrival Pt vomited twice and ECG completed in ambulance showing ST elevation. history of HTN and takes Crestor losartan and Norvasc at home. MD Schwartz aware. Pt taken to trauma room and ECG completed and MD at bedside.

## 2022-12-18 NOTE — H&P ADULT - NSHPREVIEWOFSYSTEMS_GEN_ALL_CORE
Review of Systems:  CONSTITUTIONAL: No fever, chills, or fatigue  EYES: No eye pain, visual disturbances, or discharge  ENMT:  No difficulty hearing, tinnitus, vertigo; No sinus or throat pain  NECK: No pain or stiffness  RESPIRATORY: No cough, wheezing, chills or hemoptysis; No shortness of breath  CARDIOVASCULAR: No chest pain, palpitations, dizziness, or leg swelling  GASTROINTESTINAL: No abdominal or epigastric pain. No nausea, vomiting, or hematemesis; No diarrhea or constipation. No melena or hematochezia.  GENITOURINARY: No dysuria, frequency, hematuria, or incontinence  NEUROLOGICAL: No headaches, memory loss, loss of strength, numbness, or tremors  SKIN: No itching, burning, rashes, or lesions   MUSCULOSKELETAL: No joint pain or swelling; No muscle, back, or extremity pain  PSYCHIATRIC: No depression, anxiety, mood swings, or difficulty sleeping Review of Systems:  CONSTITUTIONAL: No fever, chills, or fatigue  EYES: No eye pain, visual disturbances, or discharge  ENMT:  No difficulty hearing, tinnitus, vertigo; No sinus or throat pain  NECK: No pain or stiffness  RESPIRATORY: No cough, wheezing, chills or hemoptysis; (+)  shortness of breath  CARDIOVASCULAR: (+)  chest pain,No  palpitations, dizziness, or leg swelling  GASTROINTESTINAL: No abdominal or epigastric pain. No nausea, (+) vomiting, No r hematemesis; No diarrhea or constipation. No melena or hematochezia.  GENITOURINARY: No dysuria, frequency, hematuria, or incontinence  NEUROLOGICAL: No headaches, memory loss, loss of strength, numbness, or tremors  SKIN: No itching, burning, rashes, or lesions   MUSCULOSKELETAL: No joint pain or swelling; No muscle, back, or extremity pain  PSYCHIATRIC: No depression, anxiety, mood swings, or difficulty sleeping

## 2022-12-18 NOTE — ED ADULT NURSE NOTE - OBJECTIVE STATEMENT
PT presents A&OX4 c/o chest pain X 1 hr. PT BIBEMS with STEMI, medicated as per orders, sent to cath lab stat. PT reports he climbed flight of stairs, felt chest pain, dizziness and vomited. No cardiac hx. PT skin color appropriate for race, medicated for pain. 2 18gauge ivs in place. Transported pt @ 2050. Jean Pleitez RN

## 2022-12-18 NOTE — PATIENT PROFILE ADULT - FALL HARM RISK - HARM RISK INTERVENTIONS

## 2022-12-18 NOTE — ED ADULT TRIAGE NOTE - PAIN RATING/NUMBER SCALE (0-10): REST
How Severe Is Your Skin Lesion?: moderate Has Your Skin Lesion Been Treated?: not been treated Is This A New Presentation, Or A Follow-Up?: Mole 10

## 2022-12-18 NOTE — ED PROVIDER NOTE - CLINICAL SUMMARY MEDICAL DECISION MAKING FREE TEXT BOX
CXR unremarkable.  EKG with anterior STEMI with inferior/lateral depressions.  STEMI team activated.  Patient received Brilinta, Heparin, ASA (prior to arrival), Fentanyl, and Morphine.  Went for emergent PCI with Dr. Reed.

## 2022-12-18 NOTE — ED PROVIDER NOTE - OBJECTIVE STATEMENT
51 y/o male with PMHx of TIA, CVA, HTN, and HLD presents to the ED BIBEMS c/o chest pain. Pt reports pain started about an hour ago. Pt received 4 aspirins and 2 nitro from EMS with no relief of symptoms. Describes the pain as 7/10 in severity that is localized to the left side of the chest. + Family Hx of CAD. Pt is a smoker, smokes about 5-6 cigarettes everyday. On Losartan and Norvasc.

## 2022-12-18 NOTE — CONSULT NOTE ADULT - ASSESSMENT
52 year old man with obesity, HTN, HLD  (on statin), active smoker who presents with STEMI with pLAD culprit revascularized. EF 30% with wrap around LAD territory. EKG with septal infarct speaking to chronicity. LVEDP 31 at start of case and LVEDP 18 at end.   -continue with aspirin 81 mg daily as well as ticagrelor 90 mg bid  -start atorvastatin 80 mg daily   -monitor BP closely in CCU overnight with eventual plan for low dose ARB (losartan 25 mg daily) if stable blood pressures overnight. Hold CCB.   -will consider low dose beta blocker if patient tolerates ARB well  -gentle hydration overnight   -TTE tomorrow  -please obtain A1c, lipids, TSH, lipoprotein A  -plan for stage PCI to Cx pending clinical course   -findings relayed to patient, patient's wife, and CCU team     Thank you for allowing me to participate in the care of your patient. Feel free to contact me if any clinical issues arise via contact information below or MS Teams.    Surya Reed MD, FACC, [facility]   Director, Interventional Cardiology, [address], [facility] Floor, [address] Office: [phone]  [place] Office: [phone]  Email: [email or web address]   52 year old man with obesity, HTN, HLD  (on statin), active smoker who presents with STEMI with pLAD culprit revascularized. EF 30% with wrap around LAD territory. EKG with septal infarct speaking to chronicity. LVEDP 31 at start of case and LVEDP 18 at end.   -continue with aspirin 81 mg daily as well as ticagrelor 90 mg bid  -start atorvastatin 80 mg daily   -monitor BP closely in CCU overnight with eventual plan for low dose beta blocker (metoprolol 12.5 mg bid) if stable blood pressures overnight. Hold CCB.   -will consider low dose ARB (losartan 25 mg) if patient tolerates BB tomorrow and Cr remains stable   -gentle hydration overnight   -TTE tomorrow  -please obtain A1c, lipids, TSH, lipoprotein A, lactate   -plan for stage PCI to Cx pending clinical course   -findings relayed to patient, patient's wife, and CCU team     Thank you for allowing me to participate in the care of your patient. Feel free to contact me if any clinical issues arise via contact information below or MS Teams.    Surya Reed MD, FACC, [facility]   Director, Interventional Cardiology, [address], [facility] Floor, [address] Office: [phone]  [place] Office: [phone]  Email: [email or web address]   52 year old man with obesity, HTN, HLD  (on statin), active smoker who presents with STEMI with pLAD culprit revascularized. EF 30% with wrap around LAD territory. EKG with septal infarct speaking to chronicity. LVEDP 31 at start of case and LVEDP 18 at end.   -continue with aspirin 81 mg daily as well as ticagrelor 90 mg bid  -start atorvastatin 80 mg daily   -monitor BP closely in CCU overnight with eventual plan for low dose beta blocker (metoprolol 12.5 mg bid) if stable blood pressures overnight. Hold CCB.   -will consider low dose ARB (losartan 25 mg) if patient tolerates BB tomorrow and Cr remains stable   -gentle hydration overnight   -TTE tomorrow  -please obtain A1c, lipids, TSH, lipoprotein A, lactate and CE till peak   -plan for stage PCI to Cx pending clinical course   -findings relayed to patient, patient's wife, and CCU team     Thank you for allowing me to participate in the care of your patient. Feel free to contact me if any clinical issues arise via contact information below or MS Teams.    Surya Reed MD, FACC, [facility]   Director, Interventional Cardiology, [address], [facility] Floor, [address] Office: [phone]  [place] Office: [phone]  Email: [email or web address]

## 2022-12-18 NOTE — H&P ADULT - NSHPPHYSICALEXAM_GEN_ALL_CORE
Physical Examination:    General:  Awake,  Alert, oriented, interactive, nonfocal    HEENT: Pupils equal, reactive to light.  Symmetric. No JVD.    PULM: Clear to auscultation bilaterally, no significant sputum production    CVS: Regular rate and rhythm, no murmurs, rubs, or gallops    ABD: Soft, nondistended, nontender, normoactive bowel sounds, no masses    EXT: No edema, nontender, R radial band in place, NVI     SKIN: Warm and well perfused, no rashes noted.

## 2022-12-18 NOTE — H&P ADULT - HISTORY OF PRESENT ILLNESS
52 year old male PMHx HTN, HLD, CVA/TIA  Presents to ED tonight with Non-radiating Substernal 8/10 Chest pain after walking up stairs.  Associated with SOB, Diaphoresis and Vomiting.  Patient describes episodes of self resolving Cp over the past 2 weeks.  1st episode during Elipitical use.  Underwent Emergent Cardiac cath with 1 LYLE  to proximal LAD.  Pain free on exam.

## 2022-12-19 LAB
A1C WITH ESTIMATED AVERAGE GLUCOSE RESULT: 5.3 % — SIGNIFICANT CHANGE UP (ref 4–5.6)
ADD ON TEST-SPECIMEN IN LAB: SIGNIFICANT CHANGE UP
ANION GAP SERPL CALC-SCNC: 9 MMOL/L — SIGNIFICANT CHANGE UP (ref 5–17)
BUN SERPL-MCNC: 21 MG/DL — SIGNIFICANT CHANGE UP (ref 7–23)
CALCIUM SERPL-MCNC: 8.8 MG/DL — SIGNIFICANT CHANGE UP (ref 8.5–10.1)
CHLORIDE SERPL-SCNC: 110 MMOL/L — HIGH (ref 96–108)
CHOLEST SERPL-MCNC: 109 MG/DL — SIGNIFICANT CHANGE UP
CO2 SERPL-SCNC: 23 MMOL/L — SIGNIFICANT CHANGE UP (ref 22–31)
CREAT SERPL-MCNC: 0.92 MG/DL — SIGNIFICANT CHANGE UP (ref 0.5–1.3)
EGFR: 99 ML/MIN/1.73M2 — SIGNIFICANT CHANGE UP
ESTIMATED AVERAGE GLUCOSE: 105 MG/DL — SIGNIFICANT CHANGE UP (ref 68–114)
GLUCOSE SERPL-MCNC: 126 MG/DL — HIGH (ref 70–99)
HCT VFR BLD CALC: 44.2 % — SIGNIFICANT CHANGE UP (ref 39–50)
HDLC SERPL-MCNC: 35 MG/DL — LOW
HGB BLD-MCNC: 15.2 G/DL — SIGNIFICANT CHANGE UP (ref 13–17)
LACTATE SERPL-SCNC: 1.3 MMOL/L — SIGNIFICANT CHANGE UP (ref 0.7–2)
LIPID PNL WITH DIRECT LDL SERPL: 49 MG/DL — SIGNIFICANT CHANGE UP
MAGNESIUM SERPL-MCNC: 2.4 MG/DL — SIGNIFICANT CHANGE UP (ref 1.6–2.6)
MCHC RBC-ENTMCNC: 29.9 PG — SIGNIFICANT CHANGE UP (ref 27–34)
MCHC RBC-ENTMCNC: 34.4 GM/DL — SIGNIFICANT CHANGE UP (ref 32–36)
MCV RBC AUTO: 86.8 FL — SIGNIFICANT CHANGE UP (ref 80–100)
NON HDL CHOLESTEROL: 74 MG/DL — SIGNIFICANT CHANGE UP
PHOSPHATE SERPL-MCNC: 2.9 MG/DL — SIGNIFICANT CHANGE UP (ref 2.5–4.5)
PLATELET # BLD AUTO: 230 K/UL — SIGNIFICANT CHANGE UP (ref 150–400)
POTASSIUM SERPL-MCNC: 3.8 MMOL/L — SIGNIFICANT CHANGE UP (ref 3.5–5.3)
POTASSIUM SERPL-SCNC: 3.8 MMOL/L — SIGNIFICANT CHANGE UP (ref 3.5–5.3)
RBC # BLD: 5.09 M/UL — SIGNIFICANT CHANGE UP (ref 4.2–5.8)
RBC # FLD: 13.2 % — SIGNIFICANT CHANGE UP (ref 10.3–14.5)
SODIUM SERPL-SCNC: 142 MMOL/L — SIGNIFICANT CHANGE UP (ref 135–145)
TRIGL SERPL-MCNC: 126 MG/DL — SIGNIFICANT CHANGE UP
TROPONIN I, HIGH SENSITIVITY RESULT: HIGH NG/L
TSH SERPL-MCNC: 2.28 UU/ML — SIGNIFICANT CHANGE UP (ref 0.34–4.82)
WBC # BLD: 10.82 K/UL — HIGH (ref 3.8–10.5)
WBC # FLD AUTO: 10.82 K/UL — HIGH (ref 3.8–10.5)

## 2022-12-19 PROCEDURE — 93306 TTE W/DOPPLER COMPLETE: CPT | Mod: 26

## 2022-12-19 PROCEDURE — 99291 CRITICAL CARE FIRST HOUR: CPT

## 2022-12-19 PROCEDURE — 93010 ELECTROCARDIOGRAM REPORT: CPT | Mod: 76

## 2022-12-19 PROCEDURE — 99233 SBSQ HOSP IP/OBS HIGH 50: CPT

## 2022-12-19 RX ORDER — AMLODIPINE BESYLATE 2.5 MG/1
1 TABLET ORAL
Qty: 0 | Refills: 0 | DISCHARGE

## 2022-12-19 RX ORDER — ATORVASTATIN CALCIUM 80 MG/1
40 TABLET, FILM COATED ORAL AT BEDTIME
Refills: 0 | Status: DISCONTINUED | OUTPATIENT
Start: 2022-12-19 | End: 2022-12-19

## 2022-12-19 RX ORDER — HEPARIN SODIUM 5000 [USP'U]/ML
5000 INJECTION INTRAVENOUS; SUBCUTANEOUS EVERY 8 HOURS
Refills: 0 | Status: DISCONTINUED | OUTPATIENT
Start: 2022-12-19 | End: 2022-12-21

## 2022-12-19 RX ORDER — MORPHINE SULFATE 50 MG/1
2 CAPSULE, EXTENDED RELEASE ORAL ONCE
Refills: 0 | Status: DISCONTINUED | OUTPATIENT
Start: 2022-12-19 | End: 2022-12-19

## 2022-12-19 RX ORDER — ATORVASTATIN CALCIUM 80 MG/1
80 TABLET, FILM COATED ORAL AT BEDTIME
Refills: 0 | Status: DISCONTINUED | OUTPATIENT
Start: 2022-12-19 | End: 2022-12-21

## 2022-12-19 RX ORDER — ROSUVASTATIN CALCIUM 5 MG/1
1 TABLET ORAL
Qty: 0 | Refills: 0 | DISCHARGE

## 2022-12-19 RX ORDER — ALPRAZOLAM 0.25 MG
0.5 TABLET ORAL ONCE
Refills: 0 | Status: DISCONTINUED | OUTPATIENT
Start: 2022-12-19 | End: 2022-12-19

## 2022-12-19 RX ORDER — ASPIRIN AND DIPYRIDAMOLE 25; 200 MG/1; MG/1
1 CAPSULE, EXTENDED RELEASE ORAL
Qty: 0 | Refills: 0 | DISCHARGE

## 2022-12-19 RX ORDER — METOPROLOL TARTRATE 50 MG
12.5 TABLET ORAL
Refills: 0 | Status: DISCONTINUED | OUTPATIENT
Start: 2022-12-19 | End: 2022-12-21

## 2022-12-19 RX ADMIN — Medication 0.5 MILLIGRAM(S): at 23:41

## 2022-12-19 RX ADMIN — MORPHINE SULFATE 2 MILLIGRAM(S): 50 CAPSULE, EXTENDED RELEASE ORAL at 04:30

## 2022-12-19 RX ADMIN — ATORVASTATIN CALCIUM 80 MILLIGRAM(S): 80 TABLET, FILM COATED ORAL at 21:31

## 2022-12-19 RX ADMIN — TICAGRELOR 90 MILLIGRAM(S): 90 TABLET ORAL at 12:19

## 2022-12-19 RX ADMIN — Medication 12.5 MILLIGRAM(S): at 12:20

## 2022-12-19 RX ADMIN — MORPHINE SULFATE 2 MILLIGRAM(S): 50 CAPSULE, EXTENDED RELEASE ORAL at 04:04

## 2022-12-19 RX ADMIN — HEPARIN SODIUM 5000 UNIT(S): 5000 INJECTION INTRAVENOUS; SUBCUTANEOUS at 15:45

## 2022-12-19 RX ADMIN — TICAGRELOR 90 MILLIGRAM(S): 90 TABLET ORAL at 21:31

## 2022-12-19 RX ADMIN — Medication 81 MILLIGRAM(S): at 12:19

## 2022-12-19 RX ADMIN — HEPARIN SODIUM 5000 UNIT(S): 5000 INJECTION INTRAVENOUS; SUBCUTANEOUS at 21:30

## 2022-12-19 RX ADMIN — Medication 12.5 MILLIGRAM(S): at 21:31

## 2022-12-19 NOTE — PROGRESS NOTE ADULT - ASSESSMENT
52 year old man with obesity, HTN, HLD  (on statin), active smoker who presented with chest pain. EKG reviewed on arrival to  ER with hyperacute T waves and reciprocal depressions,  Code STEMI activated Patient taken emergently to the cath lab. pLAD 99% lesion noted with 1 LYLE. pLAD culprit revascularized. EF 30% with wrap around LAD territory.  Severe atherosclerosis of OM1 plan for stage PCI to Cx.   Pt. had a episode of chest pain this morning which was alleviated with morphine   Pt. started on low dose BB today will increase dose tomorrow if pt. BP tolerates it     1.Continue aspirin 81 mg daily and ticagrelor 90 mg bid, lipitor 80mg    2. Recommend aggressive medical management for CAD as per ACC/AHA guidelines with risk factor modification including smoking cessation.  3. TTE pending   4. Discussed therapeutic lifestyle changes to reduce risk factors such as following a cardiac diet, weight loss, maintaining a healthy weight, exercise, smoking cessation, medication compliance, and regular follow-up  with MD to know our numbers (BP, cholesterol, weight, and glucose  5. Pt. offered cardiac rehab will decide in the future declines for now   -Follow-up with attending/cardiologist

## 2022-12-19 NOTE — PROGRESS NOTE ADULT - SUBJECTIVE AND OBJECTIVE BOX
NP Progress Note     Follow Up: STEMI S/P LYLE     HPI:  52 year old man with obesity, HTN, HLD  (on statin), active smoker who presents with chest pain. Has had intermittent chest pain during exercise for the past 2 weeks with hand numbness associated with it. This happened severely today prior to presentation at home. [facility] called with poor pre-arrival EKG transmit. EKG reviewed on arrival to  ER with now hyperacute T waves and reciprocal depressions, with STEMI activation at this time. ASA given by EMS with ticagrelor load in the ER with 4000 units of heparin. Patient taken emergently to the cath lab. pLAD 99% lesion noted with 1 LYLE.     Subjective/Observations: Pt. seen and examined and evaluated. Pt. resting comfortably in bed in NAD, with no respiratory distress, no chest pain, dyspnea, palpitations, PND, or orthopnea. Pt. reports that earlier this morning he had chest pain 10/10 which was alleviated with morphine.     REVIEW OF SYSTEMS: All other review of systems is negative unless indicated above    MEDICATIONS  (STANDING):  aspirin  chewable 81 milliGRAM(s) Oral daily  atorvastatin 40 milliGRAM(s) Oral at bedtime  coronavirus bivalent (EUA) Booster Vaccine (PFIZER) 0.3 milliLiter(s) IntraMuscular once  lactated ringers. 1000 milliLiter(s) (50 mL/Hr) IV Continuous <Continuous>  metoprolol tartrate 12.5 milliGRAM(s) Oral two times a day  ticagrelor 90 milliGRAM(s) Oral every 12 hours    MEDICATIONS  (PRN):    Allergies: penicillin (Rash)      Vital Signs Last 24 Hrs  T(C): 36.7 (19 Dec 2022 11:07), Max: 37.2 (18 Dec 2022 20:45)  T(F): 98 (19 Dec 2022 11:07), Max: 98.9 (18 Dec 2022 20:45)  HR: 77 (19 Dec 2022 10:00) (63 - 83)  BP: 107/73 (19 Dec 2022 10:00) (89/44 - 140/98)  BP(mean): 81 (19 Dec 2022 10:00) (45 - 107)  RR: 18 (19 Dec 2022 10:00) (12 - 27)  SpO2: 100% (19 Dec 2022 10:00) (96% - 100%)    Parameters below as of 19 Dec 2022 10:00  Patient On (Oxygen Delivery Method): room air        I&O's Summary    18 Dec 2022 07:01  -  19 Dec 2022 07:00  --------------------------------------------------------  IN: 300 mL / OUT: 425 mL / NET: -125 mL      Weight (kg): 95 (12-18 @ 20:12)        LABS: All Labs Reviewed:                        15.2   10.82 )-----------( 230      ( 19 Dec 2022 05:38 )             44.2                          19 Dec 2022 05:38    142    |  110    |  21     ----------------------------<  126    3.8     |  23     |  0.92     Ca    8.8        19 Dec 2022 05:38  Phos  2.9       19 Dec 2022 05:38  Mg     2.4       19 Dec 2022 05:38  TPro  7.3    /  Alb  4.0    /  TBili  0.5    /  DBili  x      /  AST  35     /  ALT  43     /  AlkPhos  76     18 Dec 2022 20:04    Echo: Pending     Cath:  < from: Cardiac Catheterization (12.18.22 @ 21:01) >   Impression   Diagnostic Conclusions   1. pLAD culprit 99% with RICH 2 flow   2. Severe atherosclerosis of OM1   3. EF 30% with wrap around LAD territory. LVEDP 31 at start of case.     Interventional Conclusions   1. Successful LYLE placement to proximal LAD with IVUS used to guide post   dilation and stent optimization.   2. Heavily calcified vessel seen diffusely on IVUS with acceptable   proximal and distal plaque burden.   3. LVEDP post revascularization 19.      EKG: NSR @ 67 BPM inverted T-waves     Interpretation of Telemetry: HR 65-73 BPM PVC's       Physical Exam:  Appearance: [ ] Normal  [ ] abnormal [X ] NAD   Eyes: [ ] PERRL [ ] EOMI  HEENT: [ ] Normal [ ] Abnormal oral mucosa [ ]NC/AT  Cardiovascular: [X ] S1 [X ] S2 [ ] RRR [ ] m/r/g [ ]edema [ ] JVP  Procedural Access Site: [X] Rt. radial band-aid noted site benign soft no bleeding no hematoma no c/o numbness/tingling, <3sec car refill, fingers/hand warm to touch +2 radial pulse   Respiratory: [X ] Clear to auscultation bilaterally  Gastrointestinal: [ ] Soft [ ] tenderness[ ] distension [ ] BS  Musculoskeletal: [ ] clubbing [ ] joint deformity   Neurologic: [ ] Non-focal  Lymphatic: [ ] lymphadenopathy  Psychiatry: [X ] AAOx3  [ ] confused [ ] disoriented [ ] Mood & affect appropriate  Skin: [ ]  rashes [ ] ecchymoses [ ] cyanosis

## 2022-12-19 NOTE — PROGRESS NOTE ADULT - SUBJECTIVE AND OBJECTIVE BOX
Patient is a 52y old  Male who presents with a chief complaint of STEMI (18 Dec 2022 22:52)    24 hour events:     PAST MEDICAL & SURGICAL HISTORY:  Calculus of ureter      CVA (cerebral vascular accident)      TIA (transient ischemic attack)      HTN (hypertension)      HLD (hyperlipidemia)      Kidney stone      Gall stone      Status post cholecystectomy      Status post appendectomy      S/P tonsillectomy      History of lithotripsy          Allergies    penicillin (Rash)    Intolerances      REVIEW OF SYSTEMS: SEE BELOW       ICU Vital Signs Last 24 Hrs  T(C): 36.2 (19 Dec 2022 05:40), Max: 37.2 (18 Dec 2022 20:45)  T(F): 97.2 (19 Dec 2022 05:40), Max: 98.9 (18 Dec 2022 20:45)  HR: 68 (19 Dec 2022 08:00) (63 - 83)  BP: 122/82 (19 Dec 2022 08:00) (89/44 - 140/98)  BP(mean): 92 (19 Dec 2022 08:00) (45 - 107)  ABP: --  ABP(mean): --  RR: 12 (19 Dec 2022 08:00) (12 - 27)  SpO2: 99% (19 Dec 2022 08:00) (96% - 100%)    O2 Parameters below as of 19 Dec 2022 08:00  Patient On (Oxygen Delivery Method): room air            CAPILLARY BLOOD GLUCOSE          I&O's Summary    18 Dec 2022 07:01  -  19 Dec 2022 07:00  --------------------------------------------------------  IN: 300 mL / OUT: 425 mL / NET: -125 mL            MEDICATIONS  (STANDING):  aspirin  chewable 81 milliGRAM(s) Oral daily  atorvastatin 40 milliGRAM(s) Oral at bedtime  coronavirus bivalent (EUA) Booster Vaccine (PFIZER) 0.3 milliLiter(s) IntraMuscular once  lactated ringers. 1000 milliLiter(s) (50 mL/Hr) IV Continuous <Continuous>  metoprolol tartrate 12.5 milliGRAM(s) Oral two times a day  ticagrelor 90 milliGRAM(s) Oral every 12 hours      MEDICATIONS  (PRN):      PHYSICAL EXAM: SEE BELOW                          15.2   10.82 )-----------( 230      ( 19 Dec 2022 05:38 )             44.2       12-19    142  |  110<H>  |  21  ----------------------------<  126<H>  3.8   |  23  |  0.92    Ca    8.8      19 Dec 2022 05:38  Phos  2.9     12-19  Mg     2.4     12-19    TPro  7.3  /  Alb  4.0  /  TBili  0.5  /  DBili  x   /  AST  35  /  ALT  43  /  AlkPhos  76  12-18    Lactate 1.3           12-19 @ 08:19                       Patient is a 52y old  Male who presents with a chief complaint of STEMI (18 Dec 2022 22:52)    24 hour events: denies CP, SOB, palpitations today     PAST MEDICAL & SURGICAL HISTORY:  Calculus of ureter      CVA (cerebral vascular accident)      TIA (transient ischemic attack)      HTN (hypertension)      HLD (hyperlipidemia)      Kidney stone      Gall stone      Status post cholecystectomy      Status post appendectomy      S/P tonsillectomy      History of lithotripsy          Allergies    penicillin (Rash)    Intolerances      REVIEW OF SYSTEMS: SEE BELOW       ICU Vital Signs Last 24 Hrs  T(C): 36.2 (19 Dec 2022 05:40), Max: 37.2 (18 Dec 2022 20:45)  T(F): 97.2 (19 Dec 2022 05:40), Max: 98.9 (18 Dec 2022 20:45)  HR: 68 (19 Dec 2022 08:00) (63 - 83)  BP: 122/82 (19 Dec 2022 08:00) (89/44 - 140/98)  BP(mean): 92 (19 Dec 2022 08:00) (45 - 107)  ABP: --  ABP(mean): --  RR: 12 (19 Dec 2022 08:00) (12 - 27)  SpO2: 99% (19 Dec 2022 08:00) (96% - 100%)    O2 Parameters below as of 19 Dec 2022 08:00  Patient On (Oxygen Delivery Method): room air            CAPILLARY BLOOD GLUCOSE          I&O's Summary    18 Dec 2022 07:01  -  19 Dec 2022 07:00  --------------------------------------------------------  IN: 300 mL / OUT: 425 mL / NET: -125 mL            MEDICATIONS  (STANDING):  aspirin  chewable 81 milliGRAM(s) Oral daily  atorvastatin 40 milliGRAM(s) Oral at bedtime  coronavirus bivalent (EUA) Booster Vaccine (PFIZER) 0.3 milliLiter(s) IntraMuscular once  lactated ringers. 1000 milliLiter(s) (50 mL/Hr) IV Continuous <Continuous>  metoprolol tartrate 12.5 milliGRAM(s) Oral two times a day  ticagrelor 90 milliGRAM(s) Oral every 12 hours      MEDICATIONS  (PRN):      PHYSICAL EXAM: SEE BELOW                          15.2   10.82 )-----------( 230      ( 19 Dec 2022 05:38 )             44.2       12-19    142  |  110<H>  |  21  ----------------------------<  126<H>  3.8   |  23  |  0.92    Ca    8.8      19 Dec 2022 05:38  Phos  2.9     12-19  Mg     2.4     12-19    TPro  7.3  /  Alb  4.0  /  TBili  0.5  /  DBili  x   /  AST  35  /  ALT  43  /  AlkPhos  76  12-18    Lactate 1.3           12-19 @ 08:19

## 2022-12-19 NOTE — PROGRESS NOTE ADULT - ASSESSMENT
STEMI   Multivessel CAD on cath, s/p LYLE to prox LAD   EF 30%  Check ECHO   Aspirin, Brilinta   Statin, low dose BB  Staged PCI 52M PMH HTN, active smoker, here with     STEMI     Multivessel CAD on cath, s/p LYLE to prox LAD       Plan:     Aspirin, Brilinta   Statin, low dose BB  Check ECHO   Staged PCI  DVT ppx with sc heparin     Counseled on smoking risks and advised cessation, he agrees     Wife updated at bedside

## 2022-12-20 LAB
ANION GAP SERPL CALC-SCNC: 7 MMOL/L — SIGNIFICANT CHANGE UP (ref 5–17)
BUN SERPL-MCNC: 17 MG/DL — SIGNIFICANT CHANGE UP (ref 7–23)
CALCIUM SERPL-MCNC: 9 MG/DL — SIGNIFICANT CHANGE UP (ref 8.5–10.1)
CHLORIDE SERPL-SCNC: 107 MMOL/L — SIGNIFICANT CHANGE UP (ref 96–108)
CO2 SERPL-SCNC: 26 MMOL/L — SIGNIFICANT CHANGE UP (ref 22–31)
CREAT SERPL-MCNC: 0.97 MG/DL — SIGNIFICANT CHANGE UP (ref 0.5–1.3)
EGFR: 93 ML/MIN/1.73M2 — SIGNIFICANT CHANGE UP
GLUCOSE SERPL-MCNC: 109 MG/DL — HIGH (ref 70–99)
HCT VFR BLD CALC: 43.3 % — SIGNIFICANT CHANGE UP (ref 39–50)
HGB BLD-MCNC: 15 G/DL — SIGNIFICANT CHANGE UP (ref 13–17)
LIDOCAIN SERPL-MCNC: 14.8 NMOL/L — SIGNIFICANT CHANGE UP
MCHC RBC-ENTMCNC: 30.1 PG — SIGNIFICANT CHANGE UP (ref 27–34)
MCHC RBC-ENTMCNC: 34.6 GM/DL — SIGNIFICANT CHANGE UP (ref 32–36)
MCV RBC AUTO: 86.8 FL — SIGNIFICANT CHANGE UP (ref 80–100)
PLATELET # BLD AUTO: 198 K/UL — SIGNIFICANT CHANGE UP (ref 150–400)
POTASSIUM SERPL-MCNC: 4.1 MMOL/L — SIGNIFICANT CHANGE UP (ref 3.5–5.3)
POTASSIUM SERPL-SCNC: 4.1 MMOL/L — SIGNIFICANT CHANGE UP (ref 3.5–5.3)
RBC # BLD: 4.99 M/UL — SIGNIFICANT CHANGE UP (ref 4.2–5.8)
RBC # FLD: 13.2 % — SIGNIFICANT CHANGE UP (ref 10.3–14.5)
SODIUM SERPL-SCNC: 140 MMOL/L — SIGNIFICANT CHANGE UP (ref 135–145)
TROPONIN I, HIGH SENSITIVITY RESULT: HIGH NG/L
WBC # BLD: 10.97 K/UL — HIGH (ref 3.8–10.5)
WBC # FLD AUTO: 10.97 K/UL — HIGH (ref 3.8–10.5)

## 2022-12-20 PROCEDURE — 92928 PRQ TCAT PLMT NTRAC ST 1 LES: CPT | Mod: LC

## 2022-12-20 PROCEDURE — 93010 ELECTROCARDIOGRAM REPORT: CPT

## 2022-12-20 PROCEDURE — 99291 CRITICAL CARE FIRST HOUR: CPT

## 2022-12-20 PROCEDURE — 92978 ENDOLUMINL IVUS OCT C 1ST: CPT | Mod: 26,LC

## 2022-12-20 PROCEDURE — 99232 SBSQ HOSP IP/OBS MODERATE 35: CPT

## 2022-12-20 RX ORDER — OXYCODONE HYDROCHLORIDE 5 MG/1
5 TABLET ORAL ONCE
Refills: 0 | Status: DISCONTINUED | OUTPATIENT
Start: 2022-12-20 | End: 2022-12-20

## 2022-12-20 RX ORDER — ACETAMINOPHEN 500 MG
1000 TABLET ORAL ONCE
Refills: 0 | Status: COMPLETED | OUTPATIENT
Start: 2022-12-20 | End: 2022-12-20

## 2022-12-20 RX ORDER — SODIUM CHLORIDE 9 MG/ML
250 INJECTION INTRAMUSCULAR; INTRAVENOUS; SUBCUTANEOUS ONCE
Refills: 0 | Status: COMPLETED | OUTPATIENT
Start: 2022-12-20 | End: 2022-12-20

## 2022-12-20 RX ORDER — LOSARTAN POTASSIUM 100 MG/1
25 TABLET, FILM COATED ORAL DAILY
Refills: 0 | Status: DISCONTINUED | OUTPATIENT
Start: 2022-12-20 | End: 2022-12-21

## 2022-12-20 RX ORDER — OXYCODONE AND ACETAMINOPHEN 5; 325 MG/1; MG/1
1 TABLET ORAL ONCE
Refills: 0 | Status: DISCONTINUED | OUTPATIENT
Start: 2022-12-20 | End: 2022-12-20

## 2022-12-20 RX ORDER — SODIUM CHLORIDE 9 MG/ML
1000 INJECTION INTRAMUSCULAR; INTRAVENOUS; SUBCUTANEOUS
Refills: 0 | Status: DISCONTINUED | OUTPATIENT
Start: 2022-12-20 | End: 2022-12-21

## 2022-12-20 RX ADMIN — LOSARTAN POTASSIUM 25 MILLIGRAM(S): 100 TABLET, FILM COATED ORAL at 12:51

## 2022-12-20 RX ADMIN — OXYCODONE HYDROCHLORIDE 5 MILLIGRAM(S): 5 TABLET ORAL at 22:19

## 2022-12-20 RX ADMIN — SODIUM CHLORIDE 190 MILLILITER(S): 9 INJECTION INTRAMUSCULAR; INTRAVENOUS; SUBCUTANEOUS at 13:04

## 2022-12-20 RX ADMIN — TICAGRELOR 90 MILLIGRAM(S): 90 TABLET ORAL at 12:06

## 2022-12-20 RX ADMIN — HEPARIN SODIUM 5000 UNIT(S): 5000 INJECTION INTRAVENOUS; SUBCUTANEOUS at 15:39

## 2022-12-20 RX ADMIN — OXYCODONE HYDROCHLORIDE 5 MILLIGRAM(S): 5 TABLET ORAL at 16:39

## 2022-12-20 RX ADMIN — Medication 12.5 MILLIGRAM(S): at 22:18

## 2022-12-20 RX ADMIN — OXYCODONE HYDROCHLORIDE 5 MILLIGRAM(S): 5 TABLET ORAL at 15:39

## 2022-12-20 RX ADMIN — Medication 81 MILLIGRAM(S): at 12:07

## 2022-12-20 RX ADMIN — Medication 1000 MILLIGRAM(S): at 13:39

## 2022-12-20 RX ADMIN — OXYCODONE HYDROCHLORIDE 5 MILLIGRAM(S): 5 TABLET ORAL at 23:15

## 2022-12-20 RX ADMIN — SODIUM CHLORIDE 500 MILLILITER(S): 9 INJECTION INTRAMUSCULAR; INTRAVENOUS; SUBCUTANEOUS at 10:02

## 2022-12-20 RX ADMIN — ATORVASTATIN CALCIUM 80 MILLIGRAM(S): 80 TABLET, FILM COATED ORAL at 21:12

## 2022-12-20 RX ADMIN — Medication 400 MILLIGRAM(S): at 13:27

## 2022-12-20 RX ADMIN — HEPARIN SODIUM 5000 UNIT(S): 5000 INJECTION INTRAVENOUS; SUBCUTANEOUS at 21:11

## 2022-12-20 RX ADMIN — TICAGRELOR 90 MILLIGRAM(S): 90 TABLET ORAL at 21:12

## 2022-12-20 NOTE — CHART NOTE - NSCHARTNOTEFT_GEN_A_CORE
[facility] risks, benefits and alternatives discussed with patient. Risk discussed included, but not limited to MI, stroke, mortality, major bleeding, arrythmia, or infection. Consent obtained and signed educational material provided. Pt. verbalizes and understands pre-procedural instructions.  ASA: II  Bleeding Risk Score:  Creatinine: 0.92  GFR:  99  Ray Score: JUVENAL risk score 1 point   Pt. pre-hydrated with 250cc bolus IV x1 [facility] risks, benefits and alternatives discussed with patient. Risk discussed included, but not limited to MI, stroke, mortality, major bleeding, arrythmia, or infection. Consent obtained and signed educational material provided. Pt. verbalizes and understands pre-procedural instructions.  ASA: II  Bleeding Risk Score: 0.7  Creatinine: 0.92  GFR:  99  Ray Score: JUVENAL risk score 1 point   Pt. pre-hydrated with 250cc bolus IV x1

## 2022-12-20 NOTE — PROGRESS NOTE ADULT - SUBJECTIVE AND OBJECTIVE BOX
Patient is a 52y old  Male who presents with a chief complaint of STEMI (20 Dec 2022 11:20)    24 hour events: no CP today     PAST MEDICAL & SURGICAL HISTORY:  Calculus of ureter      CVA (cerebral vascular accident)      TIA (transient ischemic attack)      HTN (hypertension)      HLD (hyperlipidemia)      Kidney stone      Gall stone      Status post cholecystectomy      Status post appendectomy      S/P tonsillectomy      History of lithotripsy          Allergies    penicillin (Rash)    Intolerances      REVIEW OF SYSTEMS: SEE BELOW       ICU Vital Signs Last 24 Hrs  T(C): 36.9 (20 Dec 2022 09:47), Max: 37.1 (20 Dec 2022 04:00)  T(F): 98.5 (20 Dec 2022 09:47), Max: 98.7 (20 Dec 2022 04:00)  HR: 75 (20 Dec 2022 09:47) (64 - 78)  BP: 131/80 (20 Dec 2022 09:47) (95/66 - 131/80)  BP(mean): 72 (20 Dec 2022 09:00) (65 - 92)  ABP: --  ABP(mean): --  RR: 16 (20 Dec 2022 09:47) (13 - 27)  SpO2: 100% (20 Dec 2022 09:47) (92% - 100%)    O2 Parameters below as of 20 Dec 2022 09:47  Patient On (Oxygen Delivery Method): room air            CAPILLARY BLOOD GLUCOSE          I&O's Summary    19 Dec 2022 07:01  -  20 Dec 2022 07:00  --------------------------------------------------------  IN: 0 mL / OUT: 575 mL / NET: -575 mL    20 Dec 2022 07:01  -  20 Dec 2022 11:27  --------------------------------------------------------  IN: 250 mL / OUT: 0 mL / NET: 250 mL            MEDICATIONS  (STANDING):  aspirin  chewable 81 milliGRAM(s) Oral daily  atorvastatin 80 milliGRAM(s) Oral at bedtime  coronavirus bivalent (EUA) Booster Vaccine (PFIZER) 0.3 milliLiter(s) IntraMuscular once  heparin   Injectable 5000 Unit(s) SubCutaneous every 8 hours  lactated ringers. 1000 milliLiter(s) (50 mL/Hr) IV Continuous <Continuous>  losartan 25 milliGRAM(s) Oral daily  metoprolol tartrate 12.5 milliGRAM(s) Oral two times a day  sodium chloride 0.9%. 1000 milliLiter(s) (190 mL/Hr) IV Continuous <Continuous>  ticagrelor 90 milliGRAM(s) Oral every 12 hours      MEDICATIONS  (PRN):      PHYSICAL EXAM: SEE BELOW                          15.0   10.97 )-----------( 198      ( 20 Dec 2022 05:51 )             43.3       12-20    140  |  107  |  17  ----------------------------<  109<H>  4.1   |  26  |  0.97    Ca    9.0      20 Dec 2022 05:51  Phos  2.9     12-19  Mg     2.4     12-19    TPro  7.3  /  Alb  4.0  /  TBili  0.5  /  DBili  x   /  AST  35  /  ALT  43  /  AlkPhos  76  12-18

## 2022-12-20 NOTE — CHART NOTE - NSCHARTNOTEFT_GEN_A_CORE
RT. radial band removed with out complication. Site benign soft no bleeding no hematoma +2 radial pulse, pressure dressing with gauze applied to site no c/o numbness/tingling, <3sec cap refill, fingers/hand warm to touch.  Pt. reports right upper forearm pain no swelling noted warm compress applied to site, IV Tylenol ordered for pain.  -Continue to monitor site

## 2022-12-20 NOTE — PROGRESS NOTE ADULT - ASSESSMENT
52M PMH HTN, active smoker, here with     STEMI     Multivessel CAD on cath, s/p LYLE to prox LAD       Plan:     Aspirin, Brilinta   Statin, low dose BB  ECHO shows EF 40%, multiple WMAs  Staged PCI today   DVT ppx with sc heparin

## 2022-12-20 NOTE — CHART NOTE - NSCHARTNOTEFT_GEN_A_CORE
Patient is a 52y old  Male s/p staged PCI    Patient status post cath via RRA. Site clean, dry, intact. Pulses present, capillary refill appropriate.  no signs of hematoma present, surrounding area soft. VSS no c/o pain at this time. Patient resting comfortably in bed

## 2022-12-20 NOTE — PROGRESS NOTE ADULT - SUBJECTIVE AND OBJECTIVE BOX
Nurse Practitioner Progress note:     HPI:  52 year old male PMHx HTN, HLD, CVA/TIA  Presents to ED tonight with Non-radiating Substernal 8/10 Chest pain after walking up stairs.  Associated with SOB, Diaphoresis and Vomiting.  Patient describes episodes of self resolving Cp over the past 2 weeks.  1st episode during Elipitical use.  Underwent Emergent Cardiac cath with 1 LYLE  to proximal LAD.  Pain free on exam.  (18 Dec 2022 22:52)  Pt. now here for staged PCI      T(C): 36.9 (12-20-22 @ 09:47), Max: 37.1 (12-20-22 @ 04:00)  HR: 75 (12-20-22 @ 09:47) (64 - 78)  BP: 131/80 (12-20-22 @ 09:47) (95/66 - 131/80)  RR: 16 (12-20-22 @ 09:47) (13 - 27)  SpO2: 100% (12-20-22 @ 09:47) (92% - 100%)  Wt(kg): --    PHYSICAL EXAM:  Neurologic: Non-focal, AxOx3.  No neuro deficits  Vascular: Peripheral pulses palpable 2+ bilaterally  Procedure Site: RT. radial band in place no c/o numbness/tingling, <3sec cap refill fingers hand warm to touch no +1 radial pulse    12 lead EKG:  	    LABS:	 	                        15.0   10.97 )-----------( 198      ( 20 Dec 2022 05:51 )             43.3   12-20    140  |  107  |  17  ----------------------------<  109<H>  4.1   |  26  |  0.97    Ca    9.0      20 Dec 2022 05:51  Phos  2.9     12-19  Mg     2.4     12-19    TPro  7.3  /  Alb  4.0  /  TBili  0.5  /  DBili  x   /  AST  35  /  ALT  43  /  AlkPhos  76  12-18        PROCEDURE RESULTS:  S/P LYLE to OM1          ASSESSMENT/PLAN: 	  52 year old male PMHx HTN, HLD, CVA/TIA  Presents to ED tonight with Non-radiating Substernal 8/10 Chest pain after walking up stairs.  Associated with SOB, Diaphoresis and Vomiting.  Patient describes episodes of self resolving Cp over the past 2 weeks.  1st episode during Elipitical use.  Underwent Emergent Cardiac cath with 1 LYLE  to proximal LAD.  Pain free on exam.  (18 Dec 2022 22:52)  S/P staged PCI to OM1     -Continue CCU monitoring   -VS, labs, diet, activity as per PCI orders  -IV hydration  -Encourage PO fluids  -ASA 81mg  -Brilinta 90mg BID  -Losartan 25mg PO first dose now   -Lopressor 12.5mg BID   -Lipitor 80mg   -Plan of care D/W pt. and MD  -Discussed therapeutic lifestyle changes to reduce risk factors such as following a cardiac diet, weight loss, maintaining a healthy weight, exercise, smoking cessation, medication compliance, and regular follow-up  with MD to know our numbers (BP, cholesterol, weight, and glucose  - Follow-up AM labs/EKG/site check  -Follow-up with attending/cardiologist

## 2022-12-20 NOTE — PROGRESS NOTE ADULT - CRITICAL CARE ATTENDING COMMENT
Now s/p complete revascularization with both STEMI LAD and Cx PCI. Improved with GDMT still being uptitrated. Close cardiology follow up.     Thank you for allowing me to participate in the care of your patient. Feel free to contact me if any clinical issues arise via contact information below or MS Teams.    Surya Reed MD, FACC, [facility]   Director, Interventional Cardiology, [address], [address] [place] [address], [address] Office: [phone]  [place] Office: [phone]  Email: [email or web address]
52 year old man with obesity, HTN, HLD  (on statin), active smoker who presented with chest pain, now s/p STEMI LAD revascularized with Cx pending. Slow intitation of GDMT for HF. Will need close cardiology follow up.     Thank you for allowing me to participate in the care of your patient. Feel free to contact me if any clinical issues arise via contact information below or MS Teams.    Surya Reed MD, FACC, [facility]   Director, Interventional Cardiology, [address], [facility] Floor, [address] Office: [phone]  [place] Office: [phone]  Email: [email or web address]

## 2022-12-21 ENCOUNTER — TRANSCRIPTION ENCOUNTER (OUTPATIENT)
Age: 52
End: 2022-12-21

## 2022-12-21 VITALS — HEART RATE: 89 BPM

## 2022-12-21 LAB
ANION GAP SERPL CALC-SCNC: 7 MMOL/L — SIGNIFICANT CHANGE UP (ref 5–17)
BUN SERPL-MCNC: 18 MG/DL — SIGNIFICANT CHANGE UP (ref 7–23)
CALCIUM SERPL-MCNC: 8.5 MG/DL — SIGNIFICANT CHANGE UP (ref 8.5–10.1)
CHLORIDE SERPL-SCNC: 109 MMOL/L — HIGH (ref 96–108)
CO2 SERPL-SCNC: 24 MMOL/L — SIGNIFICANT CHANGE UP (ref 22–31)
CREAT SERPL-MCNC: 0.86 MG/DL — SIGNIFICANT CHANGE UP (ref 0.5–1.3)
EGFR: 103 ML/MIN/1.73M2 — SIGNIFICANT CHANGE UP
GLUCOSE SERPL-MCNC: 103 MG/DL — HIGH (ref 70–99)
HCT VFR BLD CALC: 39.3 % — SIGNIFICANT CHANGE UP (ref 39–50)
HGB BLD-MCNC: 13.7 G/DL — SIGNIFICANT CHANGE UP (ref 13–17)
MAGNESIUM SERPL-MCNC: 2.3 MG/DL — SIGNIFICANT CHANGE UP (ref 1.6–2.6)
MCHC RBC-ENTMCNC: 30 PG — SIGNIFICANT CHANGE UP (ref 27–34)
MCHC RBC-ENTMCNC: 34.9 GM/DL — SIGNIFICANT CHANGE UP (ref 32–36)
MCV RBC AUTO: 86.2 FL — SIGNIFICANT CHANGE UP (ref 80–100)
PHOSPHATE SERPL-MCNC: 3.5 MG/DL — SIGNIFICANT CHANGE UP (ref 2.5–4.5)
PLATELET # BLD AUTO: 177 K/UL — SIGNIFICANT CHANGE UP (ref 150–400)
POTASSIUM SERPL-MCNC: 4 MMOL/L — SIGNIFICANT CHANGE UP (ref 3.5–5.3)
POTASSIUM SERPL-SCNC: 4 MMOL/L — SIGNIFICANT CHANGE UP (ref 3.5–5.3)
RBC # BLD: 4.56 M/UL — SIGNIFICANT CHANGE UP (ref 4.2–5.8)
RBC # FLD: 13.2 % — SIGNIFICANT CHANGE UP (ref 10.3–14.5)
SODIUM SERPL-SCNC: 140 MMOL/L — SIGNIFICANT CHANGE UP (ref 135–145)
TROPONIN I, HIGH SENSITIVITY RESULT: HIGH NG/L
WBC # BLD: 10.34 K/UL — SIGNIFICANT CHANGE UP (ref 3.8–10.5)
WBC # FLD AUTO: 10.34 K/UL — SIGNIFICANT CHANGE UP (ref 3.8–10.5)

## 2022-12-21 PROCEDURE — 99239 HOSP IP/OBS DSCHRG MGMT >30: CPT | Mod: 25

## 2022-12-21 PROCEDURE — 99238 HOSP IP/OBS DSCHRG MGMT 30/<: CPT

## 2022-12-21 RX ORDER — ASPIRIN/CALCIUM CARB/MAGNESIUM 324 MG
1 TABLET ORAL
Qty: 30 | Refills: 0
Start: 2022-12-21 | End: 2023-01-19

## 2022-12-21 RX ORDER — TICAGRELOR 90 MG/1
1 TABLET ORAL
Qty: 0 | Refills: 0 | DISCHARGE
Start: 2022-12-21

## 2022-12-21 RX ORDER — TICAGRELOR 90 MG/1
1 TABLET ORAL
Qty: 60 | Refills: 0
Start: 2022-12-21 | End: 2023-01-19

## 2022-12-21 RX ORDER — METOPROLOL TARTRATE 50 MG
25 TABLET ORAL DAILY
Refills: 0 | Status: DISCONTINUED | OUTPATIENT
Start: 2022-12-21 | End: 2022-12-21

## 2022-12-21 RX ORDER — ROSUVASTATIN CALCIUM 5 MG/1
1 TABLET ORAL
Qty: 30 | Refills: 0
Start: 2022-12-21 | End: 2023-01-19

## 2022-12-21 RX ORDER — LOSARTAN POTASSIUM 100 MG/1
1 TABLET, FILM COATED ORAL
Qty: 30 | Refills: 0
Start: 2022-12-21 | End: 2023-01-19

## 2022-12-21 RX ORDER — METOPROLOL TARTRATE 50 MG
12.5 TABLET ORAL DAILY
Refills: 0 | Status: DISCONTINUED | OUTPATIENT
Start: 2022-12-22 | End: 2022-12-21

## 2022-12-21 RX ORDER — AMLODIPINE BESYLATE 2.5 MG/1
1 TABLET ORAL
Qty: 0 | Refills: 0 | DISCHARGE

## 2022-12-21 RX ORDER — LOSARTAN POTASSIUM 100 MG/1
1 TABLET, FILM COATED ORAL
Qty: 0 | Refills: 0 | DISCHARGE
Start: 2022-12-21

## 2022-12-21 RX ORDER — ROSUVASTATIN CALCIUM 5 MG/1
1 TABLET ORAL
Qty: 0 | Refills: 0 | DISCHARGE

## 2022-12-21 RX ORDER — LOSARTAN POTASSIUM 100 MG/1
1 TABLET, FILM COATED ORAL
Qty: 0 | Refills: 0 | DISCHARGE

## 2022-12-21 RX ORDER — ASPIRIN/CALCIUM CARB/MAGNESIUM 324 MG
1 TABLET ORAL
Qty: 0 | Refills: 0 | DISCHARGE
Start: 2022-12-21

## 2022-12-21 RX ORDER — METOPROLOL TARTRATE 50 MG
0.5 TABLET ORAL
Qty: 15 | Refills: 0
Start: 2022-12-21 | End: 2023-01-19

## 2022-12-21 RX ORDER — METOPROLOL TARTRATE 50 MG
12.5 TABLET ORAL
Qty: 0 | Refills: 0 | DISCHARGE
Start: 2022-12-21

## 2022-12-21 RX ADMIN — TICAGRELOR 90 MILLIGRAM(S): 90 TABLET ORAL at 10:33

## 2022-12-21 RX ADMIN — Medication 25 MILLIGRAM(S): at 10:38

## 2022-12-21 RX ADMIN — LOSARTAN POTASSIUM 25 MILLIGRAM(S): 100 TABLET, FILM COATED ORAL at 10:33

## 2022-12-21 RX ADMIN — HEPARIN SODIUM 5000 UNIT(S): 5000 INJECTION INTRAVENOUS; SUBCUTANEOUS at 06:25

## 2022-12-21 RX ADMIN — Medication 25 MILLIGRAM(S): at 11:09

## 2022-12-21 RX ADMIN — Medication 81 MILLIGRAM(S): at 10:33

## 2022-12-21 NOTE — DISCHARGE NOTE NURSING/CASE MANAGEMENT/SOCIAL WORK - NSDCPEWEB_GEN_ALL_CORE
[facility] for Tobacco Control website --- [email or web address] website --- [email or web address]

## 2022-12-21 NOTE — DISCHARGE NOTE PROVIDER - HOSPITAL COURSE
NP Progress Note     Follow Up:  Consult    HPI:  52 year old male PMHx HTN, HLD, CVA/TIA  Presents to ED tonight with Non-radiating Substernal 8/10 Chest pain after walking up stairs.  Associated with SOB, Diaphoresis and Vomiting.  Patient describes episodes of self resolving Cp over the past 2 weeks.  1st episode during Elipitical use.  Underwent Emergent Cardiac cath with 1 LYLE  to proximal LAD.  Pain free on exam.  (18 Dec 2022 22:52)    Subjective/Observations: Pt. seen and examined and evaluated. Pt. resting comfortably in bed in NAD, with no respiratory distress, no chest pain, dyspnea, palpitations, PND, or orthopnea.    REVIEW OF SYSTEMS: All other review of systems is negative unless indicated above    PAST MEDICAL & SURGICAL HISTORY:  Calculus of ureter      MEDICATIONS  (STANDING):  aspirin  chewable 81 milliGRAM(s) Oral daily  atorvastatin 80 milliGRAM(s) Oral at bedtime  coronavirus bivalent (EUA) Booster Vaccine (PFIZER) 0.3 milliLiter(s) IntraMuscular once  heparin   Injectable 5000 Unit(s) SubCutaneous every 8 hours  losartan 25 milliGRAM(s) Oral daily  ticagrelor 90 milliGRAM(s) Oral every 12 hours    MEDICATIONS  (PRN):      Allergies: penicillin (Rash)    Vital Signs Last 24 Hrs  T(C): 36.7 (21 Dec 2022 10:00), Max: 37 (21 Dec 2022 00:00)  T(F): 98.1 (21 Dec 2022 10:00), Max: 98.6 (21 Dec 2022 00:00)  HR: 104 (21 Dec 2022 12:00) (67 - 104)  BP: 123/80 (21 Dec 2022 12:00) (82/55 - 123/80)  BP(mean): 86 (21 Dec 2022 12:00) (61 - 87)  RR: 16 (21 Dec 2022 11:25) (13 - 31)  SpO2: 98% (21 Dec 2022 08:00) (95% - 100%)    Parameters below as of 21 Dec 2022 04:00  Patient On (Oxygen Delivery Method): room air        I&O's Summary    20 Dec 2022 07:01  -  21 Dec 2022 07:00  --------------------------------------------------------  IN: 2190 mL / OUT: 1000 mL / NET: 1190 mL              LABS: All Labs Reviewed:   52 year old male PMHx HTN, HLD, CVA/TIA  Presents to ED tonight with Non-radiating Substernal 8/10 Chest pain after walking up stairs.  Associated with SOB, Diaphoresis and Vomiting.  Patient describes episodes of self resolving Cp over the past 2 weeks.  1st episode during Elipitical use.  Underwent Emergent Cardiac cath with 1 LYLE  to proximal LAD.  Pain free on exam.  (18 Dec 2022 22:52)    Subjective/Observations: Pt. seen and examined and evaluated. Pt. resting comfortably in bed in NAD, with no respiratory distress, no chest pain, dyspnea, palpitations, PND, or orthopnea.    REVIEW OF SYSTEMS: All other review of systems is negative unless indicated above    MEDICATIONS  (STANDING):  aspirin  chewable 81 milliGRAM(s) Oral daily  atorvastatin 80 milliGRAM(s) Oral at bedtime  coronavirus bivalent (EUA) Booster Vaccine (PFIZER) 0.3 milliLiter(s) IntraMuscular once  heparin   Injectable 5000 Unit(s) SubCutaneous every 8 hours  losartan 25 milliGRAM(s) Oral daily  ticagrelor 90 milliGRAM(s) Oral every 12 hours    Allergies: penicillin (Rash)    Vital Signs Last 24 Hrs  T(C): 36.7 (21 Dec 2022 10:00), Max: 37 (21 Dec 2022 00:00)  T(F): 98.1 (21 Dec 2022 10:00), Max: 98.6 (21 Dec 2022 00:00)  HR: 104 (21 Dec 2022 12:00) (67 - 104)  BP: 123/80 (21 Dec 2022 12:00) (82/55 - 123/80)  BP(mean): 86 (21 Dec 2022 12:00) (61 - 87)  RR: 16 (21 Dec 2022 11:25) (13 - 31)  SpO2: 98% (21 Dec 2022 08:00) (95% - 100%)    Parameters below as of 21 Dec 2022 04:00  Patient On (Oxygen Delivery Method): room air    I&O's Summary    20 Dec 2022 07:01  -  21 Dec 2022 07:00  --------------------------------------------------------  IN: 2190 mL / OUT: 1000 mL / NET: 1190 mL       LABS: All Labs Reviewed:                        13.7   10.34 )-----------( 177      ( 21 Dec 2022 06:25 )             39.3   12-21    140  |  109<H>  |  18  ----------------------------<  103<H>  4.0   |  24  |  0.86    Ca    8.5      21 Dec 2022 06:25  Phos  3.5     12-21  Mg     2.3     12-21    Echo:  < from: TTE Echo Complete w/o Contrast w/ Doppler (12.19.22 @ 11:47) >   Impression   Summary   Left ventricle systolic function appears moderately impaired; There is   severe hypokinesis of the mid to distal anterior and anteroseptal , and   apical segments. Remaining segments exhibit normal contractility.   Estimated left ventricular ejection fraction is 40 %.   The aortic valve is well visualized, appears calcified. Valve opening   seems to be normal.   Trace aortic regurgitation is present.   Normal appearing mitral valve structure.   Trace to mild mitral regurgitation is present.   Normal appearing tricuspid valve structure.   Trace tricuspid valve regurgitation is present.    Cath:< from: Cardiac Catheterization (12.18.22 @ 21:01) >     Interventional Conclusions     1. Successful LYLE placement to proximal LAD with IVUS used to guide post   dilation and stent optimization.   2. Heavily calcified vessel seen diffusely on IVUS with acceptable   proximal and distal plaque burden.   3. LVEDP post revascularization 19.  S/P LYLE  OM1    EKG:     Interpretation of Telemetry:      Physical Exam:  Appearance: [ ] Normal  [ ] abnormal [X ] NAD   Eyes: [ ] PERRL [ ] EOMI  HEENT: [ ] Normal [ ] Abnormal oral mucosa [ ]NC/AT  Cardiovascular: [X ] S1 [ X] S2 [ ] RRR [ ] m/r/g [ ]edema [ ] JVP  Procedural Access Site: [X] RT. radial pressure dressing removed site benign soft no bleeding no hematoma bandaid applied no c/p numbness/tingling, < 3sec cap refill, fingers/hand +1 radial pulse   Respiratory: [ X] Clear to auscultation bilaterally  Gastrointestinal: [ ] Soft [ ] tenderness[ ] distension [ ] BS  Musculoskeletal: [ ] clubbing [ ] joint deformity   Neurologic: [ ] Non-focal  Lymphatic: [ ] lymphadenopathy  Psychiatry: [X ] AAOx3  [ ] confused [ ] disoriented [ ] Mood & affect appropriate  Skin: [ ]  rashes [ ] ecchymoses [ ] cyanosis   Impression

## 2022-12-21 NOTE — DISCHARGE NOTE PROVIDER - CARE PROVIDER_API CALL
Surya Reed)  Cardiology; Internal Medicine; Interventional Cardiology  [address], [address]  [place], [place] [address]  Phone: [phone]  Fax: [phone]  Follow Up Time:

## 2022-12-21 NOTE — DISCHARGE NOTE NURSING/CASE MANAGEMENT/SOCIAL WORK - NSDCPEFALRISK_GEN_ALL_CORE
For information on Fall & Injury Prevention, visit: [email or web address] OR  [email or web address] OR  https://www.cdc.gov/steadi/patient.html

## 2022-12-21 NOTE — DISCHARGE NOTE PROVIDER - NSDCMRMEDTOKEN_GEN_ALL_CORE_FT
amLODIPine 10 mg oral tablet: 1 tab(s) orally once a day  losartan 25 mg oral tablet: 1 tab(s) orally once a day  rosuvastatin 20 mg oral tablet: 1 tab(s) orally once a day   amLODIPine 10 mg oral tablet: 1 tab(s) orally once a day  aspirin 81 mg oral tablet, chewable: 1 tab(s) orally once a day  losartan 25 mg oral tablet: 1 tab(s) orally once a day  metoprolol: 12.5 milligram(s) orally 2 times a day  rosuvastatin 20 mg oral tablet: 1 tab(s) orally once a day  ticagrelor 90 mg oral tablet: 1 tab(s) orally every 12 hours   aspirin 81 mg oral tablet, chewable: 1 tab(s) orally once a day  losartan 25 mg oral tablet: 1 tab(s) orally once a day  rosuvastatin 20 mg oral tablet: 1 tab(s) orally once a day  ticagrelor 90 mg oral tablet: 1 tab(s) orally every 12 hours  Toprol-XL 25 mg oral tablet, extended release: 0.5 tab(s) orally once a day

## 2022-12-21 NOTE — DISCHARGE NOTE PROVIDER - NSDCFUSCHEDAPPT_GEN_ALL_CORE_FT
Surya Reed  [facility]  CARDIOLOGY [address]  Scheduled Appointment: 01/03/2023

## 2022-12-21 NOTE — DISCHARGE NOTE PROVIDER - ATTENDING DISCHARGE PHYSICAL EXAMINATION:
Unremarkable exam with no wrist swelling. Plan for low dose BB and low dose ARB. Close follow up with cardiology.

## 2022-12-21 NOTE — DISCHARGE NOTE NURSING/CASE MANAGEMENT/SOCIAL WORK - NSDCPEEMAIL_GEN_ALL_CORE
[facility] for Tobacco Control email [email or web address]

## 2022-12-21 NOTE — PROGRESS NOTE ADULT - RESPIRATORY
clear to auscultation bilaterally/no wheezes/no respiratory distress
clear to auscultation bilaterally/no wheezes/no respiratory distress
clear to auscultation bilaterally/no wheezes/no respiratory distress/no use of accessory muscles

## 2022-12-21 NOTE — PROGRESS NOTE ADULT - NEUROLOGICAL
cranial nerves II-XII intact/sensation intact/responds to pain/responds to verbal commands/no spontaneous movement negative

## 2022-12-21 NOTE — PROGRESS NOTE ADULT - SUBJECTIVE AND OBJECTIVE BOX
Patient is a 52y old  Male who presents with a chief complaint of STEMI (21 Dec 2022 12:36)    24 hour events: doing well   R wrist pain improving     PAST MEDICAL & SURGICAL HISTORY:  Calculus of ureter      CVA (cerebral vascular accident)      TIA (transient ischemic attack)      HTN (hypertension)      HLD (hyperlipidemia)      Kidney stone      Gall stone      Status post cholecystectomy      Status post appendectomy      S/P tonsillectomy      History of lithotripsy          Allergies    penicillin (Rash)    Intolerances      REVIEW OF SYSTEMS: SEE BELOW       ICU Vital Signs Last 24 Hrs  T(C): 36.7 (21 Dec 2022 10:00), Max: 37 (21 Dec 2022 00:00)  T(F): 98.1 (21 Dec 2022 10:00), Max: 98.6 (21 Dec 2022 00:00)  HR: 104 (21 Dec 2022 12:00) (67 - 104)  BP: 123/80 (21 Dec 2022 12:00) (82/55 - 123/80)  BP(mean): 86 (21 Dec 2022 12:00) (61 - 87)  ABP: --  ABP(mean): --  RR: 16 (21 Dec 2022 11:25) (13 - 31)  SpO2: 98% (21 Dec 2022 08:00) (95% - 100%)    O2 Parameters below as of 21 Dec 2022 04:00  Patient On (Oxygen Delivery Method): room air            CAPILLARY BLOOD GLUCOSE          I&O's Summary    20 Dec 2022 07:01  -  21 Dec 2022 07:00  --------------------------------------------------------  IN: 2190 mL / OUT: 1000 mL / NET: 1190 mL            MEDICATIONS  (STANDING):  aspirin  chewable 81 milliGRAM(s) Oral daily  atorvastatin 80 milliGRAM(s) Oral at bedtime  coronavirus bivalent (EUA) Booster Vaccine (PFIZER) 0.3 milliLiter(s) IntraMuscular once  heparin   Injectable 5000 Unit(s) SubCutaneous every 8 hours  losartan 25 milliGRAM(s) Oral daily  ticagrelor 90 milliGRAM(s) Oral every 12 hours      MEDICATIONS  (PRN):      PHYSICAL EXAM: SEE BELOW                          13.7   10.34 )-----------( 177      ( 21 Dec 2022 06:25 )             39.3       12-21    140  |  109<H>  |  18  ----------------------------<  103<H>  4.0   |  24  |  0.86    Ca    8.5      21 Dec 2022 06:25  Phos  3.5     12-21  Mg     2.3     12-21

## 2022-12-21 NOTE — PROGRESS NOTE ADULT - ASSESSMENT
52M PMH HTN, active smoker, here with     STEMI     Multivessel CAD on cath, s/p LYLE to prox LAD 12/18  Staged PCI with LYLE to OM1 12/20    Clinically stable       Plan:     Aspirin, Brilinta   Statin, change BB to Toprol XL  Low dose losartan   DVT ppx with sc heparin     Discharge home later today    Plan d/w cardiology

## 2022-12-21 NOTE — DISCHARGE NOTE NURSING/CASE MANAGEMENT/SOCIAL WORK - PATIENT PORTAL LINK FT
You can access the FollowMyHealth Patient Portal offered by [facility] by registering at the following website: [email or web address]. By joining "[employer]"’s FollowMyHealth portal, you will also be able to view your health information using other applications (apps) compatible with our system.

## 2023-01-03 ENCOUNTER — NON-APPOINTMENT (OUTPATIENT)
Age: 53
End: 2023-01-03

## 2023-01-03 ENCOUNTER — APPOINTMENT (OUTPATIENT)
Dept: CARDIOLOGY | Facility: CLINIC | Age: 53
End: 2023-01-03
Payer: COMMERCIAL

## 2023-01-03 VITALS
SYSTOLIC BLOOD PRESSURE: 128 MMHG | WEIGHT: 195 LBS | DIASTOLIC BLOOD PRESSURE: 81 MMHG | HEIGHT: 70 IN | OXYGEN SATURATION: 96 % | HEART RATE: 71 BPM | RESPIRATION RATE: 16 BRPM | BODY MASS INDEX: 27.92 KG/M2

## 2023-01-03 DIAGNOSIS — I25.10 ATHEROSCLEROTIC HEART DISEASE OF NATIVE CORONARY ARTERY W/OUT ANGINA PECTORIS: ICD-10-CM

## 2023-01-03 PROCEDURE — 93000 ELECTROCARDIOGRAM COMPLETE: CPT

## 2023-01-03 PROCEDURE — 99406 BEHAV CHNG SMOKING 3-10 MIN: CPT

## 2023-01-03 PROCEDURE — 99215 OFFICE O/P EST HI 40 MIN: CPT | Mod: 25

## 2023-01-03 RX ORDER — ROSUVASTATIN CALCIUM 20 MG/1
20 TABLET, FILM COATED ORAL DAILY
Qty: 90 | Refills: 3 | Status: DISCONTINUED | COMMUNITY
End: 2023-01-03

## 2023-01-03 RX ORDER — KRILL/OM-3/DHA/EPA/PHOSPHO/AST 1000-230MG
81 CAPSULE ORAL DAILY
Qty: 30 | Refills: 3 | Status: ACTIVE | COMMUNITY
Start: 2023-01-03 | End: 1900-01-01

## 2023-01-03 RX ORDER — AMLODIPINE BESYLATE 10 MG/1
10 TABLET ORAL
Refills: 0 | Status: DISCONTINUED | COMMUNITY
End: 2023-01-03

## 2023-01-03 RX ORDER — ASPIRIN 81 MG
81 TABLET, DELAYED RELEASE (ENTERIC COATED) ORAL DAILY
Qty: 30 | Refills: 3 | Status: DISCONTINUED | COMMUNITY
End: 2023-01-03

## 2023-01-03 RX ORDER — ROSUVASTATIN CALCIUM 10 MG/1
10 TABLET, FILM COATED ORAL
Refills: 0 | Status: DISCONTINUED | COMMUNITY
Start: 2018-02-15 | End: 2023-01-03

## 2023-01-17 ENCOUNTER — APPOINTMENT (OUTPATIENT)
Dept: CARDIOLOGY | Facility: CLINIC | Age: 53
End: 2023-01-17
Payer: COMMERCIAL

## 2023-01-17 VITALS
HEART RATE: 77 BPM | HEIGHT: 70 IN | BODY MASS INDEX: 31.64 KG/M2 | OXYGEN SATURATION: 100 % | SYSTOLIC BLOOD PRESSURE: 141 MMHG | DIASTOLIC BLOOD PRESSURE: 90 MMHG | WEIGHT: 221 LBS

## 2023-01-17 DIAGNOSIS — I50.40 UNSPECIFIED COMBINED SYSTOLIC (CONGESTIVE) AND DIASTOLIC (CONGESTIVE) HEART FAILURE: ICD-10-CM

## 2023-01-17 PROCEDURE — 99215 OFFICE O/P EST HI 40 MIN: CPT | Mod: 25

## 2023-01-17 PROCEDURE — 93000 ELECTROCARDIOGRAM COMPLETE: CPT

## 2023-01-17 RX ORDER — ASPRIN AND EXTENDED-RELEASE DIPYRIDAMOLE 25; 200 MG/1; MG/1
25-200 CAPSULE ORAL
Qty: 60 | Refills: 0 | Status: DISCONTINUED | COMMUNITY
Start: 2018-06-19 | End: 2023-01-17

## 2023-01-17 NOTE — DISCUSSION/SUMMARY
[FreeTextEntry1] : 52 year old male PMHx HTN, HLD, CVA/TIA, recent AWSTEMI, HFrEF (40%), ACC/AHA Stage C. Euvolemic on exam on post STEMI CAD medications along with GDMT. \par -c/w aspirin 81 mg and ticagrelor 90 mg bid\par - losartan 25 mg daily transitioned to entresto 24-26 mg. Patient to get blood work in a week for K\par -c/w metoprolol 25 mg XL \par -cardiac rehab referral \par -patient to follow up in 2 weeks\par -repeat TTE and blood work in 3 months from insult \par \par Differential diagnosis and plan of care discussed with patient after the evaluation. \par Counseling on diet, exercise, and medication compliance was done.\par Counseling on smoking and alcohol cessation was offered when appropriate.\par Pain assessed and judicious use of narcotics when appropriate was discussed.\par Importance of fall prevention discussed.\par Advanced care planning was discussed with patient and family.\par  [EKG obtained to assist in diagnosis and management of assessed problem(s)] : EKG obtained to assist in diagnosis and management of assessed problem(s)

## 2023-01-17 NOTE — COUNSELING
[Cessation strategies including cessation program discussed] : Cessation strategies including cessation program discussed [FreeTextEntry1] : 8

## 2023-01-17 NOTE — CARDIOLOGY SUMMARY
[de-identified] : TTE 12/2022: \par  Left ventricle systolic function appears moderately impaired; There is\par  severe hypokinesis of the mid to distal anterior and anteroseptal , and\par  apical segments. Remaining segments exhibit normal contractility.\par  Estimated left ventricular ejection fraction is 40 %.\par  The aortic valve is well visualized, appears calcified. Valve opening\par  seems to be normal.\par  Trace aortic regurgitation is present.\par  Normal appearing mitral valve structure.\par  Trace to mild mitral regurgitation is present.\par  Normal appearing tricuspid valve structure.\par  Trace tricuspid valve regurgitation is present.\par  [de-identified] : 12/18/2022 PCI\par Dominance: Right\par \par  Impression\par \par  Diagnostic Conclusions\par  1. pLAD culprit 99% with RICH 2 flow\par  2. Severe atherosclerosis of OM1\par  3. EF 30% with wrap around LAD territory. LVEDP 31 at start of case.\par \par  Interventional Conclusions\par \par  1. Successful LYLE placement to proximal LAD with IVUS used to guide post\par  dilation and stent optimization.\par  2. Heavily calcified vessel seen diffusely on IVUS with acceptable\par  proximal and distal plaque burden.\par  3. LVEDP post revascularization 19.\par \par  Recommendations\par \par  1. Continue aspirin 81 mg daily and ticagrelor 90 mg bid\par  2. Recommend aggressive medical management for CAD as per ACC/AHA\par  guidelines with risk factor modification including smoking cessation.\par  3. Gentle hydration overnight\par  4. TTE tomorrow in CCU\par  5. plan for stage PCI to Cx pending clinical course\par  6. findings relayed to patient, patient's wife, and CCU team\par \par 12/20/2022:\par \par  Successful LYLE placement placed to OM1 after cutting balloon angioplasty\par  in the setting of moderate calcium. IVUS revealed >7 mm2 area throughout\par  stent with proximal Cx with mild to moderate disease at 5.5 mm2 in native\par  tissue.\par \par  Recommendations\par \par  Maintain on DAPT for 12 months given post STEMI. Recommend close\par  cardiology follow up for optimal medical management and risk factor\par  modification. Findings relayed to patient and patient's wife.\par

## 2023-01-17 NOTE — HISTORY OF PRESENT ILLNESS
[FreeTextEntry1] : Luis Perry is a 52 year old male PMHx HTN, HLD, CVA/TIA, recent AWSTEMI who presents after recent hospitalization. 12/18 with anterior wall STEMI s/p 1 LYLE in setting of intermittent symptoms for a week or so. He underwent stage to his Cx. EF 40% with distal anterior wall hypokinesis. Reports no chest pain, sob at this time and he is back to work. He does state that he feels some dyspnea after exertion with regular activities. He has not returned to exercise regimen but does feel somewhat winded after walking long stretches. \par \par A1c 5.3\par LDL 49\par \par Discharge Medications	\par aspirin 81 mg oral tablet, chewable: 1 tab(s) orally once a day\par losartan 25 mg oral tablet: 1 tab(s) orally once a day\par rosuvastatin 20 mg oral tablet: 1 tab(s) orally once a day\par ticagrelor 90 mg oral tablet: 1 tab(s) orally every 12 hours\par Toprol-XL 25 mg oral tablet, extended release: 0.5 tab(s) orally once a day\par 	\par

## 2023-01-17 NOTE — DISCUSSION/SUMMARY
[EKG obtained to assist in diagnosis and management of assessed problem(s)] : EKG obtained to assist in diagnosis and management of assessed problem(s) [FreeTextEntry1] : 52 year old male PMHx HTN, HLD, CVA/TIA, recent AWSTEMI, HFrEF (40%), ACC/AHA Stage C. Euvolemic on exam on post STEMI CAD medications along with GDMT. \par -c/w aspirin 81 mg and ticagrelor 90 mg bid\par - losartan 25 mg daily transitioned to entresto 24-26 mg. Patient to get blood work in a week for K\par -change to metoprolol 25 mg XL \par -monitor closely, with consideration for escalation from losartan to entresto if persistent LV dysfunction \par -cardiac rehab referral \par -patient to follow up in 2 weeks\par -repeat TTE and blood work in 3 months from insult \par \par Differential diagnosis and plan of care discussed with patient after the evaluation. \par Counseling on diet, exercise, and medication compliance was done.\par Counseling on smoking and alcohol cessation was offered when appropriate.\par Pain assessed and judicious use of narcotics when appropriate was discussed.\par Importance of fall prevention discussed.\par Advanced care planning was discussed with patient and family.\par

## 2023-01-17 NOTE — CARDIOLOGY SUMMARY
[de-identified] : TTE 12/2022: \par  Left ventricle systolic function appears moderately impaired; There is\par  severe hypokinesis of the mid to distal anterior and anteroseptal , and\par  apical segments. Remaining segments exhibit normal contractility.\par  Estimated left ventricular ejection fraction is 40 %.\par  The aortic valve is well visualized, appears calcified. Valve opening\par  seems to be normal.\par  Trace aortic regurgitation is present.\par  Normal appearing mitral valve structure.\par  Trace to mild mitral regurgitation is present.\par  Normal appearing tricuspid valve structure.\par  Trace tricuspid valve regurgitation is present.\par  [de-identified] : 12/18/2022 PCI\par Dominance: Right\par \par  Impression\par \par  Diagnostic Conclusions\par  1. pLAD culprit 99% with RICH 2 flow\par  2. Severe atherosclerosis of OM1\par  3. EF 30% with wrap around LAD territory. LVEDP 31 at start of case.\par \par  Interventional Conclusions\par \par  1. Successful LYLE placement to proximal LAD with IVUS used to guide post\par  dilation and stent optimization.\par  2. Heavily calcified vessel seen diffusely on IVUS with acceptable\par  proximal and distal plaque burden.\par  3. LVEDP post revascularization 19.\par \par  Recommendations\par \par  1. Continue aspirin 81 mg daily and ticagrelor 90 mg bid\par  2. Recommend aggressive medical management for CAD as per ACC/AHA\par  guidelines with risk factor modification including smoking cessation.\par  3. Gentle hydration overnight\par  4. TTE tomorrow in CCU\par  5. plan for stage PCI to Cx pending clinical course\par  6. findings relayed to patient, patient's wife, and CCU team\par \par 12/20/2022:\par \par  Successful LYLE placement placed to OM1 after cutting balloon angioplasty\par  in the setting of moderate calcium. IVUS revealed >7 mm2 area throughout\par  stent with proximal Cx with mild to moderate disease at 5.5 mm2 in native\par  tissue.\par \par  Recommendations\par \par  Maintain on DAPT for 12 months given post STEMI. Recommend close\par  cardiology follow up for optimal medical management and risk factor\par  modification. Findings relayed to patient and patient's wife.\par

## 2023-02-08 NOTE — ED STATDOCS - DATE/TIME 1
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 He did not receive treatment for the CLL and was monitoring it with an oncologist.      In the beginning of July of 2022, he began to complain of weight loss, weakness, headaches and anorexia. He also began to experience slurred speech, tremors, and significant lower extremity weakness.  CT scan of the chest, abdomen and pelvis did not show any significant lymphadenopathy.  MRI of the brain did show lesions in the brain suggestive of malignancy.  MRI of the cervical, thoracic and lumbar spine were negative.  Lumbar puncture and CSF evaluation did not show any cytologic evidence of involvement with malignancy.   A biopsy of the brain lesions was performed on 8/18/22 and was consistent with Diffuse Large B-Cell Lymphoma (DLBCL). He had a port placed on 8/29/22 and started chemotherapy treatments with High-Dose Methotrexate, Rituxan, and oral Temodar. He was also initiated on PO Dexamethasone to decrease CNS inflammation and alleviate symptoms.     Javier reports feeling well throughout all cycles of chemotherapy, experienced minimal side effects and toxicities. He reports his neurological changes abated after the 3rd or 4th cycle of chemotherapy, at which point he returned back to baseline. He does still have tremors in his hands bilaterally.      He reports a PMH significant for cardiac disease with OHS done in 2014. He reports having no cardiac concerns since that time, although he does remain on statin therapy. Denies history of hypertension or diabetes.     1/31/23: Autologous SCT D-6: Don  was admitted this morning to begin conditioning chemotherapy with Carmustine and Thiotepa. He reports feeling well at home, denies any new symptoms consistent with his CNS lymphoma. Is experiencing some constipation. Continues to have bilateral hand tremors. Denies nausea, vomiting, diarrhea, fevers, chills, shortness of breath, or chest pain.     2/1/23: Autologous SCT D-5: Javier tolerated Carmustine infusion well. He had some throat discomfort overnight c/w acid reflux, Protonix added daily. Denies nausea, vomiting, diarrhea, fevers, chills, shortness of breath, or chest pain.   Will continue with conditioning regimen today.    2/2/23: Autologous SCT D-4: Javier tolerated D1 of Thiotepa well yesterday, will continue with regimen today. He is endorsing some constipation, will add Miralax. Denies nausea, vomiting, diarrhea, fevers, chills, shortness of breath, or chest pain.     2/3/23: Autologous SCT Day -3: No acute events overnight, Javier tolerated chemotherapy well. This morning he complains of slight nausea and PRN Compazine was added as well as 1x dose of Zofran. He denies chest pain, shortness of breath, fevers, or chills.     2/4/23: Autologous SCT Day -2: No acute events overnight. This morning, Javier complains of nausea unrelieved by PRN Compazine. Zofran/Dex x1 added. He also endorses constipation. Will check abdominal x-ray to assess stool burden. He is otherwise doing well and denies chest pain, shortness of breath, fevers, or chills.     2/5/23: Autologous SCT Day -1: No acute events overnight. Abdominal x-ray showing moderate amount of fecal material and Lactulose Q6H x3 doses was ordered. Javier endorses relief of nausea after Zofran/Dex completed yesterday. He denies chest pain, shortness of breath, fevers, or chills. Plan for stem cell reinfusion tomorrow.    2/6/23: Autologous SCT Day 0: No acute events overnight. Javier reports feeling slightly nauseated this morning, is able to eat breakfast. His constipation  is resolving with several bowel movements yesterday. Denies fevers, chills, shortness of breath, or chest pain.     2/7/23: Autologous SCT Day +1: No acute events overnight, Don tolerated stem cells well. Slight nausea this morning unrelieved by Compazine, will trial Zofran. Denies vomiting, constipation, diarrhea, fevers, chills, shortness of breath, or chest pain.     2/8/23: Autologous SCT D+2: No acute events overnight. Experiencing nausea this morning, will hold AM Levaquin and see if nausea abates. Denies vomiting, diarrhea, fevers, chills, shortness of breath, or chest pain.     Review of Systems   Constitutional: Positive for activity change. Negative for appetite change, chills, fatigue and fever.   HENT: Negative for mouth sores, nosebleeds and sore throat.    Eyes: Negative.    Respiratory: Negative for cough and shortness of breath.    Cardiovascular: Negative for chest pain, palpitations and leg swelling.   Gastrointestinal: Positive for nausea. Negative for abdominal pain, constipation, diarrhea and vomiting.   Endocrine: Negative.    Genitourinary: Negative for difficulty urinating.   Musculoskeletal: Negative for back pain, joint swelling and myalgias.   Skin: Negative for pallor and rash.   Allergic/Immunologic: Negative.    Neurological: Negative for tremors, syncope, weakness and headaches.   Psychiatric/Behavioral: Negative for confusion. The patient is not nervous/anxious.      ALLERGIES:   Allergen Reactions   • Atorvastatin MYALGIA   • Lisinopril Cough       Current Facility-Administered Medications   Medication Dose Route Frequency Provider Last Rate Last Admin   • sodium chloride 0.9% infusion  1,000 mL Intravenous Continuous Pillo Schwartz MD 75 mL/hr at 02/08/23 0152 1,000 mL at 02/08/23 0152   • ondansetron (ZOFRAN) tablet 8 mg  8 mg Oral BID PRN ALF Chappell       • polyethylene glycol (MIRALAX) packet 17 g  17 g Oral Daily ALF Chappell       • ondansetron (ZOFRAN) tablet 4  mg  4 mg Oral BID PRN Roberta Sturmwind, APNP       • alteplase (CATHFLO ACTIVASE) injection 2 mg  2 mg Intracatheter Once PRN Eugenia Ortega,        • traMADol (ULTRAM) tablet 50 mg  50 mg Oral Q6H PRN Roberta Sturmwind, APNP       • pantoprazole (PROTONIX) EC tablet 40 mg  40 mg Oral Daily Roberta Sturmwind, APNP   40 mg at 02/08/23 0934   • melatonin tablet 6 mg  6 mg Oral Nightly PRN Roberta Sturmwind, APNP       • zolpidem (AMBIEN) tablet 5 mg  5 mg Oral Nightly PRN Roberta Sturmwind, APNP   5 mg at 02/07/23 2028   • potassium CHLORIDE 40 mEq/100 mL IVPB premix  40 mEq Intravenous PRN Sherron Dozier, CNP       • calcium gluconate 4 g in sodium chloride 0.9 % 250 mL IVPB  4 g Intravenous PRN Sherron Dozier, CNP       • magnesium sulfate 4 g in sterile water IVPB  4 g Intravenous PRN Sherron Dozier, CNP       • potassium phosphate 40 mmol in sodium chloride 0.9 % 500 mL IVPB  40 mmol Intravenous PRN Sherron Dozier, CNP       • potassium phosphate 20 mmol in sodium chloride 0.9 % 250 mL IVPB  20 mmol Intravenous PRN Sherron Dozier, CNP       • sodium PHOSPHATE 20 mmol in sodium chloride 0.9 % 250 mL IVPB  20 mmol Intravenous PRN Sherron Dozier, CNP       • sodium PHOSPHATE 40 mmol in sodium chloride 0.9 % 500 mL IVPB  40 mmol Intravenous PRN Sherron Dozier, CNP       • heparin (porcine) 100 UNIT/ML lock flush 80 Units  80 Units Intracatheter PRN Sherron Dozier, CNP       • heparin (porcine) 100 UNIT/ML lock flush 80 Units  80 Units Intracatheter PRN Sherron Dozier, CNP       • heparin (porcine) 100 UNIT/ML lock flush 90 Units  90 Units Intracatheter PRN Sherron Dozier, CNP       • sodium chloride 0.9 % flush bag 25 mL  25 mL Intravenous PRN Roberta Sturmwind, APNP       • sodium chloride (PF) 0.9 % injection 2 mL  2 mL Intracatheter 2 times per day Roberta Sturmwind, APNP   2 mL at 02/08/23 0935   • sodium chloride (PF) 0.9 % injection 10 mL  10 mL Injection PRN ALF Chappell   10 mL at  02/07/23 0814   • sodium chloride (PF) 0.9 % injection 10 mL  10 mL Injection 2 times per day Roberta Sturmwind, APNP   10 mL at 02/08/23 0935   • sodium chloride (PF) 0.9 % injection 20 mL  20 mL Injection PRN Roberta Sturmwind, APNP       • enoxaparin (LOVENOX) injection 40 mg  40 mg Subcutaneous Nightly Roberta Sturmwind, APNP   40 mg at 02/07/23 2028   • docusate sodium (COLACE) capsule 100 mg  100 mg Oral BID Roberta Sturmwind, APNP   100 mg at 02/05/23 2020   • furosemide (LASIX INJECT) injection 20 mg  20 mg Intravenous BID PRN Alen Zaragoza MD   20 mg at 02/03/23 0735   • [START ON 2/11/2023] filgrastim-sndz (G-CSF) (ZARXIO) injection 480 mcg  5 mcg/kg (Treatment Plan Recorded) Subcutaneous Daily at noon Alen Zaragoza MD       • valACYclovir (VALTREX) tablet 500 mg  500 mg Oral BID Alen Zaragoza MD   500 mg at 02/08/23 0934   • fluconazole (DIFLUCAN) tablet 400 mg  400 mg Oral Daily Alen Zaragoza MD   400 mg at 02/08/23 0934   • [Held by provider] levoFLOXacin (LEVAQUIN) tablet 500 mg  500 mg Oral QAM AC Alen Zaragoza MD   500 mg at 02/08/23 0558   • sodium chloride (NORMAL SALINE) 0.9 % bolus 1,000 mL  1,000 mL Intravenous Once PRN Alen Zaragoza MD       • sodium chloride (NORMAL SALINE) 0.9 % bolus 1,000 mL  1,000 mL Intravenous Once PRN Alen Zaragoza MD       • EPINEPHrine (ADRENALIN) injection 0.3 mg  0.3 mg Intramuscular Q5 Min PRN Alen Zaragoza MD       • diphenhydrAMINE (BENADRYL) injection 50 mg  50 mg Intravenous Once PRN Alen Zaragoza MD       • famotidine (PEPCID) injection 20 mg  20 mg Intravenous Once PRN Alen Zaragoza MD       • methylPREDNISolone (SOLU-Medrol) PF injection 125 mg  125 mg Intravenous Once PRN Alen Zaragoza MD       • albuterol inhaler 2 puff  2 puff Inhalation Q20 Min PRN Alen Zaragoza MD       • albuterol (VENTOLIN) nebulizer 5 mg  5 mg Nebulization Q20 Min PRN Alen Zaragoza MD           Vital Last Value 24 Hour Range    Temperature 98.4 °F (36.9 °C) (02/08/23 0949) Temp  Min: 97.2 °F (36.2 °C)  Max: 99 °F (37.2 °C)   Pulse 67 (02/08/23 0949) Pulse  Min: 55  Max: 76   Respiratory 18 (02/08/23 0949) Resp  Min: 16  Max: 18   Non-Invasive  Blood Pressure 135/84 (02/08/23 0949) BP  Min: 114/79  Max: 144/78   Pulse Oximetry 96 % (02/08/23 0508) SpO2  Min: 95 %  Max: 100 %   Arterial   Blood Pressure   No data recorded      Wt Readings from Last 1 Encounters:   02/08/23 89.7 kg (197 lb 12 oz)          Weight change: -1.7 kg (-3 lb 12 oz)       Intake/Output Summary (Last 24 hours) at 2/8/2023 1158  Last data filed at 2/8/2023 0318  Gross per 24 hour   Intake 976.64 ml   Output 2100 ml   Net -1123.36 ml       Physical Exam  Constitutional:       General: He is not in acute distress.     Appearance: Normal appearance.   HENT:      Head: Normocephalic.      Right Ear: External ear normal.      Left Ear: External ear normal.      Nose: Nose normal.      Mouth/Throat:      Mouth: Mucous membranes are moist.      Pharynx: Oropharynx is clear.      Neck: Normal range of motion.   Eyes:      Extraocular Movements: Extraocular movements intact.      Pupils: Pupils are equal, round, and reactive to light.   Cardiovascular:      Rate and Rhythm: Normal rate and regular rhythm.      Pulses: Normal pulses.      Heart sounds: Normal heart sounds.   Pulmonary:      Effort: Pulmonary effort is normal.      Breath sounds: Normal breath sounds.   Abdominal:      General: Abdomen is flat. There is no distension.      Palpations: Abdomen is soft.      Tenderness: There is no abdominal tenderness.   Musculoskeletal:         General: Normal range of motion.   Skin:     General: Skin is warm and dry.   Neurological:      Mental Status: He is alert. Mental status is at baseline.   Psychiatric:         Mood and Affect: Mood normal.         Behavior: Behavior normal.       Laboratory Results  Recent Results (from the past 24 hour(s))   Magnesium    Collection  Time: 02/08/23  3:11 AM   Result Value Ref Range    Magnesium 2.2 1.7 - 2.4 mg/dL   Phosphorus    Collection Time: 02/08/23  3:11 AM   Result Value Ref Range    Phosphorus 3.5 2.4 - 4.7 mg/dL   Comprehensive Metabolic Panel    Collection Time: 02/08/23  3:11 AM   Result Value Ref Range    Fasting Status      Sodium 138 135 - 145 mmol/L    Potassium 3.9 3.4 - 5.1 mmol/L    Chloride 109 97 - 110 mmol/L    Carbon Dioxide 26 21 - 32 mmol/L    Anion Gap 7 7 - 19 mmol/L    Glucose 98 70 - 99 mg/dL    BUN 12 6 - 20 mg/dL    Creatinine 0.78 0.67 - 1.17 mg/dL    Glomerular Filtration Rate >90 >=60    BUN/ Creatinine Ratio 15 7 - 25    Calcium 8.5 8.4 - 10.2 mg/dL    Bilirubin, Total 0.6 0.2 - 1.0 mg/dL    GOT/AST 21 <=37 Units/L    GPT/ALT 37 <64 Units/L    Alkaline Phosphatase 55 45 - 117 Units/L    Albumin 2.9 (L) 3.6 - 5.1 g/dL    Protein, Total 5.3 (L) 6.4 - 8.2 g/dL    Globulin 2.4 2.0 - 4.0 g/dL    A/G Ratio 1.2 1.0 - 2.4   CBC with Automated Differential (performable only)    Collection Time: 02/08/23  3:11 AM   Result Value Ref Range    WBC 1.2 (L) 4.2 - 11.0 K/mcL    RBC 3.59 (L) 4.50 - 5.90 mil/mcL    HGB 11.5 (L) 13.0 - 17.0 g/dL    HCT 33.9 (L) 39.0 - 51.0 %    MCV 94.4 78.0 - 100.0 fl    MCH 32.0 26.0 - 34.0 pg    MCHC 33.9 32.0 - 36.5 g/dL    RDW-CV 11.9 11.0 - 15.0 %    RDW-SD 41.5 39.0 - 50.0 fL    PLT 72 (L) 140 - 450 K/mcL    NRBC 0 <=0 /100 WBC   Manual Differential    Collection Time: 02/08/23  3:11 AM   Result Value Ref Range    Neutrophil, Percent 47 %    Lymphocytes, Percent 40 %    Mono, Percent 9 %    Eosinophils, Percent 2 %    Basophils, Percent 2 %    Absolute Neutrophil 0.6 (L) 1.8 - 7.7 K/mcL    Absolute Lymphocytes 0.5 (L) 1.0 - 4.0 K/mcL    Absolute Monocytes 0.1 (L) 0.3 - 0.9 K/mcL    Absolute Eosinophils 0.0 0.0 - 0.5 K/mcL    Absolute Basophils 0.0 0.0 - 0.3 K/mcL    WBC Morphology Normal Normal    Ovalocytes Few     Platelet Morphology Normal Normal       Imaging  XR ABDOMEN 1 VIEW   Final  Result       Moderate amount of fecal material present within the colon and rectum.         Electronically Signed by: JENNY JIMÉNEZ M.D.    Signed on: 2/4/2023 1:30 PM              Assessment and Plan  Active Hospital Problems    Diagnosis    • CNS lymphoma (CMS/HCC)      Autologous SCT:  Carmustine/Thiotepa  Day -6: Carmustine  Day -5: Thiotepa  Day -4: Thiotepa  Day  0: Stem Cell Reinfusion             Infectious Disease Prophylaxis:             - Fluconazole 400mg daily             - Valtrex 500mg BID             - Levaquin 500mg daily   Day +5: Begin GCSF injections     Hematology:  - Transfuse 1u PRBCs for hemoglobin < 7.0  - Transfuse 1u platelets for platelets < 10, or <20 if actively bleeding to prevent complications from hemorrhage      Infectious Disease Prophylaxis:  - Fluconazole 400mg daily  - Valtrex 500mg BID  - HOLD Levaquin 500mg daily d/t nausea     FEN/GI:  - Hydration per treatment plan orders  - Replete electrolytes per paper orders in patient's chart  - Protonix 40mg PO daily for reflux    Constipation  -Colace BID and Miralax daily scheduled  -Abdominal x-ray 2/4- moderate amount of fecal material present within the colon and rectum  -Lactulose Q6H x 3 doses 2/5     DVT Prophylaxis:  - Lovenox 40mg daily   - Discontinue Lovenox when plt < 50    Chemotherapy- Induced Nausea:  -1x dose of Zofran 8 mg 2/3  -Schedule Compazine 10 mg Q6H 2/6   -1x dose Zofran/Dexamethasone 2/4  - Zofran PRN 4mg BID added 2/7  - HOLD levaquin starting 2/9 for AM nausea     Other:  Urinary frequency:  - Continue home Flomax 0.4mg daily     KPS Score: 90%     Time  Total face to face time spent with patient 45 minutes.  Greater than 50% of the total time was spent counseling and/or coordinating care.     Patient's case discussed with DIANA Singer-TED    16-Dec-2019 11:48

## 2023-03-07 ENCOUNTER — APPOINTMENT (OUTPATIENT)
Dept: CARDIOLOGY | Facility: CLINIC | Age: 53
End: 2023-03-07
Payer: COMMERCIAL

## 2023-03-07 VITALS
SYSTOLIC BLOOD PRESSURE: 162 MMHG | DIASTOLIC BLOOD PRESSURE: 90 MMHG | OXYGEN SATURATION: 96 % | WEIGHT: 223 LBS | BODY MASS INDEX: 31.92 KG/M2 | HEART RATE: 82 BPM | HEIGHT: 70 IN

## 2023-03-07 PROCEDURE — 93000 ELECTROCARDIOGRAM COMPLETE: CPT

## 2023-03-07 PROCEDURE — 99215 OFFICE O/P EST HI 40 MIN: CPT | Mod: 25

## 2023-03-07 RX ORDER — LOSARTAN POTASSIUM 25 MG/1
25 TABLET, FILM COATED ORAL DAILY
Qty: 30 | Refills: 0 | Status: DISCONTINUED | COMMUNITY
End: 2023-03-07

## 2023-03-07 RX ORDER — DAPAGLIFLOZIN 10 MG/1
10 TABLET, FILM COATED ORAL DAILY
Qty: 30 | Refills: 3 | Status: ACTIVE | COMMUNITY
Start: 2023-03-07 | End: 1900-01-01

## 2023-03-08 ENCOUNTER — NON-APPOINTMENT (OUTPATIENT)
Age: 53
End: 2023-03-08

## 2023-03-09 ENCOUNTER — NON-APPOINTMENT (OUTPATIENT)
Age: 53
End: 2023-03-09

## 2023-03-09 ENCOUNTER — EMERGENCY (EMERGENCY)
Facility: HOSPITAL | Age: 53
LOS: 0 days | Discharge: HOME | End: 2023-03-09
Attending: EMERGENCY MEDICINE
Payer: COMMERCIAL

## 2023-03-09 VITALS
TEMPERATURE: 98 F | OXYGEN SATURATION: 100 % | HEART RATE: 73 BPM | SYSTOLIC BLOOD PRESSURE: 132 MMHG | DIASTOLIC BLOOD PRESSURE: 88 MMHG | RESPIRATION RATE: 20 BRPM

## 2023-03-09 VITALS — WEIGHT: 199.96 LBS | HEIGHT: 70 IN

## 2023-03-09 DIAGNOSIS — Z95.5 PRESENCE OF CORONARY ANGIOPLASTY IMPLANT AND GRAFT: ICD-10-CM

## 2023-03-09 DIAGNOSIS — Z90.09 ACQUIRED ABSENCE OF OTHER PART OF HEAD AND NECK: ICD-10-CM

## 2023-03-09 DIAGNOSIS — Z88.0 ALLERGY STATUS TO PENICILLIN: ICD-10-CM

## 2023-03-09 DIAGNOSIS — Z86.74 PERSONAL HISTORY OF SUDDEN CARDIAC ARREST: ICD-10-CM

## 2023-03-09 DIAGNOSIS — Z98.890 OTHER SPECIFIED POSTPROCEDURAL STATES: Chronic | ICD-10-CM

## 2023-03-09 DIAGNOSIS — K80.20 CALCULUS OF GALLBLADDER WITHOUT CHOLECYSTITIS WITHOUT OBSTRUCTION: ICD-10-CM

## 2023-03-09 DIAGNOSIS — Z90.49 ACQUIRED ABSENCE OF OTHER SPECIFIED PARTS OF DIGESTIVE TRACT: ICD-10-CM

## 2023-03-09 DIAGNOSIS — Z90.89 ACQUIRED ABSENCE OF OTHER ORGANS: Chronic | ICD-10-CM

## 2023-03-09 DIAGNOSIS — Z87.442 PERSONAL HISTORY OF URINARY CALCULI: ICD-10-CM

## 2023-03-09 DIAGNOSIS — R42 DIZZINESS AND GIDDINESS: ICD-10-CM

## 2023-03-09 DIAGNOSIS — I10 ESSENTIAL (PRIMARY) HYPERTENSION: ICD-10-CM

## 2023-03-09 DIAGNOSIS — Z90.49 ACQUIRED ABSENCE OF OTHER SPECIFIED PARTS OF DIGESTIVE TRACT: Chronic | ICD-10-CM

## 2023-03-09 DIAGNOSIS — Z86.73 PERSONAL HISTORY OF TRANSIENT ISCHEMIC ATTACK (TIA), AND CEREBRAL INFARCTION WITHOUT RESIDUAL DEFICITS: ICD-10-CM

## 2023-03-09 DIAGNOSIS — I25.10 ATHEROSCLEROTIC HEART DISEASE OF NATIVE CORONARY ARTERY WITHOUT ANGINA PECTORIS: ICD-10-CM

## 2023-03-09 DIAGNOSIS — Z79.82 LONG TERM (CURRENT) USE OF ASPIRIN: ICD-10-CM

## 2023-03-09 DIAGNOSIS — E78.5 HYPERLIPIDEMIA, UNSPECIFIED: ICD-10-CM

## 2023-03-09 PROCEDURE — 99284 EMERGENCY DEPT VISIT MOD MDM: CPT

## 2023-03-09 PROCEDURE — 93010 ELECTROCARDIOGRAM REPORT: CPT

## 2023-03-09 PROCEDURE — 99283 EMERGENCY DEPT VISIT LOW MDM: CPT

## 2023-03-09 PROCEDURE — 93005 ELECTROCARDIOGRAM TRACING: CPT

## 2023-03-09 RX ORDER — MECLIZINE HCL 12.5 MG
1 TABLET ORAL
Qty: 20 | Refills: 0
Start: 2023-03-09

## 2023-03-09 RX ORDER — FLUTICASONE PROPIONATE 50 MCG
1 SPRAY, SUSPENSION NASAL
Qty: 1 | Refills: 0
Start: 2023-03-09

## 2023-03-09 NOTE — ED STATDOCS - CLINICAL SUMMARY MEDICAL DECISION MAKING FREE TEXT BOX
Pt with symptoms consistent with BPB, will treat with meclozine and steroid nasal spray. Pt with h/o of CVA, discussed that given his history we should do further imaging and evaluation, however pt declines it at this time and does not want to stay. States he will follow up with his neurologist Dr. Hoover. Strict return precautions discussed at length.

## 2023-03-09 NOTE — ED STATDOCS - NSFOLLOWUPINSTRUCTIONS_ED_ALL_ED_FT
FOLLOW UP WITH YOUR NEUROLOGIST AS SOON AS POSSIBLE. RETURN TO ED IF SYMPTOMS WORSEN.     Dizziness    WHAT YOU NEED TO KNOW:    Dizziness is a feeling of being off balance or unsteady. Common causes of dizziness are an inner ear fluid imbalance or a lack of oxygen in your blood. Dizziness may be acute (lasts 3 days or less) or chronic (lasts longer than 3 days). You may have dizzy spells that last from seconds to a few hours.     DISCHARGE INSTRUCTIONS:    Return to the emergency department if:     You have a headache and a stiff neck.      You have shaking chills and a fever.       You vomit over and over with no relief.       Your vomit or bowel movements are red or black.       You have pain in your chest, back, or abdomen.       You have numbness, especially in your face, arms, or legs.       You have trouble moving your arms or legs.       You are confused.     Contact your healthcare provider if:     You have a fever.       Your symptoms do not get better with treatment.       You have questions or concerns about your condition or care.     Manage your symptoms:     Do not drive or operate heavy machinery when you are dizzy.       Get up slowly from sitting or lying down.       Drink plenty of liquids. Liquids help prevent dehydration. Ask how much liquid to drink each day and which liquids are best for you.    Follow up with your healthcare provider as directed: Write down your questions so you remember to ask them during your visits.

## 2023-03-09 NOTE — ED STATDOCS - OBJECTIVE STATEMENT
53 y/o male with PMHx of CAD s/p stents, HLD, HTN presents to the ED SIB  c/o dizziness since last night, states that it is worse when he first gets up after lying down. States he has a history of heart attack in December 2022. Pt is on Brilinta and metoprolol. Pt states in the past he had some left sided weakness that they thought was a TIA, but states that he followed up on it and was told it was related to a "blood vessel that was having spasms in his brain" and not TIA. Pt denies imaging of head, states he does not feel like his prior history is related to his symptoms. Pt's pharmacy is [facility] in [email or web address]. Denies chest pain, denies SOB.

## 2023-03-09 NOTE — ED ADULT TRIAGE NOTE - CHIEF COMPLAINT QUOTE
Pt c/o constant dizziness since last night. HX of 2 cardiac stents Dec 2022 on 81mg ASA and Brilinta, HTN, and HDL. Denies chest pain, SOB, and vision changes. STAT EKG to be completed.

## 2023-03-09 NOTE — ED STATDOCS - ATTENDING APP SHARED VISIT CONTRIBUTION OF CARE
I, Roxana Clemons MD,  performed the initial face to face bedside interview with this patient regarding history of present illness, review of symptoms and relevant past medical, social and family history.  I completed an independent physical examination.  I was the initial provider who evaluated this patient.   I personally saw the patient and performed a substantive portion of the visit including all aspects of the medical decision making.  I have signed out the follow up of any pending tests (i.e. labs, radiological studies) to the [facility].  I have communicated the patient’s plan of care and disposition with the [facility].  The history, relevant review of systems, past medical and surgical history, medical decision making, and physical examination was documented by the scribe in my presence and I attest to the accuracy of the documentation.

## 2023-03-09 NOTE — ED STATDOCS - PATIENT PORTAL LINK FT
You can access the FollowMyHealth Patient Portal offered by [facility] by registering at the following website: [email or web address]. By joining [employer]’s FollowMyHealth portal, you will also be able to view your health information using other applications (apps) compatible with our system.

## 2023-03-09 NOTE — ED STATDOCS - PRO INTERPRETER NEED 2
Emelia Lowe is a 37 y.o. male who was seen by synchronous (real-time) audio-video technology on 5/19/2021 for No chief complaint on file. Assessment & Plan:   Diagnoses and all orders for this visit:    1. Encounter for smoking cessation counseling    Other orders  -     varenicline (CHANTIX) 1 mg tablet; Take 1 Tablet by mouth two (2) times a day. Refill given for Chantix, patient will continue and monitor for any side effects. Patient will follow up in 4 weeks        Subjective:     Patient is a 76-year-old male who is following up for smoking cessation. Patient is stable to start pack of Chantix is now on his second month of Chantix 1 mg twice daily. Patient states he is handling medication well and is down to half pack. Patient states that he has no side effects of medication and denies any suicidal or homicidal ideations. He is happy with current medical like to continue. Current Outpatient Medications   Medication Sig Dispense Refill    varenicline (CHANTIX) 1 mg tablet Take 1 Tablet by mouth two (2) times a day. 60 Tablet 0     No Known Allergies  No past medical history on file. No past surgical history on file. Family History   Problem Relation Age of Onset   Rohit.Gin Cancer Mother     Cancer Father     Hypertension Father      Social History     Tobacco Use    Smoking status: Current Every Day Smoker    Smokeless tobacco: Never Used   Substance Use Topics    Alcohol use: Not Currently     Prior to Admission medications    Medication Sig Start Date End Date Taking? Authorizing Provider   varenicline (CHANTIX) 1 mg tablet Take 1 Tablet by mouth two (2) times a day. 5/19/21  Yes Elizabeth Ramirez MD     There are no problems to display for this patient. Current Outpatient Medications   Medication Sig Dispense Refill    varenicline (CHANTIX) 1 mg tablet Take 1 Tablet by mouth two (2) times a day. 60 Tablet 0     No Known Allergies  No past medical history on file.   No past surgical history on file. Family History   Problem Relation Age of Onset    Cancer Mother     Cancer Father     Hypertension Father      Social History     Tobacco Use    Smoking status: Current Every Day Smoker    Smokeless tobacco: Never Used   Substance Use Topics    Alcohol use: Not Currently       ROS    Objective:   No flowsheet data found. [INSTRUCTIONS:  \"[x]\" Indicates a positive item  \"[]\" Indicates a negative item  -- DELETE ALL ITEMS NOT EXAMINED]    Constitutional: [x] Appears well-developed and well-nourished [x] No apparent distress      [] Abnormal -     Mental status: [x] Alert and awake  [x] Oriented to person/place/time [x] Able to follow commands    [] Abnormal -     Eyes:   EOM    [x]  Normal    [] Abnormal -   Sclera  [x]  Normal    [] Abnormal -          Discharge [x]  None visible   [] Abnormal -     HENT: [x] Normocephalic, atraumatic  [] Abnormal -   [x] Mouth/Throat: Mucous membranes are moist    External Ears [x] Normal  [] Abnormal -    Neck: [x] No visualized mass [] Abnormal -     Pulmonary/Chest: [x] Respiratory effort normal   [x] No visualized signs of difficulty breathing or respiratory distress        [] Abnormal -      Musculoskeletal:   [x] Normal gait with no signs of ataxia         [x] Normal range of motion of neck        [] Abnormal -     Neurological:        [x] No Facial Asymmetry (Cranial nerve 7 motor function) (limited exam due to video visit)          [x] No gaze palsy        [] Abnormal -          Skin:        [x] No significant exanthematous lesions or discoloration noted on facial skin         [] Abnormal -            Psychiatric:       [x] Normal Affect [] Abnormal -        [x] No Hallucinations    Other pertinent observable physical exam findings:-        We discussed the expected course, resolution and complications of the diagnosis(es) in detail. Medication risks, benefits, costs, interactions, and alternatives were discussed as indicated.   I advised him to contact the office if his condition worsens, changes or fails to improve as anticipated. He expressed understanding with the diagnosis(es) and plan. Ousmane Trupti was evaluated through a synchronous (real-time) audio-video encounter. The patient (or guardian if applicable) is aware that this is a billable service. Verbal consent to proceed has been obtained within the past 12 months. The visit was conducted pursuant to the emergency declaration under the [address] [place] [address] and the [employer] General Act. Patient identification was verified, and a caregiver was present when appropriate. The patient was located in a state where the provider was credentialed to provide care.       Deboraha Bosworth, MD English

## 2023-03-15 LAB
ANION GAP SERPL CALC-SCNC: 16 MMOL/L
APO LP(A) SERPL-MCNC: 12 NMOL/L
BUN SERPL-MCNC: 14 MG/DL
CALCIUM SERPL-MCNC: 10 MG/DL
CHLORIDE SERPL-SCNC: 98 MMOL/L
CHOLEST SERPL-MCNC: 135 MG/DL
CO2 SERPL-SCNC: 24 MMOL/L
CREAT SERPL-MCNC: 0.95 MG/DL
CRP SERPL HS-MCNC: 0.68 MG/L
EGFR: 95 ML/MIN/1.73M2
ESTIMATED AVERAGE GLUCOSE: 114 MG/DL
GLUCOSE SERPL-MCNC: 79 MG/DL
HBA1C MFR BLD HPLC: 5.6 %
HDLC SERPL-MCNC: 49 MG/DL
LDLC SERPL CALC-MCNC: 64 MG/DL
NONHDLC SERPL-MCNC: 86 MG/DL
POTASSIUM SERPL-SCNC: 5.3 MMOL/L
SODIUM SERPL-SCNC: 137 MMOL/L
TRIGL SERPL-MCNC: 113 MG/DL

## 2023-03-15 NOTE — CARDIOLOGY SUMMARY
[de-identified] : TTE 12/2022: \par  Left ventricle systolic function appears moderately impaired; There is\par  severe hypokinesis of the mid to distal anterior and anteroseptal , and\par  apical segments. Remaining segments exhibit normal contractility.\par  Estimated left ventricular ejection fraction is 40 %.\par  The aortic valve is well visualized, appears calcified. Valve opening\par  seems to be normal.\par  Trace aortic regurgitation is present.\par  Normal appearing mitral valve structure.\par  Trace to mild mitral regurgitation is present.\par  Normal appearing tricuspid valve structure.\par  Trace tricuspid valve regurgitation is present.\par  [de-identified] : 12/18/2022 PCI\par Dominance: Right\par \par  Impression\par \par  Diagnostic Conclusions\par  1. pLAD culprit 99% with RICH 2 flow\par  2. Severe atherosclerosis of OM1\par  3. EF 30% with wrap around LAD territory. LVEDP 31 at start of case.\par \par  Interventional Conclusions\par \par  1. Successful LYLE placement to proximal LAD with IVUS used to guide post\par  dilation and stent optimization.\par  2. Heavily calcified vessel seen diffusely on IVUS with acceptable\par  proximal and distal plaque burden.\par  3. LVEDP post revascularization 19.\par \par  Recommendations\par \par  1. Continue aspirin 81 mg daily and ticagrelor 90 mg bid\par  2. Recommend aggressive medical management for CAD as per ACC/AHA\par  guidelines with risk factor modification including smoking cessation.\par  3. Gentle hydration overnight\par  4. TTE tomorrow in CCU\par  5. plan for stage PCI to Cx pending clinical course\par  6. findings relayed to patient, patient's wife, and CCU team\par \par 12/20/2022:\par \par  Successful LYLE placement placed to OM1 after cutting balloon angioplasty\par  in the setting of moderate calcium. IVUS revealed >7 mm2 area throughout\par  stent with proximal Cx with mild to moderate disease at 5.5 mm2 in native\par  tissue.\par \par  Recommendations\par \par  Maintain on DAPT for 12 months given post STEMI. Recommend close\par  cardiology follow up for optimal medical management and risk factor\par  modification. Findings relayed to patient and patient's wife.\par

## 2023-03-15 NOTE — HISTORY OF PRESENT ILLNESS
[FreeTextEntry1] : Luis Perry is a 52 year old male PMHx HTN, HLD, CVA/TIA, recent AWSTEMI who presents after recent hospitalization. 12/18 with anterior wall STEMI s/p 1 LYLE in setting of intermittent symptoms for a week or so. He underwent stage to his Cx. EF 40% with distal anterior wall hypokinesis. Reports no chest pain, sob at this time and he is back to work. He does state that he feels some dyspnea after exertion with regular activities. He has not returned to exercise regimen but does feel somewhat winded after walking long stretches. \par \par Recently put on entresto. Weight is heavier. No chest pain. \par \par A1c 5.3\par LDL 49\par \par Discharge Medications	\par aspirin 81 mg oral tablet, chewable: 1 tab(s) orally once a day\par losartan 25 mg oral tablet: 1 tab(s) orally once a day\par rosuvastatin 20 mg oral tablet: 1 tab(s) orally once a day\par ticagrelor 90 mg oral tablet: 1 tab(s) orally every 12 hours\par Toprol-XL 25 mg oral tablet, extended release: 0.5 tab(s) orally once a day\par 	\par

## 2023-03-15 NOTE — DISCUSSION/SUMMARY
[FreeTextEntry1] : 52 year old male PMHx HTN, HLD, CVA/TIA, recent AWSTEMI, HFrEF (40%), ACC/AHA Stage C. Euvolemic on exam on post STEMI CAD medications along with GDMT. \par -c/w aspirin 81 mg and ticagrelor 90 mg bid\par -c/w entresto 24-26 mg. Patient to get blood work in a week given hemolyzed K\par -c/w metoprolol 25 mg XL \par -start farixga 10 mg daily\par -patient to follow up in 2 weeks\par -repeat TTE and blood work in 3 months from insult \par \par Differential diagnosis and plan of care discussed with patient after the evaluation. \par Counseling on diet, exercise, and medication compliance was done.\par Counseling on smoking and alcohol cessation was offered when appropriate.\par Pain assessed and judicious use of narcotics when appropriate was discussed.\par Importance of fall prevention discussed.\par Advanced care planning was discussed with patient and family.\par  [EKG obtained to assist in diagnosis and management of assessed problem(s)] : EKG obtained to assist in diagnosis and management of assessed problem(s)

## 2023-03-27 ENCOUNTER — LABORATORY RESULT (OUTPATIENT)
Age: 53
End: 2023-03-27

## 2023-03-29 ENCOUNTER — APPOINTMENT (OUTPATIENT)
Dept: CARDIOLOGY | Facility: CLINIC | Age: 53
End: 2023-03-29
Payer: COMMERCIAL

## 2023-03-29 PROCEDURE — 99214 OFFICE O/P EST MOD 30 MIN: CPT | Mod: 95

## 2023-03-29 NOTE — CARDIOLOGY SUMMARY
[de-identified] : TTE 12/2022: \par  Left ventricle systolic function appears moderately impaired; There is\par  severe hypokinesis of the mid to distal anterior and anteroseptal , and\par  apical segments. Remaining segments exhibit normal contractility.\par  Estimated left ventricular ejection fraction is 40 %.\par  The aortic valve is well visualized, appears calcified. Valve opening\par  seems to be normal.\par  Trace aortic regurgitation is present.\par  Normal appearing mitral valve structure.\par  Trace to mild mitral regurgitation is present.\par  Normal appearing tricuspid valve structure.\par  Trace tricuspid valve regurgitation is present.\par  [de-identified] : 12/18/2022 PCI\par Dominance: Right\par \par  Impression\par \par  Diagnostic Conclusions\par  1. pLAD culprit 99% with RICH 2 flow\par  2. Severe atherosclerosis of OM1\par  3. EF 30% with wrap around LAD territory. LVEDP 31 at start of case.\par \par  Interventional Conclusions\par \par  1. Successful LYLE placement to proximal LAD with IVUS used to guide post\par  dilation and stent optimization.\par  2. Heavily calcified vessel seen diffusely on IVUS with acceptable\par  proximal and distal plaque burden.\par  3. LVEDP post revascularization 19.\par \par  Recommendations\par \par  1. Continue aspirin 81 mg daily and ticagrelor 90 mg bid\par  2. Recommend aggressive medical management for CAD as per ACC/AHA\par  guidelines with risk factor modification including smoking cessation.\par  3. Gentle hydration overnight\par  4. TTE tomorrow in CCU\par  5. plan for stage PCI to Cx pending clinical course\par  6. findings relayed to patient, patient's wife, and CCU team\par \par 12/20/2022:\par \par  Successful LYLE placement placed to OM1 after cutting balloon angioplasty\par  in the setting of moderate calcium. IVUS revealed >7 mm2 area throughout\par  stent with proximal Cx with mild to moderate disease at 5.5 mm2 in native\par  tissue.\par \par  Recommendations\par \par  Maintain on DAPT for 12 months given post STEMI. Recommend close\par  cardiology follow up for optimal medical management and risk factor\par  modification. Findings relayed to patient and patient's wife.\par

## 2023-03-29 NOTE — DISCUSSION/SUMMARY
[FreeTextEntry1] : 52 year old male PMHx HTN, HLD, CVA/TIA, recent AWSTEMI, HFrEF (40%), ACC/AHA Stage C. Euvolemic on exam on post STEMI CAD medications along with GDMT. LDL at goal \par -c/w aspirin 81 mg and ticagrelor 90 mg bid\par -c/w entresto 24-26 mg. increased to 49-51.\par -c/w metoprolol 25 mg XL \par -start farixga 10 mg daily\par -continue statin \par -patient to follow up in 1 month for follow up\par \par Differential diagnosis and plan of care discussed with patient after the evaluation. \par Counseling on diet, exercise, and medication compliance was done.\par Counseling on smoking and alcohol cessation was offered when appropriate.\par Pain assessed and judicious use of narcotics when appropriate was discussed.\par Importance of fall prevention discussed.\par Advanced care planning was discussed with patient and family.\par

## 2023-03-29 NOTE — HISTORY OF PRESENT ILLNESS
[Home] : at home, [unfilled] , at the time of the visit. [Medical Office: ([facility])___] : at the medical office located in  [Verbal consent obtained from patient] : the patient, [unfilled] [FreeTextEntry1] : Luis Perry is a 52 year old male PMHx HTN, HLD, CVA/TIA, recent AWSTEMI who presents after recent hospitalization. 12/18 with anterior wall STEMI s/p 1 LYLE in setting of intermittent symptoms for a week or so. He underwent stage to his Cx. EF 40% with distal anterior wall hypokinesis. Reports no chest pain, sob at this time and he is back to work. He does state that he feels some dyspnea after exertion with regular activities. He has not returned to exercise regimen but does feel somewhat winded after walking long stretches. \par \par Recently put on entresto. Weight is heavier. No chest pain.K checked again within normal limits. Entresto to be raised today as BP remains elevated.  \par \par A1c 5.3\par LDL 49\par \par  [FreeTextEntry4] : Dr Reed

## 2023-05-02 ENCOUNTER — NON-APPOINTMENT (OUTPATIENT)
Age: 53
End: 2023-05-02

## 2023-05-02 ENCOUNTER — APPOINTMENT (OUTPATIENT)
Dept: CARDIOLOGY | Facility: CLINIC | Age: 53
End: 2023-05-02
Payer: COMMERCIAL

## 2023-05-02 VITALS
DIASTOLIC BLOOD PRESSURE: 91 MMHG | SYSTOLIC BLOOD PRESSURE: 146 MMHG | WEIGHT: 238 LBS | HEIGHT: 70 IN | BODY MASS INDEX: 34.07 KG/M2 | HEART RATE: 88 BPM | RESPIRATION RATE: 16 BRPM | OXYGEN SATURATION: 95 %

## 2023-05-02 PROCEDURE — 99215 OFFICE O/P EST HI 40 MIN: CPT | Mod: 25

## 2023-05-02 PROCEDURE — 93000 ELECTROCARDIOGRAM COMPLETE: CPT

## 2023-05-02 RX ORDER — ERGOCALCIFEROL 1.25 MG/1
1.25 MG CAPSULE, LIQUID FILLED ORAL
Qty: 4 | Refills: 0 | Status: DISCONTINUED | COMMUNITY
Start: 2018-05-05 | End: 2023-05-02

## 2023-05-02 NOTE — HISTORY OF PRESENT ILLNESS
[Home] : at home, [unfilled] , at the time of the visit. [Medical Office: ([facility])___] : at the medical office located in  [Verbal consent obtained from patient] : the patient, [unfilled] [FreeTextEntry1] : Luis Perry is a 52 year old male PMHx HTN, HLD, CVA/TIA, recent AWSTEMI who presents after recent hospitalization. 12/18 with anterior wall STEMI s/p 1 LYLE in setting of intermittent symptoms for a week or so. He underwent stage to his Cx. EF 40% with distal anterior wall hypokinesis. Reports no chest pain, sob at this time and he is back to work. He does state that he feels some dyspnea after exertion with regular activities. He has not returned to exercise regimen but does feel somewhat winded after walking long stretches. \par \par Recently put on entresto. Weight is heavier (195 to 238 in the setting of lack of exercise). No chest pain.K checked again within normal limits. \par \par A1c 5.3\par LDL 49\par \par  [FreeTextEntry4] : Dr Reed

## 2023-05-02 NOTE — DISCUSSION/SUMMARY
[FreeTextEntry1] : 52 year old male PMHx HTN, HLD, CVA/TIA, recent AWSTEMI, HFrEF (40%), ACC/AHA Stage C. Euvolemic on exam on post STEMI CAD medications along with GDMT. LDL at goal \par -c/w aspirin 81 mg and ticagrelor 90 mg bid\par -c/w entresto 49-51.\par -c/w metoprolol 50 mg XL PM with 25 mg AM added today \par -c/w farixga 10 mg daily\par -continue statin 80 mg \par -encouraged weight loss and exercise \par -patient to follow up in 3 months for follow up with transthoracic echo. \par \par Differential diagnosis and plan of care discussed with patient after the evaluation. \par Counseling on diet, exercise, and medication compliance was done.\par Counseling on smoking and alcohol cessation was offered when appropriate.\par Pain assessed and judicious use of narcotics when appropriate was discussed.\par Importance of fall prevention discussed.\par Advanced care planning was discussed with patient and family.\par  [EKG obtained to assist in diagnosis and management of assessed problem(s)] : EKG obtained to assist in diagnosis and management of assessed problem(s)

## 2023-05-02 NOTE — CARDIOLOGY SUMMARY
[de-identified] : TTE 12/2022: \par  Left ventricle systolic function appears moderately impaired; There is\par  severe hypokinesis of the mid to distal anterior and anteroseptal , and\par  apical segments. Remaining segments exhibit normal contractility.\par  Estimated left ventricular ejection fraction is 40 %.\par  The aortic valve is well visualized, appears calcified. Valve opening\par  seems to be normal.\par  Trace aortic regurgitation is present.\par  Normal appearing mitral valve structure.\par  Trace to mild mitral regurgitation is present.\par  Normal appearing tricuspid valve structure.\par  Trace tricuspid valve regurgitation is present.\par  [de-identified] : 12/18/2022 PCI\par Dominance: Right\par \par  Impression\par \par  Diagnostic Conclusions\par  1. pLAD culprit 99% with RICH 2 flow\par  2. Severe atherosclerosis of OM1\par  3. EF 30% with wrap around LAD territory. LVEDP 31 at start of case.\par \par  Interventional Conclusions\par \par  1. Successful LYLE placement to proximal LAD with IVUS used to guide post\par  dilation and stent optimization.\par  2. Heavily calcified vessel seen diffusely on IVUS with acceptable\par  proximal and distal plaque burden.\par  3. LVEDP post revascularization 19.\par \par  Recommendations\par \par  1. Continue aspirin 81 mg daily and ticagrelor 90 mg bid\par  2. Recommend aggressive medical management for CAD as per ACC/AHA\par  guidelines with risk factor modification including smoking cessation.\par  3. Gentle hydration overnight\par  4. TTE tomorrow in CCU\par  5. plan for stage PCI to Cx pending clinical course\par  6. findings relayed to patient, patient's wife, and CCU team\par \par 12/20/2022:\par \par  Successful LYLE placement placed to OM1 after cutting balloon angioplasty\par  in the setting of moderate calcium. IVUS revealed >7 mm2 area throughout\par  stent with proximal Cx with mild to moderate disease at 5.5 mm2 in native\par  tissue.\par \par  Recommendations\par \par  Maintain on DAPT for 12 months given post STEMI. Recommend close\par  cardiology follow up for optimal medical management and risk factor\par  modification. Findings relayed to patient and patient's wife.\par

## 2023-05-07 ENCOUNTER — EMERGENCY (EMERGENCY)
Facility: HOSPITAL | Age: 53
LOS: 0 days | Discharge: HOME | End: 2023-05-07
Attending: EMERGENCY MEDICINE
Payer: COMMERCIAL

## 2023-05-07 VITALS
OXYGEN SATURATION: 99 % | SYSTOLIC BLOOD PRESSURE: 133 MMHG | HEART RATE: 60 BPM | RESPIRATION RATE: 17 BRPM | TEMPERATURE: 98 F | DIASTOLIC BLOOD PRESSURE: 99 MMHG

## 2023-05-07 VITALS
RESPIRATION RATE: 18 BRPM | TEMPERATURE: 98 F | WEIGHT: 229.94 LBS | HEIGHT: 70 IN | DIASTOLIC BLOOD PRESSURE: 100 MMHG | SYSTOLIC BLOOD PRESSURE: 160 MMHG | HEART RATE: 77 BPM | OXYGEN SATURATION: 100 %

## 2023-05-07 DIAGNOSIS — Z90.49 ACQUIRED ABSENCE OF OTHER SPECIFIED PARTS OF DIGESTIVE TRACT: Chronic | ICD-10-CM

## 2023-05-07 DIAGNOSIS — Z79.82 LONG TERM (CURRENT) USE OF ASPIRIN: ICD-10-CM

## 2023-05-07 DIAGNOSIS — I10 ESSENTIAL (PRIMARY) HYPERTENSION: ICD-10-CM

## 2023-05-07 DIAGNOSIS — R06.02 SHORTNESS OF BREATH: ICD-10-CM

## 2023-05-07 DIAGNOSIS — Z95.5 PRESENCE OF CORONARY ANGIOPLASTY IMPLANT AND GRAFT: ICD-10-CM

## 2023-05-07 DIAGNOSIS — R07.9 CHEST PAIN, UNSPECIFIED: ICD-10-CM

## 2023-05-07 DIAGNOSIS — Z90.89 ACQUIRED ABSENCE OF OTHER ORGANS: ICD-10-CM

## 2023-05-07 DIAGNOSIS — R11.2 NAUSEA WITH VOMITING, UNSPECIFIED: ICD-10-CM

## 2023-05-07 DIAGNOSIS — E78.5 HYPERLIPIDEMIA, UNSPECIFIED: ICD-10-CM

## 2023-05-07 DIAGNOSIS — Z87.442 PERSONAL HISTORY OF URINARY CALCULI: ICD-10-CM

## 2023-05-07 DIAGNOSIS — Z90.49 ACQUIRED ABSENCE OF OTHER SPECIFIED PARTS OF DIGESTIVE TRACT: ICD-10-CM

## 2023-05-07 DIAGNOSIS — K80.20 CALCULUS OF GALLBLADDER WITHOUT CHOLECYSTITIS WITHOUT OBSTRUCTION: ICD-10-CM

## 2023-05-07 DIAGNOSIS — Z90.89 ACQUIRED ABSENCE OF OTHER ORGANS: Chronic | ICD-10-CM

## 2023-05-07 DIAGNOSIS — Z88.0 ALLERGY STATUS TO PENICILLIN: ICD-10-CM

## 2023-05-07 DIAGNOSIS — I25.2 OLD MYOCARDIAL INFARCTION: ICD-10-CM

## 2023-05-07 DIAGNOSIS — Z86.73 PERSONAL HISTORY OF TRANSIENT ISCHEMIC ATTACK (TIA), AND CEREBRAL INFARCTION WITHOUT RESIDUAL DEFICITS: ICD-10-CM

## 2023-05-07 DIAGNOSIS — Z98.890 OTHER SPECIFIED POSTPROCEDURAL STATES: Chronic | ICD-10-CM

## 2023-05-07 DIAGNOSIS — I25.10 ATHEROSCLEROTIC HEART DISEASE OF NATIVE CORONARY ARTERY WITHOUT ANGINA PECTORIS: ICD-10-CM

## 2023-05-07 LAB
ALBUMIN SERPL ELPH-MCNC: 4.3 G/DL — SIGNIFICANT CHANGE UP (ref 3.3–5)
ALP SERPL-CCNC: 66 U/L — SIGNIFICANT CHANGE UP (ref 40–120)
ALT FLD-CCNC: 44 U/L — SIGNIFICANT CHANGE UP (ref 12–78)
ANION GAP SERPL CALC-SCNC: 5 MMOL/L — SIGNIFICANT CHANGE UP (ref 5–17)
APTT BLD: 32.5 SEC — SIGNIFICANT CHANGE UP (ref 27.5–35.5)
AST SERPL-CCNC: 31 U/L — SIGNIFICANT CHANGE UP (ref 15–37)
BASOPHILS # BLD AUTO: 0.04 K/UL — SIGNIFICANT CHANGE UP (ref 0–0.2)
BASOPHILS NFR BLD AUTO: 0.4 % — SIGNIFICANT CHANGE UP (ref 0–2)
BILIRUB SERPL-MCNC: 0.8 MG/DL — SIGNIFICANT CHANGE UP (ref 0.2–1.2)
BUN SERPL-MCNC: 17 MG/DL — SIGNIFICANT CHANGE UP (ref 7–23)
CALCIUM SERPL-MCNC: 9.5 MG/DL — SIGNIFICANT CHANGE UP (ref 8.5–10.1)
CHLORIDE SERPL-SCNC: 103 MMOL/L — SIGNIFICANT CHANGE UP (ref 96–108)
CO2 SERPL-SCNC: 29 MMOL/L — SIGNIFICANT CHANGE UP (ref 22–31)
CREAT SERPL-MCNC: 1.08 MG/DL — SIGNIFICANT CHANGE UP (ref 0.5–1.3)
EGFR: 81 ML/MIN/1.73M2 — SIGNIFICANT CHANGE UP
EOSINOPHIL # BLD AUTO: 0.18 K/UL — SIGNIFICANT CHANGE UP (ref 0–0.5)
EOSINOPHIL NFR BLD AUTO: 2 % — SIGNIFICANT CHANGE UP (ref 0–6)
GLUCOSE SERPL-MCNC: 110 MG/DL — HIGH (ref 70–99)
HCT VFR BLD CALC: 49.9 % — SIGNIFICANT CHANGE UP (ref 39–50)
HGB BLD-MCNC: 17 G/DL — SIGNIFICANT CHANGE UP (ref 13–17)
IMM GRANULOCYTES NFR BLD AUTO: 0.4 % — SIGNIFICANT CHANGE UP (ref 0–0.9)
INR BLD: 1.11 RATIO — SIGNIFICANT CHANGE UP (ref 0.88–1.16)
LYMPHOCYTES # BLD AUTO: 2.22 K/UL — SIGNIFICANT CHANGE UP (ref 1–3.3)
LYMPHOCYTES # BLD AUTO: 24.6 % — SIGNIFICANT CHANGE UP (ref 13–44)
MCHC RBC-ENTMCNC: 29 PG — SIGNIFICANT CHANGE UP (ref 27–34)
MCHC RBC-ENTMCNC: 34.1 GM/DL — SIGNIFICANT CHANGE UP (ref 32–36)
MCV RBC AUTO: 85 FL — SIGNIFICANT CHANGE UP (ref 80–100)
MONOCYTES # BLD AUTO: 0.81 K/UL — SIGNIFICANT CHANGE UP (ref 0–0.9)
MONOCYTES NFR BLD AUTO: 9 % — SIGNIFICANT CHANGE UP (ref 2–14)
NEUTROPHILS # BLD AUTO: 5.74 K/UL — SIGNIFICANT CHANGE UP (ref 1.8–7.4)
NEUTROPHILS NFR BLD AUTO: 63.6 % — SIGNIFICANT CHANGE UP (ref 43–77)
PLATELET # BLD AUTO: 214 K/UL — SIGNIFICANT CHANGE UP (ref 150–400)
POTASSIUM SERPL-MCNC: 3.6 MMOL/L — SIGNIFICANT CHANGE UP (ref 3.5–5.3)
POTASSIUM SERPL-SCNC: 3.6 MMOL/L — SIGNIFICANT CHANGE UP (ref 3.5–5.3)
PROT SERPL-MCNC: 7.6 GM/DL — SIGNIFICANT CHANGE UP (ref 6–8.3)
PROTHROM AB SERPL-ACNC: 12.9 SEC — SIGNIFICANT CHANGE UP (ref 10.5–13.4)
RBC # BLD: 5.87 M/UL — HIGH (ref 4.2–5.8)
RBC # FLD: 13.6 % — SIGNIFICANT CHANGE UP (ref 10.3–14.5)
SODIUM SERPL-SCNC: 137 MMOL/L — SIGNIFICANT CHANGE UP (ref 135–145)
TROPONIN I, HIGH SENSITIVITY RESULT: 27.71 NG/L — SIGNIFICANT CHANGE UP
TROPONIN I, HIGH SENSITIVITY RESULT: 30.46 NG/L — SIGNIFICANT CHANGE UP
WBC # BLD: 9.03 K/UL — SIGNIFICANT CHANGE UP (ref 3.8–10.5)
WBC # FLD AUTO: 9.03 K/UL — SIGNIFICANT CHANGE UP (ref 3.8–10.5)

## 2023-05-07 PROCEDURE — 36415 COLL VENOUS BLD VENIPUNCTURE: CPT

## 2023-05-07 PROCEDURE — 93010 ELECTROCARDIOGRAM REPORT: CPT

## 2023-05-07 PROCEDURE — 85730 THROMBOPLASTIN TIME PARTIAL: CPT

## 2023-05-07 PROCEDURE — 93005 ELECTROCARDIOGRAM TRACING: CPT

## 2023-05-07 PROCEDURE — 85610 PROTHROMBIN TIME: CPT

## 2023-05-07 PROCEDURE — 80053 COMPREHEN METABOLIC PANEL: CPT

## 2023-05-07 PROCEDURE — 71045 X-RAY EXAM CHEST 1 VIEW: CPT

## 2023-05-07 PROCEDURE — 85025 COMPLETE CBC W/AUTO DIFF WBC: CPT

## 2023-05-07 PROCEDURE — 99285 EMERGENCY DEPT VISIT HI MDM: CPT | Mod: 25

## 2023-05-07 PROCEDURE — 84484 ASSAY OF TROPONIN QUANT: CPT

## 2023-05-07 PROCEDURE — 71045 X-RAY EXAM CHEST 1 VIEW: CPT | Mod: 26

## 2023-05-07 PROCEDURE — 99285 EMERGENCY DEPT VISIT HI MDM: CPT

## 2023-05-07 NOTE — ED PROVIDER NOTE - PATIENT PORTAL LINK FT
You can access the FollowMyHealth Patient Portal offered by [facility] by registering at the following website: [email or web address]. By joining [employer]’s FollowMyHealth portal, you will also be able to view your health information using other applications (apps) compatible with our system.

## 2023-05-07 NOTE — ED PROVIDER NOTE - NS ED ROS FT
Gen: No fever, normal appetite  Eyes: No eye irritation or discharge  ENT: No ear pain, congestion, sore throat  Resp: (+) SOB  Cardiovascular: (+) CP  Gastroenteric: (+) nausea/vomiting  :  No change in urine output; no dysuria  MS: No joint or muscle pain  Skin: No rashes  Neuro: No headache; no abnormal movements  Remainder negative, except as per the HPI

## 2023-05-07 NOTE — ED PROVIDER NOTE - OBJECTIVE STATEMENT
52 y/o male with PMHx of HTN, HLD, TIA, CVA, CAD s/p 2 stents presents to the ED for CP, SOB, vomiting starting at 10AM today. Pt went to [place] on Friday 5/5 and came back this morning. While packing up to leave, pt began experiencing symptoms. Reports symptoms are similar to MI he had Dec 2022 where he had 2 stents placed. Symptoms improved but not fully resolved. Recently increased metoprolol--only take 25 mg metoprolol instead of 50 mg today.  Cardiologist- Dr. Reed

## 2023-05-07 NOTE — ED PROVIDER NOTE - CARE PROVIDER_API CALL
Surya Reed)  Cardiology; Internal Medicine; Interventional Cardiology  [address] [facility], [address]  [place], [place] [id number]  Phone: [phone]  Fax: [phone]  Follow Up Time:

## 2023-05-07 NOTE — ED ADULT NURSE NOTE - OBJECTIVE STATEMENT
Pt in ED c/o of chest pain radiating to L arm, N/V, dizziness, diaphoresis, SOB.  Symptoms started this morning.  Pt took baby aspirin and is on AC Brilenta. Pt has Hx of 2 stents placed in December 2022 for MI.  Pt breathing symmetrical SOB, placed on 2L NC, EKG completed, cardiac monitoring in place, #20 IV inserted into L AC, bloods drawn and sent to lab.

## 2023-05-07 NOTE — ED PROVIDER NOTE - CCCP TRG CHIEF CMPLNT
Birth Registry interview complete. Birth Certification Confirmation sheet signed and paperwork collected by Birth Registry.   chest pain

## 2023-05-07 NOTE — ED PROVIDER NOTE - CLINICAL SUMMARY MEDICAL DECISION MAKING FREE TEXT BOX
Pt with recent MI and CP similar to MI. First trop neg. EKG without acute ischemic changes. Will check second troponin. Will touch base with Dr. Reed for possible admission for outpatient followup. Pt with recent MI and CP similar to MI. First trop neg. EKG without acute ischemic changes. Will check second troponin. Will touch base with Dr. Reed for possible admission for outpatient followup.    D/w Kehinde Reed 2 neg, lelia chen dc.

## 2023-05-07 NOTE — ED ADULT TRIAGE NOTE - CHIEF COMPLAINT QUOTE
Pt came into the ED with c/o chest tightness starting this morning. Pt reports pain started this morning after coming home from [place]. Pt had recent MI in December and had 2 stents placed by MD Reed. Pt states " the pain began and it was radiating down my left arm, I vomited and was sweaty". Pt's wife states "he looked almost grey". Pt at this time c/o mild chest tightness, no pain radiating down his arm and no nausea currently. Pt sent for EKG. Pt took one baby aspirin PTA. Pt denies SOB or dizziness.

## 2023-05-07 NOTE — ED PROVIDER NOTE - NSFOLLOWUPINSTRUCTIONS_ED_ALL_ED_FT
English    Nonspecific Chest Pain, Adult  Chest pain is an uncomfortable, tight, or painful feeling in the chest. The pain can feel like a crushing, aching, or squeezing pressure. A person can feel a burning or tingling sensation. Chest pain can also be felt in your back, neck, jaw, shoulder, or arm. This pain can be worse when you move, sneeze, or take a deep breath.    Chest pain can be caused by a condition that is life-threatening. This must be treated right away. It can also be caused by something that is not life-threatening. If you have chest pain, it can be hard to know the difference, so it is important to get help right away to make sure that you do not have a serious condition.    Some life-threatening causes of chest pain include:  Heart attack.  A tear in the body's main blood vessel (aortic dissection).  Inflammation around your heart (pericarditis).  A problem in the lungs, such as a blood clot (pulmonary embolism) or a collapsed lung (pneumothorax).  Some non life-threatening causes of chest pain include:  Heartburn.  Anxiety or stress.  Damage to the bones, muscles, and cartilage that make up your chest wall.  Pneumonia or bronchitis.  Shingles infection (varicella-zoster virus).  Your chest pain may come and go. It may also be constant. Your health care provider will do tests and other studies to find the cause of your pain. Treatment will depend on the cause of your chest pain.    Follow these instructions at home:  Medicines    Take over-the-counter and prescription medicines only as told by your health care provider.  If you were prescribed an antibiotic medicine, take it as told by your health care provider. Do not stop taking the antibiotic even if you start to feel better.  Activity    Avoid any activities that cause chest pain.  Do not lift anything that is heavier than 10 lb (4.5 kg), or the limit that you are told, until your health care provider says that it is safe.  Rest as directed by your health care provider.  Return to your normal activities only as told by your health care provider. Ask your health care provider what activities are safe for you.  Lifestyle    A plate along with examples of foods in a healthy diet.  Silhouette of a person sitting on the floor doing yoga.  Do not use any products that contain nicotine or tobacco, such as cigarettes, e-cigarettes, and chewing tobacco. If you need help quitting, ask your health care provider.  Do not drink alcohol.  Make healthy lifestyle changes as recommended. These may include:  Getting regular exercise. Ask your health care provider to suggest some exercises that are safe for you.  Eating a heart-healthy diet. This includes plenty of fresh fruits and vegetables, whole grains, low-fat (lean) protein, and low-fat dairy products. A dietitian can help you find healthy eating options.  Maintaining a healthy weight.  Managing any other health conditions you may have, such as high blood pressure (hypertension) or diabetes.  Reducing stress, such as with yoga or relaxation techniques.  General instructions    Pay attention to any changes in your symptoms.  It is up to you to get the results of any tests that were done. Ask your health care provider, or the department that is doing the tests, when your results will be ready.  Keep all follow-up visits as told by your health care provider. This is important.  You may be asked to go for further testing if your chest pain does not go away.  Contact a health care provider if:  Your chest pain does not go away.  You feel depressed.  You have a fever.  You notice changes in your symptoms or develop new symptoms.  Get help right away if:  Your chest pain gets worse.  You have a cough that gets worse, or you cough up blood.  You have severe pain in your abdomen.  You faint.  You have sudden, unexplained chest discomfort.  You have sudden, unexplained discomfort in your arms, back, neck, or jaw.  You have shortness of breath at any time.  You suddenly start to sweat, or your skin gets clammy.  You feel nausea or you vomit.  You suddenly feel lightheaded or dizzy.  You have severe weakness, or unexplained weakness or fatigue.  Your heart begins to beat quickly, or it feels like it is skipping beats.  These symptoms may represent a serious problem that is an emergency. Do not wait to see if the symptoms will go away. Get medical help right away. Call your local emergency services (911 in the [place] Do not drive yourself to the hospital.    Summary  Chest pain can be caused by a condition that is serious and requires urgent treatment. It may also be caused by something that is not life-threatening.  Your health care provider may do lab tests and other studies to find the cause of your pain.  Follow your health care provider's instructions on taking medicines, making lifestyle changes, and getting emergency treatment if symptoms become worse.  Keep all follow-up visits as told by your health care provider. This includes visits for any further testing if your chest pain does not go away.  This information is not intended to replace advice given to you by your health care provider. Make sure you discuss any questions you have with your health care provider.    Document Revised: 03/03/2022 Document Reviewed: 03/03/2022  Elsevier Patient Education © 2023 [employer]

## 2023-05-10 ENCOUNTER — NON-APPOINTMENT (OUTPATIENT)
Age: 53
End: 2023-05-10

## 2023-05-10 ENCOUNTER — APPOINTMENT (OUTPATIENT)
Dept: CARDIOLOGY | Facility: CLINIC | Age: 53
End: 2023-05-10
Payer: COMMERCIAL

## 2023-05-10 VITALS
HEIGHT: 70 IN | BODY MASS INDEX: 32.21 KG/M2 | HEART RATE: 73 BPM | SYSTOLIC BLOOD PRESSURE: 142 MMHG | DIASTOLIC BLOOD PRESSURE: 98 MMHG | WEIGHT: 225 LBS | OXYGEN SATURATION: 98 %

## 2023-05-10 PROCEDURE — 93000 ELECTROCARDIOGRAM COMPLETE: CPT

## 2023-05-10 PROCEDURE — 99215 OFFICE O/P EST HI 40 MIN: CPT | Mod: 25

## 2023-05-10 NOTE — CARDIOLOGY SUMMARY
[de-identified] : TTE 12/2022: \par  Left ventricle systolic function appears moderately impaired; There is\par  severe hypokinesis of the mid to distal anterior and anteroseptal , and\par  apical segments. Remaining segments exhibit normal contractility.\par  Estimated left ventricular ejection fraction is 40 %.\par  The aortic valve is well visualized, appears calcified. Valve opening\par  seems to be normal.\par  Trace aortic regurgitation is present.\par  Normal appearing mitral valve structure.\par  Trace to mild mitral regurgitation is present.\par  Normal appearing tricuspid valve structure.\par  Trace tricuspid valve regurgitation is present.\par  [de-identified] : 12/18/2022 PCI\par Dominance: Right\par \par  Impression\par \par  Diagnostic Conclusions\par  1. pLAD culprit 99% with RICH 2 flow\par  2. Severe atherosclerosis of OM1\par  3. EF 30% with wrap around LAD territory. LVEDP 31 at start of case.\par \par  Interventional Conclusions\par \par  1. Successful LYLE placement to proximal LAD with IVUS used to guide post\par  dilation and stent optimization.\par  2. Heavily calcified vessel seen diffusely on IVUS with acceptable\par  proximal and distal plaque burden.\par  3. LVEDP post revascularization 19.\par \par  Recommendations\par \par  1. Continue aspirin 81 mg daily and ticagrelor 90 mg bid\par  2. Recommend aggressive medical management for CAD as per ACC/AHA\par  guidelines with risk factor modification including smoking cessation.\par  3. Gentle hydration overnight\par  4. TTE tomorrow in CCU\par  5. plan for stage PCI to Cx pending clinical course\par  6. findings relayed to patient, patient's wife, and CCU team\par \par 12/20/2022:\par \par  Successful LYLE placement placed to OM1 after cutting balloon angioplasty\par  in the setting of moderate calcium. IVUS revealed >7 mm2 area throughout\par  stent with proximal Cx with mild to moderate disease at 5.5 mm2 in native\par  tissue.\par \par  Recommendations\par \par  Maintain on DAPT for 12 months given post STEMI. Recommend close\par  cardiology follow up for optimal medical management and risk factor\par  modification. Findings relayed to patient and patient's wife.\par

## 2023-05-10 NOTE — DISCUSSION/SUMMARY
[FreeTextEntry1] : 52 year old male PMHx HTN, HLD, CVA/TIA, recent AWSTEMI, HFrEF (40%), ACC/AHA Stage C. Euvolemic on exam on post STEMI CAD medications along with GDMT. LDL at goal \par -c/w aspirin 81 mg and ticagrelor 90 mg bid\par -c/w entresto- increase to  given elevated afterload\par -c/w metoprolol 50 mg XL PM with 25 mg AM\par -c/w farixga 10 mg daily\par -continue statin 80 mg \par -plan for exercise nuclear stress test to risk stratify given recent symptoms \par -encouraged weight loss and exercise \par -patient to follow up in 1 month\par \par Differential diagnosis and plan of care discussed with patient after the evaluation. \par Counseling on diet, exercise, and medication compliance was done.\par Counseling on smoking and alcohol cessation was offered when appropriate.\par Pain assessed and judicious use of narcotics when appropriate was discussed.\par Importance of fall prevention discussed.\par Advanced care planning was discussed with patient and family.\par

## 2023-05-10 NOTE — HISTORY OF PRESENT ILLNESS
[FreeTextEntry1] : Luis Perry is a 52 year old male PMHx HTN, HLD, CVA/TIA, recent AWSTEMI who presents after recent hospitalization. 12/18 with anterior wall STEMI s/p 1 LYLE in setting of intermittent symptoms for a week or so. He underwent stage to his Cx. EF 40% with distal anterior wall hypokinesis. Reports no chest pain, sob at this time and he is back to work. He does state that he feels some dyspnea after exertion with regular activities. He has not returned to exercise regimen but does feel somewhat winded after walking long stretches. \par \par Recently put on entresto. Weight is heavier (195 to 238 in the setting of lack of exercise). No chest pain.K checked again within normal limits. \par \par Recent hospital visit for chest pain 5/7/2023. Trop negative, EKG benign. Here for follow up. \par Chest pain, SOB occurred in [place], reminiscent of prior MI. Then had N/V. Flew out of [place], through [facility]. Once landed, still ongoing. Prompted ER visit. 185/110 when there. \par \par A1c 5.3\par LDL 49\par \par

## 2023-06-08 ENCOUNTER — OUTPATIENT (OUTPATIENT)
Dept: OUTPATIENT SERVICES | Facility: HOSPITAL | Age: 53
LOS: 1 days | End: 2023-06-08
Payer: COMMERCIAL

## 2023-06-08 ENCOUNTER — RESULT REVIEW (OUTPATIENT)
Age: 53
End: 2023-06-08

## 2023-06-08 DIAGNOSIS — Z90.49 ACQUIRED ABSENCE OF OTHER SPECIFIED PARTS OF DIGESTIVE TRACT: Chronic | ICD-10-CM

## 2023-06-08 DIAGNOSIS — I50.40 UNSPECIFIED COMBINED SYSTOLIC (CONGESTIVE) AND DIASTOLIC (CONGESTIVE) HEART FAILURE: ICD-10-CM

## 2023-06-08 DIAGNOSIS — Z90.89 ACQUIRED ABSENCE OF OTHER ORGANS: Chronic | ICD-10-CM

## 2023-06-08 DIAGNOSIS — Z98.890 OTHER SPECIFIED POSTPROCEDURAL STATES: Chronic | ICD-10-CM

## 2023-06-08 PROCEDURE — 78452 HT MUSCLE IMAGE SPECT MULT: CPT | Mod: 26

## 2023-06-08 PROCEDURE — 78452 HT MUSCLE IMAGE SPECT MULT: CPT

## 2023-06-08 PROCEDURE — 93016 CV STRESS TEST SUPVJ ONLY: CPT

## 2023-06-08 PROCEDURE — A9500: CPT

## 2023-06-08 PROCEDURE — 93018 CV STRESS TEST I&R ONLY: CPT

## 2023-06-08 PROCEDURE — 93017 CV STRESS TEST TRACING ONLY: CPT

## 2023-06-08 NOTE — CHART NOTE - NSCHARTNOTEFT_GEN_A_CORE
Exercise nuclear Stress Test Report    HELEN MONTERO 53yM  MRN: [id number]  : 68  Admit: 23      A 12 lead ECG was recorded every minute & BP was recorded throughout the test.    Resting ECG: Sinus rhythm with TWI in AVL  Peak iECG: No additional changes noted in recovery  Chest pain: none    Conclusion:    See myocardial perfusion scan report.

## 2023-06-09 DIAGNOSIS — I50.40 UNSPECIFIED COMBINED SYSTOLIC (CONGESTIVE) AND DIASTOLIC (CONGESTIVE) HEART FAILURE: ICD-10-CM

## 2023-06-14 ENCOUNTER — APPOINTMENT (OUTPATIENT)
Dept: CARDIOLOGY | Facility: CLINIC | Age: 53
End: 2023-06-14
Payer: COMMERCIAL

## 2023-06-14 VITALS
HEIGHT: 70 IN | HEART RATE: 72 BPM | SYSTOLIC BLOOD PRESSURE: 130 MMHG | OXYGEN SATURATION: 96 % | BODY MASS INDEX: 32.93 KG/M2 | RESPIRATION RATE: 16 BRPM | DIASTOLIC BLOOD PRESSURE: 86 MMHG | WEIGHT: 230 LBS

## 2023-06-14 PROCEDURE — 93000 ELECTROCARDIOGRAM COMPLETE: CPT

## 2023-06-14 PROCEDURE — 99215 OFFICE O/P EST HI 40 MIN: CPT | Mod: 25

## 2023-06-14 NOTE — CARDIOLOGY SUMMARY
[de-identified] : 6/8/2023\par IMPRESSION: Abnormal SPECT Myocardial Perfusion Imaging at suboptimal workload - pt achieved 76% of target heart rate less than 85% target.\par There is a moderate in size, severe in intensity nonreversible defect in the anteroseptal region in the apical segments likely due to myocardial infarct.  There is a small (1-2 segments) amount of periinfarct ischemia in this region.\par Reduced left ventricular contractility with an ejection fraction of 43% (Normal: 50% or greater).\par Regional wall motion abnormalities are present: the anteroseptal region in the apical segments are hypokinetic with reduced wall thickening.\par The left ventricle is normal in size.\par The transient ischemic dilation (TID) ratio was normal.\par \par  \par \par  Ordered by: KARMEN PARSONS       Collected/Examined: 08Jun2023 10:45AM       \par Verification Required       Stage: Final       \par  Performed at: [facility]       Resulted: 09Jun2023 10:20AM       Last Updated: 09Jun2023 04:33PM       Accession: [id number]       \par   [de-identified] : TTE 12/2022: \par  Left ventricle systolic function appears moderately impaired; There is\par  severe hypokinesis of the mid to distal anterior and anteroseptal , and\par  apical segments. Remaining segments exhibit normal contractility.\par  Estimated left ventricular ejection fraction is 40 %.\par  The aortic valve is well visualized, appears calcified. Valve opening\par  seems to be normal.\par  Trace aortic regurgitation is present.\par  Normal appearing mitral valve structure.\par  Trace to mild mitral regurgitation is present.\par  Normal appearing tricuspid valve structure.\par  Trace tricuspid valve regurgitation is present.\par  [de-identified] : 12/18/2022 PCI\par Dominance: Right\par \par  Impression\par \par  Diagnostic Conclusions\par  1. pLAD culprit 99% with RICH 2 flow\par  2. Severe atherosclerosis of OM1\par  3. EF 30% with wrap around LAD territory. LVEDP 31 at start of case.\par \par  Interventional Conclusions\par \par  1. Successful LYLE placement to proximal LAD with IVUS used to guide post\par  dilation and stent optimization.\par  2. Heavily calcified vessel seen diffusely on IVUS with acceptable\par  proximal and distal plaque burden.\par  3. LVEDP post revascularization 19.\par \par  Recommendations\par \par  1. Continue aspirin 81 mg daily and ticagrelor 90 mg bid\par  2. Recommend aggressive medical management for CAD as per ACC/AHA\par  guidelines with risk factor modification including smoking cessation.\par  3. Gentle hydration overnight\par  4. TTE tomorrow in CCU\par  5. plan for stage PCI to Cx pending clinical course\par  6. findings relayed to patient, patient's wife, and CCU team\par \par 12/20/2022:\par \par  Successful LYLE placement placed to OM1 after cutting balloon angioplasty\par  in the setting of moderate calcium. IVUS revealed >7 mm2 area throughout\par  stent with proximal Cx with mild to moderate disease at 5.5 mm2 in native\par  tissue.\par \par  Recommendations\par \par  Maintain on DAPT for 12 months given post STEMI. Recommend close\par  cardiology follow up for optimal medical management and risk factor\par  modification. Findings relayed to patient and patient's wife.\par

## 2023-06-14 NOTE — DISCUSSION/SUMMARY
[FreeTextEntry1] : 52 year old male PMHx HTN, HLD, CVA/TIA, recent AWSTEMI, HFrEF (40%), ACC/AHA Stage C. Euvolemic on exam on post STEMI CAD medications along with GDMT. LDL at goal \par -c/w aspirin 81 mg and ticagrelor 90 mg bid\par -c/w entresto- increase to  given elevated afterload\par -c/w metoprolol 50 mg XL PM with 25 mg AM\par -c/w farixga 10 mg daily\par -continue statin 80 mg \par -reviewed results of stress test with patient, it is ok to slowly increase exercise regimen as tolerated. \par -encouraged pt to continue with weight loss goals, discussed measuring waist circumference as well as keeping track of weight.  \par -patient to follow up in 3 month\par \par Differential diagnosis and plan of care discussed with patient after the evaluation. \par Counseling on diet, exercise, and medication compliance was done.\par Counseling on smoking and alcohol cessation was offered when appropriate.\par Pain assessed and judicious use of narcotics when appropriate was discussed.\par Importance of fall prevention discussed.\par Advanced care planning was discussed with patient and family.\par  [EKG obtained to assist in diagnosis and management of assessed problem(s)] : EKG obtained to assist in diagnosis and management of assessed problem(s)

## 2023-06-14 NOTE — HISTORY OF PRESENT ILLNESS
[FreeTextEntry1] : Luis Perry is a 52 year old male PMHx HTN, HLD, CVA/TIA, recent AWSTEMI who presents after recent hospitalization. 12/18 with anterior wall STEMI s/p 1 LYLE in setting of intermittent symptoms for a week or so. He underwent stage to his Cx. EF 40% with distal anterior wall hypokinesis. Reports no chest pain, sob at this time and he is back to work. He does state that he feels some dyspnea after exertion with regular activities. He has not returned to exercise regimen but does feel somewhat winded after walking long stretches. \par Recently put on entresto. Weight is heavier (195 to 238 in the setting of lack of exercise). No chest pain.K checked again within normal limits. \par Recent hospital visit for chest pain 5/7/2023. Trop negative, EKG benign. Here for follow up. \par Chest pain, SOB occurred in [place], reminiscent of prior MI. Then had N/V. Flew out of [place], through [facility]. Once landed, still ongoing. Prompted ER visit. 185/110 when there. \par \par Patient following up after exercise stress test on 6/8/23. He has been feeling well, slowly increasing his exercise at the gym and would like to begin a new program, denies any recent occurrence of chest pain or sob. Weight is up by 5lbs to 230lbs which he states he is unsure how since he has been eating healthy and working out more. Overall he has been feeling well. \par \par

## 2023-07-28 NOTE — ED PROVIDER NOTE - BIRTH SEX
Caller: Domingo Ruiz    Relationship: Self    Best call back number: [phone]     Requested Prescriptions:   Requested Prescriptions     Pending Prescriptions Disp Refills    acetaZOLAMIDE (DIAMOX) 250 MG tablet 60 tablet 1     Sig: Take 1 tablet by mouth 2 (Two) Times a Day. Appointment needed for further refills.    potassium chloride 10 MEQ CR tablet 30 tablet 5     Sig: Take 1 tablet by mouth Daily.    citalopram (CeleXA) 40 MG tablet 30 tablet 0     Sig: Take 1 tablet by mouth Daily. Appointment needed for further refills.    spironolactone (ALDACTONE) 50 MG tablet 30 tablet 5     Sig: Take 1 tablet by mouth Daily.    desvenlafaxine (PRISTIQ) 50 MG 24 hr tablet 30 tablet 0     Sig: Take 1 tablet by mouth Daily. Appointment needed for further refills.        Pharmacy where request should be sent: [facility] [id number] [address] [phone] [place] - [phone]      Last office visit with prescribing clinician: 7/22/2022   Last telemedicine visit with prescribing clinician: Visit date not found   Next office visit with prescribing clinician: Visit date not found     Additional details provided by patient: OUT OF REFILLS AND MEDICATION    Does the patient have less than a 3 day supply:  [x] Yes  [] No    Would you like a call back once the refill request has been completed: [] Yes [x] No    If the office needs to give you a call back, can they leave a voicemail: [] Yes [x] No    Carmen Edwards, PCT   07/28/23 11:33 EDT   
Male

## 2023-08-22 ENCOUNTER — OUTPATIENT (OUTPATIENT)
Dept: OUTPATIENT SERVICES | Facility: HOSPITAL | Age: 53
LOS: 1 days | End: 2023-08-22
Payer: COMMERCIAL

## 2023-08-22 ENCOUNTER — APPOINTMENT (OUTPATIENT)
Dept: CARDIOLOGY | Facility: CLINIC | Age: 53
End: 2023-08-22
Payer: COMMERCIAL

## 2023-08-22 VITALS
HEART RATE: 71 BPM | BODY MASS INDEX: 32.93 KG/M2 | OXYGEN SATURATION: 96 % | WEIGHT: 230 LBS | HEIGHT: 70 IN | DIASTOLIC BLOOD PRESSURE: 92 MMHG | SYSTOLIC BLOOD PRESSURE: 150 MMHG

## 2023-08-22 DIAGNOSIS — I50.40 UNSPECIFIED COMBINED SYSTOLIC (CONGESTIVE) AND DIASTOLIC (CONGESTIVE) HEART FAILURE: ICD-10-CM

## 2023-08-22 DIAGNOSIS — Z90.49 ACQUIRED ABSENCE OF OTHER SPECIFIED PARTS OF DIGESTIVE TRACT: Chronic | ICD-10-CM

## 2023-08-22 DIAGNOSIS — Z98.890 OTHER SPECIFIED POSTPROCEDURAL STATES: Chronic | ICD-10-CM

## 2023-08-22 DIAGNOSIS — Z90.89 ACQUIRED ABSENCE OF OTHER ORGANS: Chronic | ICD-10-CM

## 2023-08-22 PROCEDURE — 93306 TTE W/DOPPLER COMPLETE: CPT | Mod: 26

## 2023-08-22 PROCEDURE — 93000 ELECTROCARDIOGRAM COMPLETE: CPT

## 2023-08-22 PROCEDURE — 99215 OFFICE O/P EST HI 40 MIN: CPT | Mod: 25

## 2023-08-22 PROCEDURE — 93306 TTE W/DOPPLER COMPLETE: CPT

## 2023-08-22 NOTE — DISCUSSION/SUMMARY
[FreeTextEntry1] : 52 year old male PMHx HTN, HLD, CVA/TIA, recent AWSTEMI, HFrEF (40%), ACC/AHA Stage C. Euvolemic on exam on post STEMI CAD medications along with GDMT. LDL at goal  -c/w aspirin 81 mg and ticagrelor 90 mg bid -c/w entresto- increase to  given elevated afterload -c/w metoprolol 50 mg XL PM and AM, for total of 100 mg XL daily  -c/w farixga 10 mg daily -continue statin 80 mg  -reviewed results of stress test with patient, it is ok to slowly increase exercise regimen as tolerated especially given improved EF on TTE -encouraged pt to continue with weight loss goals, discussed measuring waist circumference as well as keeping track of weight.   -patient to follow up in 3-6 month  Differential diagnosis and plan of care discussed with patient after the evaluation.  Counseling on diet, exercise, and medication compliance was done. Counseling on smoking and alcohol cessation was offered when appropriate. Pain assessed and judicious use of narcotics when appropriate was discussed. Importance of fall prevention discussed. Advanced care planning was discussed with patient and family.  [EKG obtained to assist in diagnosis and management of assessed problem(s)] : EKG obtained to assist in diagnosis and management of assessed problem(s)

## 2023-08-22 NOTE — HISTORY OF PRESENT ILLNESS
[FreeTextEntry1] : Luis Perry is a 52 year old male PMHx HTN, HLD, CVA/TIA, recent AWSTEMI who presents after recent hospitalization. 12/18 with anterior wall STEMI s/p 1 LYLE in setting of intermittent symptoms for a week or so. He underwent stage to his Cx. EF 40% with distal anterior wall hypokinesis. Reports no chest pain, sob at this time and he is back to work. He does state that he feels some dyspnea after exertion with regular activities. He has not returned to exercise regimen but does feel somewhat winded after walking long stretches.  Recently put on entresto. Weight is heavier (195 to 238 in the setting of lack of exercise). No chest pain.K checked again within normal limits.  Recent hospital visit for chest pain 5/7/2023. Trop negative, EKG benign. Here for follow up.  Chest pain, SOB occurred in [place], reminiscent of prior MI. Then had N/V. Flew out of [place], through [facility]. Once landed, still ongoing. Prompted ER visit. 185/110 when there.   Patient following up after exercise stress test on 6/8/23. He has been feeling well, slowly increasing his exercise at the gym and would like to begin a new program, denies any recent occurrence of chest pain or sob. Weight is up by 5lbs to 230lbs which he states he is unsure how since he has been eating healthy and working out more. Overall he has been feeling well.   TTE this morning 8/2023 with EF 50% and improvement.  BP slightly elevated. More exercise. 
sob

## 2023-08-22 NOTE — CARDIOLOGY SUMMARY
[de-identified] : 6/8/2023\par  IMPRESSION: Abnormal SPECT Myocardial Perfusion Imaging at suboptimal workload - pt achieved 76% of target heart rate less than 85% target.\par  There is a moderate in size, severe in intensity nonreversible defect in the anteroseptal region in the apical segments likely due to myocardial infarct.  There is a small (1-2 segments) amount of periinfarct ischemia in this region.\par  Reduced left ventricular contractility with an ejection fraction of 43% (Normal: 50% or greater).\par  Regional wall motion abnormalities are present: the anteroseptal region in the apical segments are hypokinetic with reduced wall thickening.\par  The left ventricle is normal in size.\par  The transient ischemic dilation (TID) ratio was normal.\par  \par   \par  \par   Ordered by: KARMEN PARSONS       Collected/Examined: 08Jun2023 10:45AM       \par  Verification Required       Stage: Final       \par   Performed at: [facility]       Resulted: 09Jun2023 10:20AM       Last Updated: 09Jun2023 04:33PM       Accession: [id number]       \par    [de-identified] : TTE 12/2022: \par   Left ventricle systolic function appears moderately impaired; There is\par   severe hypokinesis of the mid to distal anterior and anteroseptal , and\par   apical segments. Remaining segments exhibit normal contractility.\par   Estimated left ventricular ejection fraction is 40 %.\par   The aortic valve is well visualized, appears calcified. Valve opening\par   seems to be normal.\par   Trace aortic regurgitation is present.\par   Normal appearing mitral valve structure.\par   Trace to mild mitral regurgitation is present.\par   Normal appearing tricuspid valve structure.\par   Trace tricuspid valve regurgitation is present.\par   [de-identified] : 12/18/2022 PCI\par  Dominance: Right\par  \par   Impression\par  \par   Diagnostic Conclusions\par   1. pLAD culprit 99% with RICH 2 flow\par   2. Severe atherosclerosis of OM1\par   3. EF 30% with wrap around LAD territory. LVEDP 31 at start of case.\par  \par   Interventional Conclusions\par  \par   1. Successful LYLE placement to proximal LAD with IVUS used to guide post\par   dilation and stent optimization.\par   2. Heavily calcified vessel seen diffusely on IVUS with acceptable\par   proximal and distal plaque burden.\par   3. LVEDP post revascularization 19.\par  \par   Recommendations\par  \par   1. Continue aspirin 81 mg daily and ticagrelor 90 mg bid\par   2. Recommend aggressive medical management for CAD as per ACC/AHA\par   guidelines with risk factor modification including smoking cessation.\par   3. Gentle hydration overnight\par   4. TTE tomorrow in CCU\par   5. plan for stage PCI to Cx pending clinical course\par   6. findings relayed to patient, patient's wife, and CCU team\par  \par  12/20/2022:\par  \par   Successful LYLE placement placed to OM1 after cutting balloon angioplasty\par   in the setting of moderate calcium. IVUS revealed >7 mm2 area throughout\par   stent with proximal Cx with mild to moderate disease at 5.5 mm2 in native\par   tissue.\par  \par   Recommendations\par  \par   Maintain on DAPT for 12 months given post STEMI. Recommend close\par   cardiology follow up for optimal medical management and risk factor\par   modification. Findings relayed to patient and patient's wife.\par

## 2023-08-23 DIAGNOSIS — I50.40 UNSPECIFIED COMBINED SYSTOLIC (CONGESTIVE) AND DIASTOLIC (CONGESTIVE) HEART FAILURE: ICD-10-CM

## 2023-09-12 ENCOUNTER — APPOINTMENT (OUTPATIENT)
Dept: CARDIOLOGY | Facility: CLINIC | Age: 53
End: 2023-09-12
Payer: COMMERCIAL

## 2023-09-12 VITALS
HEIGHT: 70 IN | DIASTOLIC BLOOD PRESSURE: 87 MMHG | HEART RATE: 74 BPM | OXYGEN SATURATION: 96 % | SYSTOLIC BLOOD PRESSURE: 142 MMHG | BODY MASS INDEX: 3.15 KG/M2 | RESPIRATION RATE: 16 BRPM | WEIGHT: 22 LBS

## 2023-09-12 PROCEDURE — 99215 OFFICE O/P EST HI 40 MIN: CPT | Mod: 25

## 2023-09-12 PROCEDURE — 93000 ELECTROCARDIOGRAM COMPLETE: CPT

## 2023-09-12 RX ORDER — METOPROLOL SUCCINATE 50 MG/1
50 TABLET, EXTENDED RELEASE ORAL DAILY
Refills: 0 | Status: ACTIVE | COMMUNITY

## 2023-09-12 RX ORDER — METOPROLOL SUCCINATE 50 MG/1
50 TABLET, EXTENDED RELEASE ORAL DAILY
Qty: 30 | Refills: 6 | Status: DISCONTINUED | COMMUNITY
End: 2023-09-12

## 2023-09-13 ENCOUNTER — RX RENEWAL (OUTPATIENT)
Age: 53
End: 2023-09-13

## 2023-10-17 ENCOUNTER — APPOINTMENT (OUTPATIENT)
Dept: CARDIOLOGY | Facility: CLINIC | Age: 53
End: 2023-10-17
Payer: COMMERCIAL

## 2023-10-17 VITALS
SYSTOLIC BLOOD PRESSURE: 142 MMHG | BODY MASS INDEX: 31.5 KG/M2 | OXYGEN SATURATION: 95 % | HEART RATE: 72 BPM | DIASTOLIC BLOOD PRESSURE: 88 MMHG | HEIGHT: 70 IN | WEIGHT: 220 LBS | RESPIRATION RATE: 16 BRPM

## 2023-10-17 PROCEDURE — 93000 ELECTROCARDIOGRAM COMPLETE: CPT

## 2023-10-17 PROCEDURE — 99215 OFFICE O/P EST HI 40 MIN: CPT | Mod: 25

## 2023-10-17 RX ORDER — ATORVASTATIN CALCIUM 80 MG/1
80 TABLET, FILM COATED ORAL
Qty: 90 | Refills: 3 | Status: ACTIVE | COMMUNITY
Start: 2023-01-03 | End: 1900-01-01

## 2023-12-28 ENCOUNTER — NON-APPOINTMENT (OUTPATIENT)
Age: 53
End: 2023-12-28

## 2024-01-04 ENCOUNTER — NON-APPOINTMENT (OUTPATIENT)
Age: 54
End: 2024-01-04

## 2024-01-09 ENCOUNTER — APPOINTMENT (OUTPATIENT)
Dept: CARDIOLOGY | Facility: CLINIC | Age: 54
End: 2024-01-09

## 2024-01-23 ENCOUNTER — APPOINTMENT (OUTPATIENT)
Dept: CARDIOLOGY | Facility: CLINIC | Age: 54
End: 2024-01-23
Payer: COMMERCIAL

## 2024-01-23 VITALS
HEIGHT: 70 IN | HEART RATE: 77 BPM | WEIGHT: 205 LBS | BODY MASS INDEX: 29.35 KG/M2 | SYSTOLIC BLOOD PRESSURE: 154 MMHG | DIASTOLIC BLOOD PRESSURE: 86 MMHG | OXYGEN SATURATION: 97 %

## 2024-01-23 PROCEDURE — 99215 OFFICE O/P EST HI 40 MIN: CPT | Mod: 25

## 2024-01-23 RX ORDER — SPIRONOLACTONE 25 MG/1
25 TABLET ORAL DAILY
Qty: 30 | Refills: 3 | Status: ACTIVE | COMMUNITY
Start: 2024-01-23 | End: 1900-01-01

## 2024-01-23 RX ORDER — TICAGRELOR 90 MG/1
90 TABLET ORAL
Qty: 180 | Refills: 3 | Status: DISCONTINUED | COMMUNITY
Start: 2023-02-21 | End: 2024-01-23

## 2024-01-23 NOTE — CARDIOLOGY SUMMARY
[de-identified] : 6/8/2023\par  IMPRESSION: Abnormal SPECT Myocardial Perfusion Imaging at suboptimal workload - pt achieved 76% of target heart rate less than 85% target.\par  There is a moderate in size, severe in intensity nonreversible defect in the anteroseptal region in the apical segments likely due to myocardial infarct.  There is a small (1-2 segments) amount of periinfarct ischemia in this region.\par  Reduced left ventricular contractility with an ejection fraction of 43% (Normal: 50% or greater).\par  Regional wall motion abnormalities are present: the anteroseptal region in the apical segments are hypokinetic with reduced wall thickening.\par  The left ventricle is normal in size.\par  The transient ischemic dilation (TID) ratio was normal.\par  \par   \par  \par   Ordered by: KARMEN PARSONS       Collected/Examined: 08Jun2023 10:45AM       \par  Verification Required       Stage: Final       \par   Performed at: [facility]       Resulted: 09Jun2023 10:20AM       Last Updated: 09Jun2023 04:33PM       Accession: [id number]       \par    [de-identified] : TTE 12/2022:   Left ventricle systolic function appears moderately impaired; There is  severe hypokinesis of the mid to distal anterior and anteroseptal , and  apical segments. Remaining segments exhibit normal contractility.  Estimated left ventricular ejection fraction is 40 %.  The aortic valve is well visualized, appears calcified. Valve opening  seems to be normal.  Trace aortic regurgitation is present.  Normal appearing mitral valve structure.  Trace to mild mitral regurgitation is present.  Normal appearing tricuspid valve structure.  Trace tricuspid valve regurgitation is present.  [de-identified] : 12/18/2022 PCI Dominance: Right   Impression   Diagnostic Conclusions  1. pLAD culprit 99% with RICH 2 flow  2. Severe atherosclerosis of OM1  3. EF 30% with wrap around LAD territory. LVEDP 31 at start of case.   Interventional Conclusions   1. Successful LYLE placement to proximal LAD with IVUS used to guide post  dilation and stent optimization.  2. Heavily calcified vessel seen diffusely on IVUS with acceptable  proximal and distal plaque burden.  3. LVEDP post revascularization 19.   Recommendations   1. Continue aspirin 81 mg daily and ticagrelor 90 mg bid  2. Recommend aggressive medical management for CAD as per ACC/AHA  guidelines with risk factor modification including smoking cessation.  3. Gentle hydration overnight  4. TTE tomorrow in CCU  5. plan for stage PCI to Cx pending clinical course  6. findings relayed to patient, patient's wife, and CCU team  12/20/2022:   Successful LYLE placement placed to OM1 after cutting balloon angioplasty  in the setting of moderate calcium. IVUS revealed >7 mm2 area throughout  stent with proximal Cx with mild to moderate disease at 5.5 mm2 in native  tissue.   Recommendations   Maintain on DAPT for 12 months given post STEMI. Recommend close  cardiology follow up for optimal medical management and risk factor  modification. Findings relayed to patient and patient's wife.

## 2024-01-23 NOTE — HISTORY OF PRESENT ILLNESS
[FreeTextEntry1] : Luis Perry is a 52 year old male PMHx HTN, HLD, CVA/TIA, recent AWSTEMI who presents after recent hospitalization. 12/18 with anterior wall STEMI s/p 1 LYLE in setting of intermittent symptoms for a week or so. He underwent stage to his Cx. EF 40% with distal anterior wall hypokinesis. Reports no chest pain, sob at this time and he is back to work. He does state that he feels some dyspnea after exertion with regular activities. He has not returned to exercise regimen but does feel somewhat winded after walking long stretches.  Recently put on entresto. Weight is heavier (195 to 238 in the setting of lack of exercise). No chest pain.K checked again within normal limits.  Recent hospital visit for chest pain 5/7/2023. Trop negative, EKG benign. Here for follow up.  Chest pain, SOB occurred in [place], reminiscent of prior MI. Then had N/V. Flew out of [place], through [facility]. Once landed, still ongoing. Prompted ER visit. 185/110 when there.   Patient following up after exercise stress test on 6/8/23. He has been feeling well, slowly increasing his exercise at the gym and would like to begin a new program, denies any recent occurrence of chest pain or sob. Weight is up by 5lbs to 230lbs which he states he is unsure how since he has been eating healthy and working out more. Overall he has been feeling well. Now down to 205.   TTE this morning 8/2023 with EF 50% and improvement.  BP slightly elevated. More exercise.  Slightly more bleeding.

## 2024-01-23 NOTE — DISCUSSION/SUMMARY
[FreeTextEntry1] : 52 year old male PMHx HTN, HLD, CVA/TIA, recent AWSTEMI, HFrEF (40-45%), ACC/AHA Stage C. Euvolemic on exam on post STEMI CAD medications along with GDMT. LDL at goa. Weight reduced.  -c/w aspirin 81 mg and ticagrelor 90 mg bid. Now more than 1 year out, plan to stop ticagrelor.  -c/w entresto   -c/w carvedilol 12.5 mg bid, increase to 18.5 BID  -c/w farixga 10 mg daily -continue statin 80 mg  -plan to start spironolactone in setting of HF as well as resistant HTN, 25 mg daily  -reviewed results of stress test with patient, it is ok to slowly increase exercise regimen as tolerated especially given improved EF on TTE -encouraged pt to continue with weight loss goals, discussed measuring waist circumference as well as keeping track of weight.   -patient to follow up in 3-6 month  Differential diagnosis and plan of care discussed with patient after the evaluation.  Counseling on diet, exercise, and medication compliance was done. Counseling on smoking and alcohol cessation was offered when appropriate. Pain assessed and judicious use of narcotics when appropriate was discussed. Importance of fall prevention discussed. Advanced care planning was discussed with patient and family.  [EKG obtained to assist in diagnosis and management of assessed problem(s)] : EKG obtained to assist in diagnosis and management of assessed problem(s)

## 2024-01-31 RX ORDER — SACUBITRIL AND VALSARTAN 97; 103 MG/1; MG/1
97-103 TABLET, FILM COATED ORAL TWICE DAILY
Qty: 180 | Refills: 2 | Status: ACTIVE | COMMUNITY
Start: 2023-01-17 | End: 1900-01-01

## 2024-05-20 RX ORDER — CARVEDILOL 25 MG/1
25 TABLET, FILM COATED ORAL TWICE DAILY
Qty: 180 | Refills: 3 | Status: ACTIVE | COMMUNITY
Start: 2023-09-12 | End: 1900-01-01

## 2024-07-10 ENCOUNTER — RX RENEWAL (OUTPATIENT)
Age: 54
End: 2024-07-10

## 2024-07-23 ENCOUNTER — APPOINTMENT (OUTPATIENT)
Dept: CARDIOLOGY | Facility: CLINIC | Age: 54
End: 2024-07-23

## 2024-08-21 ENCOUNTER — APPOINTMENT (OUTPATIENT)
Dept: CARDIOLOGY | Facility: CLINIC | Age: 54
End: 2024-08-21

## 2024-09-06 NOTE — ED ADULT NURSE NOTE - CINV DISCH MEDS REVIEWED YN
SW/DEANN Discharge Plan  Informed patient is ready for discharge.  Patient to be picked up by hospital transport. Patient/interested person has been counseled for post hospitalization care.  Patient unable to agree with goals & plan, Family/S.O./Caregiver agrees. Initial implementation of the patient’s discharge plan has been arranged, including any devices/equipment needed for discharge. Discharge plan communicated to MD, RN, DEANN, Receiving Facility/Agency, and pt, son, dtr .    Patient’s discharge destination is Rehabilitation[facility]Skilled [facility] ([facility]).    Selected Continued Care - Admitted Since 8/30/2024       Durable Medical Equipment       Service Provider Selected Services Address Phone Fax    [facility] [address] [place] [address] [address] [phone] --    [facility] Durable Medical Equipment [address], [facility] [address] [phone]              Home Medical Care       Service Provider Selected Services Address Phone Fax    [facility] -  Home Health Services [address] [place] [address] [address] -- --              Destination  Coordination complete.      Service Provider Selected Services Address Phone Fax    [facility] -  Inpatient Rehabilitation [address] [address] [phone]                    Plan for d/c today to [facility] acute inpatient rehab.     When pt completes AIR, then plan is for return home w/son & dtr, w/resumption [facility] (updates and resumption orders sent via [facility] 9/6), resumption home O2 thru [facility], new AVAPs thru [facility] (approved, will need delivery and set up upon d/c from [facility]).   Yes

## 2025-01-26 ENCOUNTER — EMERGENCY (EMERGENCY)
Facility: HOSPITAL | Age: 55
LOS: 0 days | Discharge: HOME | End: 2025-01-26
Attending: STUDENT IN AN ORGANIZED HEALTH CARE EDUCATION/TRAINING PROGRAM
Payer: COMMERCIAL

## 2025-01-26 VITALS
TEMPERATURE: 101 F | DIASTOLIC BLOOD PRESSURE: 78 MMHG | HEART RATE: 70 BPM | SYSTOLIC BLOOD PRESSURE: 145 MMHG | OXYGEN SATURATION: 98 % | RESPIRATION RATE: 17 BRPM

## 2025-01-26 VITALS — WEIGHT: 226.64 LBS

## 2025-01-26 DIAGNOSIS — Z90.89 ACQUIRED ABSENCE OF OTHER ORGANS: Chronic | ICD-10-CM

## 2025-01-26 DIAGNOSIS — Z90.49 ACQUIRED ABSENCE OF OTHER SPECIFIED PARTS OF DIGESTIVE TRACT: Chronic | ICD-10-CM

## 2025-01-26 DIAGNOSIS — R06.02 SHORTNESS OF BREATH: ICD-10-CM

## 2025-01-26 DIAGNOSIS — R07.89 OTHER CHEST PAIN: ICD-10-CM

## 2025-01-26 DIAGNOSIS — I25.2 OLD MYOCARDIAL INFARCTION: ICD-10-CM

## 2025-01-26 DIAGNOSIS — Z79.82 LONG TERM (CURRENT) USE OF ASPIRIN: ICD-10-CM

## 2025-01-26 DIAGNOSIS — Z86.73 PERSONAL HISTORY OF TRANSIENT ISCHEMIC ATTACK (TIA), AND CEREBRAL INFARCTION WITHOUT RESIDUAL DEFICITS: ICD-10-CM

## 2025-01-26 DIAGNOSIS — Z88.0 ALLERGY STATUS TO PENICILLIN: ICD-10-CM

## 2025-01-26 DIAGNOSIS — Z98.890 OTHER SPECIFIED POSTPROCEDURAL STATES: Chronic | ICD-10-CM

## 2025-01-26 DIAGNOSIS — E78.5 HYPERLIPIDEMIA, UNSPECIFIED: ICD-10-CM

## 2025-01-26 DIAGNOSIS — I11.0 HYPERTENSIVE HEART DISEASE WITH HEART FAILURE: ICD-10-CM

## 2025-01-26 DIAGNOSIS — I50.9 HEART FAILURE, UNSPECIFIED: ICD-10-CM

## 2025-01-26 LAB
ALBUMIN SERPL ELPH-MCNC: 4.1 G/DL — SIGNIFICANT CHANGE UP (ref 3.3–5)
ALP SERPL-CCNC: 72 U/L — SIGNIFICANT CHANGE UP (ref 40–120)
ALT FLD-CCNC: 26 U/L — SIGNIFICANT CHANGE UP (ref 12–78)
ANION GAP SERPL CALC-SCNC: 3 MMOL/L — LOW (ref 5–17)
APTT BLD: 34.3 SEC — SIGNIFICANT CHANGE UP (ref 24.5–35.6)
AST SERPL-CCNC: 23 U/L — SIGNIFICANT CHANGE UP (ref 15–37)
BASOPHILS # BLD AUTO: 0.03 K/UL — SIGNIFICANT CHANGE UP (ref 0–0.2)
BASOPHILS NFR BLD AUTO: 0.5 % — SIGNIFICANT CHANGE UP (ref 0–2)
BILIRUB SERPL-MCNC: 0.3 MG/DL — SIGNIFICANT CHANGE UP (ref 0.2–1.2)
BUN SERPL-MCNC: 19 MG/DL — SIGNIFICANT CHANGE UP (ref 7–23)
CALCIUM SERPL-MCNC: 9.1 MG/DL — SIGNIFICANT CHANGE UP (ref 8.5–10.1)
CHLORIDE SERPL-SCNC: 105 MMOL/L — SIGNIFICANT CHANGE UP (ref 96–108)
CO2 SERPL-SCNC: 27 MMOL/L — SIGNIFICANT CHANGE UP (ref 22–31)
CREAT SERPL-MCNC: 0.95 MG/DL — SIGNIFICANT CHANGE UP (ref 0.5–1.3)
EGFR: 94 ML/MIN/1.73M2 — SIGNIFICANT CHANGE UP
EOSINOPHIL # BLD AUTO: 0.23 K/UL — SIGNIFICANT CHANGE UP (ref 0–0.5)
EOSINOPHIL NFR BLD AUTO: 3.5 % — SIGNIFICANT CHANGE UP (ref 0–6)
GLUCOSE SERPL-MCNC: 102 MG/DL — HIGH (ref 70–99)
HCT VFR BLD CALC: 48.6 % — SIGNIFICANT CHANGE UP (ref 39–50)
HGB BLD-MCNC: 16.4 G/DL — SIGNIFICANT CHANGE UP (ref 13–17)
IMM GRANULOCYTES NFR BLD AUTO: 0.3 % — SIGNIFICANT CHANGE UP (ref 0–0.9)
INR BLD: 1.02 RATIO — SIGNIFICANT CHANGE UP (ref 0.85–1.16)
LYMPHOCYTES # BLD AUTO: 1.55 K/UL — SIGNIFICANT CHANGE UP (ref 1–3.3)
LYMPHOCYTES # BLD AUTO: 23.4 % — SIGNIFICANT CHANGE UP (ref 13–44)
MCHC RBC-ENTMCNC: 29.6 PG — SIGNIFICANT CHANGE UP (ref 27–34)
MCHC RBC-ENTMCNC: 33.7 G/DL — SIGNIFICANT CHANGE UP (ref 32–36)
MCV RBC AUTO: 87.7 FL — SIGNIFICANT CHANGE UP (ref 80–100)
MONOCYTES # BLD AUTO: 1 K/UL — HIGH (ref 0–0.9)
MONOCYTES NFR BLD AUTO: 15.1 % — HIGH (ref 2–14)
NEUTROPHILS # BLD AUTO: 3.79 K/UL — SIGNIFICANT CHANGE UP (ref 1.8–7.4)
NEUTROPHILS NFR BLD AUTO: 57.2 % — SIGNIFICANT CHANGE UP (ref 43–77)
NT-PROBNP SERPL-SCNC: 57 PG/ML — SIGNIFICANT CHANGE UP (ref 0–125)
PLATELET # BLD AUTO: 215 K/UL — SIGNIFICANT CHANGE UP (ref 150–400)
POTASSIUM SERPL-MCNC: 4.4 MMOL/L — SIGNIFICANT CHANGE UP (ref 3.5–5.3)
POTASSIUM SERPL-SCNC: 4.4 MMOL/L — SIGNIFICANT CHANGE UP (ref 3.5–5.3)
PROT SERPL-MCNC: 7.3 GM/DL — SIGNIFICANT CHANGE UP (ref 6–8.3)
PROTHROM AB SERPL-ACNC: 11.7 SEC — SIGNIFICANT CHANGE UP (ref 9.9–13.4)
RBC # BLD: 5.54 M/UL — SIGNIFICANT CHANGE UP (ref 4.2–5.8)
RBC # FLD: 13.1 % — SIGNIFICANT CHANGE UP (ref 10.3–14.5)
SODIUM SERPL-SCNC: 135 MMOL/L — SIGNIFICANT CHANGE UP (ref 135–145)
TROPONIN I, HIGH SENSITIVITY RESULT: 15.58 NG/L — SIGNIFICANT CHANGE UP
TROPONIN I, HIGH SENSITIVITY RESULT: 16.01 NG/L — SIGNIFICANT CHANGE UP
WBC # BLD: 6.62 K/UL — SIGNIFICANT CHANGE UP (ref 3.8–10.5)
WBC # FLD AUTO: 6.62 K/UL — SIGNIFICANT CHANGE UP (ref 3.8–10.5)

## 2025-01-26 PROCEDURE — 71045 X-RAY EXAM CHEST 1 VIEW: CPT | Mod: 26

## 2025-01-26 PROCEDURE — 99285 EMERGENCY DEPT VISIT HI MDM: CPT | Mod: 25

## 2025-01-26 PROCEDURE — 71045 X-RAY EXAM CHEST 1 VIEW: CPT

## 2025-01-26 PROCEDURE — 85730 THROMBOPLASTIN TIME PARTIAL: CPT

## 2025-01-26 PROCEDURE — 85610 PROTHROMBIN TIME: CPT

## 2025-01-26 PROCEDURE — 99285 EMERGENCY DEPT VISIT HI MDM: CPT

## 2025-01-26 PROCEDURE — 83880 ASSAY OF NATRIURETIC PEPTIDE: CPT

## 2025-01-26 PROCEDURE — 36415 COLL VENOUS BLD VENIPUNCTURE: CPT

## 2025-01-26 PROCEDURE — 36000 PLACE NEEDLE IN VEIN: CPT

## 2025-01-26 PROCEDURE — 80053 COMPREHEN METABOLIC PANEL: CPT

## 2025-01-26 PROCEDURE — 84484 ASSAY OF TROPONIN QUANT: CPT

## 2025-01-26 PROCEDURE — 85025 COMPLETE CBC W/AUTO DIFF WBC: CPT

## 2025-01-26 PROCEDURE — 93005 ELECTROCARDIOGRAM TRACING: CPT

## 2025-01-26 PROCEDURE — 93010 ELECTROCARDIOGRAM REPORT: CPT

## 2025-01-26 RX ORDER — ASPIRIN 81 MG
324 TABLET, DELAYED RELEASE (ENTERIC COATED) ORAL ONCE
Refills: 0 | Status: COMPLETED | OUTPATIENT
Start: 2025-01-26 | End: 2025-01-26

## 2025-01-26 RX ADMIN — Medication 324 MILLIGRAM(S): at 15:39

## 2025-01-26 NOTE — ED PROVIDER NOTE - CLINICAL SUMMARY MEDICAL DECISION MAKING FREE TEXT BOX
55 y/o male with PMHx of HTN on Carvedilol, HLD on atorvastatin, CVA, TIA, MI 2 years ago s/p 2 stents, CHF on Entresto and daily ASA presents to the ED with wife c/o left-sided chest pain radiating down LUE and SOB onset around 1PM today while food shopping. Patient well-appearing, hemodynamically stable, pain is resolved on arrival to the ER.  Patient took aspirin prior to arrival.  Differential includes not limited to ACS, doubt PE or a pathology.  Plan for labs, EKG, chest x-ray, telemetry.  Reassess

## 2025-01-26 NOTE — ED PROVIDER NOTE - PROGRESS NOTE DETAILS
Labs nonactionable, troponin x 2 are negative, EKG is normal.  Patient is  higher risk for ACS but is asymptomatic.  Had shared decision-making with patient and patient's wife, patient elects to be discharged to follow-up rapidly with his cardiologist Dr. Reed instead of being admitted for further ischemic workup.  Return precautions discussed with patient and patient's wife who verbalized understanding.  Will discharge patient to follow-up with cardiology.

## 2025-01-26 NOTE — ED PROVIDER NOTE - CARE PROVIDER_API CALL
Surya Reed  Interventional Cardiology  [address], [address]  [place] [address]  Phone: [phone]  Fax: [phone]  Follow Up Time: 4-6 Days

## 2025-01-26 NOTE — ED PROVIDER NOTE - OBJECTIVE STATEMENT
53 y/o male with PMHx of HTN on Carvedilol, HLD on atorvastatin, CVA, TIA, MI 2 years ago s/p 2 stents, CHF on Entresto and daily ASA presents to the ED with wife c/o left-sided chest pain radiating down LUE and SOB onset around 1PM today while food shopping. Denies nausea, diaphoresis. Pain resolved minutes after ED arrival. Patient states that after onset, the pain progressed to a point where it felt similar to when he had an MI 2 years ago. Today was his first episode of chest pain since his MI. He is on daily ASA and has been taking his medications regularly but had not yet taken them today.  Cardiologist: Dr. Reed

## 2025-01-26 NOTE — ED STATDOCS - PROGRESS NOTE DETAILS
Andrei Álvarez for attending Dr. Perez  54 year old male with PMHx of HTN, HLD, CVA, TIA, MI; presents to ED c/o L sided chest pain radiating down LUE onset x 1 hour PTA. Patient states this feels similar to when he had a MI x 2 year ago. Patient takes daily ASA. EKG NSR however given cardiac history, sending to the MAIN for further evaluation.

## 2025-01-26 NOTE — ED PROVIDER NOTE - ST/T WAVE
Your home care referral was sent to [facility]   If you haven't heard from them within the next 24-48 hours,  Please call them at [phone]       No STEMI, normal intervals.

## 2025-01-26 NOTE — ED PROVIDER NOTE - NSFOLLOWUPINSTRUCTIONS_ED_ALL_ED_FT
Please follow-up with your cardiologist Dr. Reed within next week.    Chest Pain    Chest pain can be caused by many different conditions which may or may not be dangerous. Causes include heartburn, lung infections, heart attack, blood clot in lungs, skin infections, strain or damage to muscle, cartilage, or bones, etc. In addition to a history and physical examination, an electrocardiogram (ECG) or other lab tests may have been performed to determine the cause of your chest pain. Follow up with your primary care provider or with a cardiologist as instructed.       SEEK IMMEDIATE MEDICAL CARE IF YOU HAVE ANY OF THE FOLLOWING SYMPTOMS: worsening chest pain, coughing up blood, unexplained back/neck/jaw pain, severe abdominal pain, dizziness or lightheadedness, fainting, shortness of breath, sweaty or clammy skin, vomiting, or racing heart beat. These symptoms may represent a serious problem that is an emergency. Do not wait to see if the symptoms will go away. Get medical help right away. Call 911 and do not drive yourself to the hospital.

## 2025-01-26 NOTE — ED PROVIDER NOTE - PATIENT PORTAL LINK FT
You can access the FollowMyHealth Patient Portal offered by [facility] by registering at the following website: [email or web address]. By joining [employer]’s FollowMyHealth portal, you will also be able to view your health information using other applications (apps) compatible with our system.

## 2025-01-26 NOTE — ED ADULT TRIAGE NOTE - CHIEF COMPLAINT QUOTE
PT presents to er with complaints of left sided chest pain radiating down his lue, onset one hr prior to arrival, history of MI 2 yrs ago and states this feels the same, on daily asa, denies taking asa or his daily meds prior to arrival. Pt sent for stat EKG.

## 2025-01-26 NOTE — ED ADULT NURSE NOTE - OBJECTIVE STATEMENT
pt presenting w/chest pains radiating down left arm. HX MI, states he didn't take his meds today. well appearing, IV abd labs sent EKG complete. pt given full dose ASA

## 2025-02-05 ENCOUNTER — APPOINTMENT (OUTPATIENT)
Dept: CARDIOLOGY | Facility: CLINIC | Age: 55
End: 2025-02-05
Payer: COMMERCIAL

## 2025-02-05 ENCOUNTER — NON-APPOINTMENT (OUTPATIENT)
Age: 55
End: 2025-02-05

## 2025-02-05 VITALS
DIASTOLIC BLOOD PRESSURE: 84 MMHG | OXYGEN SATURATION: 96 % | WEIGHT: 218 LBS | HEIGHT: 70 IN | BODY MASS INDEX: 31.21 KG/M2 | SYSTOLIC BLOOD PRESSURE: 140 MMHG | HEART RATE: 80 BPM

## 2025-02-05 PROCEDURE — G2211 COMPLEX E/M VISIT ADD ON: CPT | Mod: NC

## 2025-02-05 PROCEDURE — 99215 OFFICE O/P EST HI 40 MIN: CPT

## 2025-02-05 PROCEDURE — 93000 ELECTROCARDIOGRAM COMPLETE: CPT

## 2025-03-18 ENCOUNTER — APPOINTMENT (OUTPATIENT)
Facility: CLINIC | Age: 55
End: 2025-03-18

## 2025-03-18 VITALS — HEIGHT: 70 IN | WEIGHT: 200 LBS | BODY MASS INDEX: 28.63 KG/M2

## 2025-03-18 DIAGNOSIS — M75.102 UNSPECIFIED ROTATOR CUFF TEAR OR RUPTURE OF LEFT SHOULDER, NOT SPECIFIED AS TRAUMATIC: ICD-10-CM

## 2025-03-18 PROCEDURE — 73010 X-RAY EXAM OF SHOULDER BLADE: CPT | Mod: LT

## 2025-03-18 PROCEDURE — 73030 X-RAY EXAM OF SHOULDER: CPT | Mod: LT

## 2025-03-18 PROCEDURE — 99204 OFFICE O/P NEW MOD 45 MIN: CPT | Mod: 25

## 2025-03-18 RX ORDER — MELOXICAM 15 MG/1
15 TABLET ORAL
Qty: 30 | Refills: 1 | Status: ACTIVE | COMMUNITY
Start: 2025-03-18 | End: 1900-01-01

## 2025-03-18 RX ORDER — ALPRAZOLAM 1 MG/1
1 TABLET ORAL
Qty: 2 | Refills: 0 | Status: ACTIVE | COMMUNITY
Start: 2025-03-18 | End: 1900-01-01

## 2025-03-28 ENCOUNTER — RESULT REVIEW (OUTPATIENT)
Age: 55
End: 2025-03-28

## 2025-03-31 ENCOUNTER — NON-APPOINTMENT (OUTPATIENT)
Age: 55
End: 2025-03-31

## 2025-04-01 ENCOUNTER — NON-APPOINTMENT (OUTPATIENT)
Age: 55
End: 2025-04-01

## 2025-04-03 ENCOUNTER — APPOINTMENT (OUTPATIENT)
Facility: CLINIC | Age: 55
End: 2025-04-03

## 2025-04-03 VITALS — HEIGHT: 70 IN | BODY MASS INDEX: 27.2 KG/M2 | WEIGHT: 190 LBS

## 2025-04-03 DIAGNOSIS — M25.812 OTHER SPECIFIED JOINT DISORDERS, LEFT SHOULDER: ICD-10-CM

## 2025-04-03 PROCEDURE — 20610 DRAIN/INJ JOINT/BURSA W/O US: CPT | Mod: LT

## 2025-04-25 ENCOUNTER — RESULT REVIEW (OUTPATIENT)
Age: 55
End: 2025-04-25

## 2025-04-25 ENCOUNTER — OUTPATIENT (OUTPATIENT)
Dept: OUTPATIENT SERVICES | Facility: HOSPITAL | Age: 55
LOS: 1 days | End: 2025-04-25
Payer: COMMERCIAL

## 2025-04-25 DIAGNOSIS — Z90.89 ACQUIRED ABSENCE OF OTHER ORGANS: Chronic | ICD-10-CM

## 2025-04-25 DIAGNOSIS — I25.10 ATHEROSCLEROTIC HEART DISEASE OF NATIVE CORONARY ARTERY WITHOUT ANGINA PECTORIS: ICD-10-CM

## 2025-04-25 DIAGNOSIS — Z90.49 ACQUIRED ABSENCE OF OTHER SPECIFIED PARTS OF DIGESTIVE TRACT: Chronic | ICD-10-CM

## 2025-04-25 DIAGNOSIS — Z98.890 OTHER SPECIFIED POSTPROCEDURAL STATES: Chronic | ICD-10-CM

## 2025-04-25 PROCEDURE — 93306 TTE W/DOPPLER COMPLETE: CPT | Mod: 26

## 2025-04-25 PROCEDURE — 93306 TTE W/DOPPLER COMPLETE: CPT

## 2025-04-26 DIAGNOSIS — I25.10 ATHEROSCLEROTIC HEART DISEASE OF NATIVE CORONARY ARTERY WITHOUT ANGINA PECTORIS: ICD-10-CM

## 2025-05-22 ENCOUNTER — NON-APPOINTMENT (OUTPATIENT)
Age: 55
End: 2025-05-22

## 2025-07-01 NOTE — PATIENT PROFILE ADULT - MEDICATIONS/VISITS
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
no

## 2025-08-06 ENCOUNTER — APPOINTMENT (OUTPATIENT)
Dept: CARDIOLOGY | Facility: CLINIC | Age: 55
End: 2025-08-06
Payer: COMMERCIAL

## 2025-08-06 VITALS
DIASTOLIC BLOOD PRESSURE: 86 MMHG | HEIGHT: 70 IN | WEIGHT: 229 LBS | HEART RATE: 75 BPM | OXYGEN SATURATION: 96 % | BODY MASS INDEX: 32.78 KG/M2 | SYSTOLIC BLOOD PRESSURE: 126 MMHG

## 2025-08-06 PROCEDURE — 99215 OFFICE O/P EST HI 40 MIN: CPT

## 2025-08-06 PROCEDURE — 93000 ELECTROCARDIOGRAM COMPLETE: CPT

## 2025-08-06 PROCEDURE — G2211 COMPLEX E/M VISIT ADD ON: CPT | Mod: NC
